# Patient Record
Sex: FEMALE | Race: WHITE | NOT HISPANIC OR LATINO | Employment: STUDENT | URBAN - METROPOLITAN AREA
[De-identification: names, ages, dates, MRNs, and addresses within clinical notes are randomized per-mention and may not be internally consistent; named-entity substitution may affect disease eponyms.]

---
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
## 2018-01-08 ENCOUNTER — PATIENT MESSAGE (OUTPATIENT)
Dept: OPHTHALMOLOGY | Facility: CLINIC | Age: 18
End: 2018-01-08

## 2018-01-10 DIAGNOSIS — G40.209 LOCALIZATION-RELATED FOCAL EPILEPSY WITH COMPLEX PARTIAL SEIZURES: ICD-10-CM

## 2018-01-10 RX ORDER — LEVETIRACETAM 750 MG/1
TABLET ORAL
Qty: 60 TABLET | Refills: 0 | Status: SHIPPED | OUTPATIENT
Start: 2018-01-10 | End: 2018-01-12 | Stop reason: SDUPTHER

## 2018-01-12 ENCOUNTER — OFFICE VISIT (OUTPATIENT)
Dept: PEDIATRIC NEUROLOGY | Facility: CLINIC | Age: 18
End: 2018-01-12
Payer: MEDICAID

## 2018-01-12 VITALS
WEIGHT: 131.38 LBS | DIASTOLIC BLOOD PRESSURE: 59 MMHG | HEART RATE: 105 BPM | HEIGHT: 57 IN | BODY MASS INDEX: 28.34 KG/M2 | SYSTOLIC BLOOD PRESSURE: 110 MMHG

## 2018-01-12 DIAGNOSIS — G40.209 LOCALIZATION-RELATED FOCAL EPILEPSY WITH COMPLEX PARTIAL SEIZURES: Primary | ICD-10-CM

## 2018-01-12 PROCEDURE — 99999 PR PBB SHADOW E&M-EST. PATIENT-LVL III: CPT | Mod: PBBFAC,,, | Performed by: PSYCHIATRY & NEUROLOGY

## 2018-01-12 PROCEDURE — 99213 OFFICE O/P EST LOW 20 MIN: CPT | Mod: S$PBB,,, | Performed by: PSYCHIATRY & NEUROLOGY

## 2018-01-12 PROCEDURE — 99213 OFFICE O/P EST LOW 20 MIN: CPT | Mod: PBBFAC | Performed by: PSYCHIATRY & NEUROLOGY

## 2018-01-12 RX ORDER — LEVETIRACETAM 750 MG/1
750 TABLET ORAL 2 TIMES DAILY
Qty: 60 TABLET | Refills: 6 | Status: SHIPPED | OUTPATIENT
Start: 2018-01-12 | End: 2018-08-16 | Stop reason: SDUPTHER

## 2018-01-12 NOTE — PROGRESS NOTES
Subjective:      Patient ID: Yudy Jauregui is a 17 y.o. female.    HPI   17 yo female with epilepsy and ID. I last saw her 6/29/17. Mom is present and provided much of the history.  Was seizure free for 2 years. Recently, ran out of medication and had breakthrough seizure.  She is on keppra 750 mg BID. No side effects.    Current Outpatient Prescriptions   Medication Sig    amitriptyline (ELAVIL) 75 MG tablet TK 1 T PO QHS    clonidine (CATAPRES) 0.1 MG tablet Take 0.1 mg by mouth 2 (two) times daily.    diazepam 5-7.5-10 mg (DIASTAT ACUDIAL) 5-7.5-10 mg Kit Place 1 each (10 mg total) rectally as needed (seizure > 5 mins).    levETIRAcetam (KEPPRA) 750 MG Tab TAKE 1 TABLET BY MOUTH TWICE DAILY    levonorgestrel-ethinyl estradiol (SEASONALE) 0.15 mg-30 mcg per tablet Take 1 tablet by mouth once daily.    VYVANSE 50 mg capsule Take 50 mg by mouth once daily.     No current facility-administered medications for this visit.          The following portions of the patient's history were reviewed and updated as appropriate: allergies, current medications, past family history, past medical history, past social history, past surgical history and problem list.    Review of Systems   Constitutional: Negative for activity change, appetite change, chills, fatigue and unexpected weight change.   HENT: Negative.    Eyes:        Sees dr Harding   Respiratory: Negative.    Cardiovascular: Negative.    Gastrointestinal: Negative.    Endocrine: Negative.    Allergic/Immunologic: Negative.    Neurological: Positive for seizures. Negative for facial asymmetry and headaches.       Objective:   Neurologic Exam     Cranial Nerves     CN III, IV, VI   Pupils are equal, round, and reactive to light.  Extraocular motions are normal.     Motor Exam     Strength   Strength 5/5 throughout.     Gait, Coordination, and Reflexes     Reflexes   Right biceps: 1+  Left biceps: 1+  Right patellar: 1+  Left patellar: 1+  Right achilles: 1+  Left  achilles: 1+      Physical Exam   Constitutional: She appears well-developed and well-nourished. No distress.   Eyes: EOM are normal. Pupils are equal, round, and reactive to light.   Cardiovascular: Normal rate.    Pulmonary/Chest: Effort normal.   Neurological: She is alert. She has normal strength. No cranial nerve deficit or sensory deficit. She exhibits abnormal muscle tone. She displays a negative Romberg sign. Coordination abnormal. Gait normal.   Reflex Scores:       Bicep reflexes are 1+ on the right side and 1+ on the left side.       Patellar reflexes are 1+ on the right side and 1+ on the left side.       Achilles reflexes are 1+ on the right side and 1+ on the left side.      Assessment:     15 yo female with epilepsy and ID    Plan:     Continue keppra. SEs reviewed.  Seizure precautions and seizure first aid were discussed with the patient and family.  Discussed plan for transition of care  Follow up in 6 months  Family was instructed to contact either the primary care physician office or our office by telephone if there is any deterioration in his neurologic status, change in presenting symptoms, lack of beneficial response to treatment plan, or signs of adverse effects of current therapies, all of which were reviewed.   Letter sent to PCP

## 2018-02-07 DIAGNOSIS — G40.209 LOCALIZATION-RELATED FOCAL EPILEPSY WITH COMPLEX PARTIAL SEIZURES: ICD-10-CM

## 2018-02-07 RX ORDER — LEVETIRACETAM 750 MG/1
TABLET ORAL
Qty: 60 TABLET | Refills: 0 | OUTPATIENT
Start: 2018-02-07

## 2018-02-08 ENCOUNTER — OFFICE VISIT (OUTPATIENT)
Dept: OPHTHALMOLOGY | Facility: CLINIC | Age: 18
End: 2018-02-08
Payer: MEDICAID

## 2018-02-08 DIAGNOSIS — H52.13 SEVERE MYOPIA OF BOTH EYES: ICD-10-CM

## 2018-02-08 DIAGNOSIS — H50.52 EXOPHORIA: Primary | ICD-10-CM

## 2018-02-08 PROCEDURE — 92012 INTRM OPH EXAM EST PATIENT: CPT | Mod: ,,, | Performed by: OPHTHALMOLOGY

## 2018-02-08 PROCEDURE — 92060 SENSORIMOTOR EXAMINATION: CPT | Mod: ,,, | Performed by: OPHTHALMOLOGY

## 2018-02-08 NOTE — PROGRESS NOTES
HPI     DLS 04/06/17   16 Y/O F here today with her Care-Giver (Dayana) for her   annual exotropia ck. Pt reports vision is good and has been doing well at   school. stj    POHx:   1. Exotropia     S/P Procedure: recession of RLR of 6mm Myectomy of IO of OU  (05/18/2005)    Last edited by Kimberly Ramirez MA on 2/8/2018  9:14 AM. (History)            Assessment /Plan     For exam results, see Encounter Report.    Exophoria    Severe myopia of both eyes      Update specs  RTC PRN, optom

## 2018-02-12 ENCOUNTER — PATIENT MESSAGE (OUTPATIENT)
Dept: OPHTHALMOLOGY | Facility: CLINIC | Age: 18
End: 2018-02-12

## 2018-04-05 ENCOUNTER — OFFICE VISIT (OUTPATIENT)
Dept: OBSTETRICS AND GYNECOLOGY | Facility: CLINIC | Age: 18
End: 2018-04-05
Payer: MEDICAID

## 2018-04-05 VITALS
DIASTOLIC BLOOD PRESSURE: 80 MMHG | WEIGHT: 132.19 LBS | HEIGHT: 57 IN | SYSTOLIC BLOOD PRESSURE: 110 MMHG | BODY MASS INDEX: 28.52 KG/M2

## 2018-04-05 DIAGNOSIS — Z30.8 ENCOUNTER FOR OTHER CONTRACEPTIVE MANAGEMENT: ICD-10-CM

## 2018-04-05 DIAGNOSIS — Z30.9 ENCOUNTER FOR CONTRACEPTIVE MANAGEMENT, UNSPECIFIED TYPE: ICD-10-CM

## 2018-04-05 DIAGNOSIS — Z01.419 ENCOUNTER FOR GYNECOLOGICAL EXAMINATION WITHOUT ABNORMAL FINDING: Primary | ICD-10-CM

## 2018-04-05 PROCEDURE — 99395 PREV VISIT EST AGE 18-39: CPT | Mod: S$PBB,,, | Performed by: OBSTETRICS & GYNECOLOGY

## 2018-04-05 PROCEDURE — 99213 OFFICE O/P EST LOW 20 MIN: CPT | Mod: PBBFAC,PN | Performed by: OBSTETRICS & GYNECOLOGY

## 2018-04-05 PROCEDURE — 99999 PR PBB SHADOW E&M-EST. PATIENT-LVL III: CPT | Mod: PBBFAC,,, | Performed by: OBSTETRICS & GYNECOLOGY

## 2018-04-05 RX ORDER — LEVONORGESTREL AND ETHINYL ESTRADIOL 0.15-0.03
1 KIT ORAL DAILY
Qty: 84 TABLET | Refills: 4 | Status: SHIPPED | OUTPATIENT
Start: 2018-04-05 | End: 2019-04-05

## 2018-05-10 NOTE — PROGRESS NOTES
"CC: Well woman exam    Yudy Jauregui is a 17 y.o. female  presents for [employer]. Patient is doing well on current OCPs    Past Medical History:   Diagnosis Date    Intellectual disability     Seizures     Strabismus        Past Surgical History:   Procedure Laterality Date    STRABISMUS SURGERY      BMR recession OU .5MM    STRABISMUS SURGERY  2001    BSO tenotomy/LLR recession 4mm    STRABISMUS SURGERY  2005    RLR recession 6mm/ IO myectomy OU        OB History    Para Term  AB Living   0 0 0 0 0 0   SAB TAB Ectopic Multiple Live Births   0 0 0 0               Family History   Problem Relation Age of Onset    Ovarian cancer Maternal Aunt         dx in age early 30's    Hypertension Father     Strabismus Father     Amblyopia Paternal Aunt     Strabismus Paternal Aunt     Cataracts Maternal Grandmother     Hypertension Maternal Grandmother     Cancer Maternal Grandfather     Hypertension Paternal Grandmother     Thyroid disease Paternal Grandfather     Blindness Neg Hx     Diabetes Neg Hx     Macular degeneration Neg Hx     Retinal detachment Neg Hx     Stroke Neg Hx     Obesity Neg Hx     Breast cancer Neg Hx     Colon cancer Neg Hx        Social History   Substance Use Topics    Smoking status: Never Smoker    Smokeless tobacco: Never Used    Alcohol use No       /80 (BP Location: Right arm, Patient Position: Sitting, BP Method: Medium (Manual))   Ht 4' 9" (1.448 m)   Wt 59.9 kg (132 lb 2.7 oz)   LMP 2018 (Approximate)   BMI 28.60 kg/m²     ROS:  GENERAL: Denies weight gain or weight loss. Feeling well overall.   SKIN: Denies rash or lesions.   HEAD: Denies head injury or headache.   NODES: Denies enlarged lymph nodes.   CHEST: Denies chest pain or shortness of breath.   CARDIOVASCULAR: Denies palpitations or left sided chest pain.   ABDOMEN: No abdominal pain, constipation, diarrhea, nausea, vomiting or rectal bleeding.   URINARY: No frequency, " dysuria, hematuria, or burning on urination.  REPRODUCTIVE: See HPI.   BREASTS: The patient performs breast self-examination and denies pain, lumps, or nipple discharge.   HEMATOLOGIC: No easy bruisability or excessive bleeding.  MUSCULOSKELETAL: Denies joint pain or swelling.   NEUROLOGIC: Denies syncope or weakness.   PSYCHIATRIC: Denies depression, anxiety or mood swings.    Physical Exam:    APPEARANCE: Well nourished, well developed, in no acute distress.  AFFECT: WNL, alert and oriented x 3  SKIN: No acne or hirsutism  NECK: Neck symmetric without masses or thyromegaly  NODES: No inguinal, cervical, axillary, or femoral lymph node enlargement  CHEST: Good respiratory effect  ABDOMEN: Soft.  No tenderness or masses.  No hepatosplenomegaly.  No hernias.  BREASTS: Symmetrical, no skin changes or visible lesions.  No palpable masses, nipple discharge bilaterally.  PELVIC: Normal external genitalia without lesions.  Normal hair distribution.  Adequate perineal body, normal urethral meatus.  Vagina moist and well rugated without lesions or discharge.  Cervix pink, without lesions, discharge or tenderness.  No significant cystocele or rectocele.  Bimanual exam shows uterus to be normal size, regular, mobile and nontender.  Adnexa without masses or tenderness.    EXTREMITIES: No edema.    ASSESSMENT AND PLAN  1. Encounter for gynecological examination without abnormal finding     2. Encounter for contraceptive management, unspecified type     3. Encounter for other contraceptive management  levonorgestrel-ethinyl estradiol (SEASONALE) 0.15 mg-30 mcg per tablet       Patient was counseled today on A.C.S. Pap guidelines and recommendations for yearly pelvic exams, mammograms and monthly self breast exams; to see her PCP for other health maintenance.     No Follow-up on file.

## 2018-06-04 DIAGNOSIS — Z30.8 ENCOUNTER FOR OTHER CONTRACEPTIVE MANAGEMENT: ICD-10-CM

## 2018-07-18 ENCOUNTER — PATIENT MESSAGE (OUTPATIENT)
Dept: PEDIATRIC NEUROLOGY | Facility: CLINIC | Age: 18
End: 2018-07-18

## 2018-08-16 ENCOUNTER — OFFICE VISIT (OUTPATIENT)
Dept: PEDIATRIC NEUROLOGY | Facility: CLINIC | Age: 18
End: 2018-08-16
Payer: MEDICAID

## 2018-08-16 VITALS
SYSTOLIC BLOOD PRESSURE: 118 MMHG | HEART RATE: 113 BPM | DIASTOLIC BLOOD PRESSURE: 72 MMHG | BODY MASS INDEX: 28.51 KG/M2 | WEIGHT: 131.75 LBS

## 2018-08-16 DIAGNOSIS — G40.209 LOCALIZATION-RELATED FOCAL EPILEPSY WITH COMPLEX PARTIAL SEIZURES: Primary | ICD-10-CM

## 2018-08-16 PROCEDURE — 99213 OFFICE O/P EST LOW 20 MIN: CPT | Mod: S$GLB,,, | Performed by: PSYCHIATRY & NEUROLOGY

## 2018-08-16 RX ORDER — LEVETIRACETAM 750 MG/1
750 TABLET ORAL 2 TIMES DAILY
Qty: 60 TABLET | Refills: 6 | Status: SHIPPED | OUTPATIENT
Start: 2018-08-16 | End: 2019-03-25 | Stop reason: SDUPTHER

## 2018-08-16 NOTE — PROGRESS NOTES
Subjective:      Patient ID: Yudy Jauregui is a 17 y.o. female.    HPI   18 yo female with epilepsy and ID. I last saw her 7/18/18. Mom is present and provided much of the history.  Was seizure free for 2 years. In early 2018, ran out of medication and had breakthrough seizure.  She is on keppra 750 mg BID. No side effects.    Current Outpatient Medications   Medication Sig    amitriptyline (ELAVIL) 75 MG tablet TK 1 T PO QHS    clonidine (CATAPRES) 0.1 MG tablet Take 0.1 mg by mouth 2 (two) times daily.    diazepam 5-7.5-10 mg (DIASTAT ACUDIAL) 5-7.5-10 mg Kit Place 1 each (10 mg total) rectally as needed (seizure > 5 mins).    levETIRAcetam (KEPPRA) 750 MG Tab Take 1 tablet (750 mg total) by mouth 2 (two) times daily.    levonorgestrel-ethinyl estradiol (SEASONALE) 0.15 mg-30 mcg per tablet Take 1 tablet by mouth once daily.    VYVANSE 50 mg capsule Take 50 mg by mouth once daily.     No current facility-administered medications for this visit.          The following portions of the patient's history were reviewed and updated as appropriate: allergies, current medications, past family history, past medical history, past social history, past surgical history and problem list.    Review of Systems   Constitutional: Negative for activity change, appetite change, chills, fatigue and unexpected weight change.   HENT: Negative.    Eyes:        Sees dr Harding   Respiratory: Negative.    Cardiovascular: Negative.    Gastrointestinal: Negative.    Endocrine: Negative.    Allergic/Immunologic: Negative.    Neurological: Positive for seizures. Negative for facial asymmetry and headaches.   All other systems reviewed and are negative.      Objective:   Neurologic Exam     Cranial Nerves     CN III, IV, VI   Pupils are equal, round, and reactive to light.  Extraocular motions are normal.     Motor Exam     Strength   Strength 5/5 throughout.     Gait, Coordination, and Reflexes     Reflexes   Right biceps: 1+  Left biceps:  1+  Right patellar: 1+  Left patellar: 1+  Right achilles: 1+  Left achilles: 1+      Physical Exam   Constitutional: She appears well-developed and well-nourished. No distress.   Eyes: EOM are normal. Pupils are equal, round, and reactive to light.   Cardiovascular: Normal rate.   Pulmonary/Chest: Effort normal.   Abdominal: Soft. Bowel sounds are normal.   Musculoskeletal: Normal range of motion.   Neurological: She is alert. She has normal strength. No cranial nerve deficit or sensory deficit. She exhibits abnormal muscle tone. She displays a negative Romberg sign. Coordination abnormal. Gait normal.   Reflex Scores:       Bicep reflexes are 1+ on the right side and 1+ on the left side.       Patellar reflexes are 1+ on the right side and 1+ on the left side.       Achilles reflexes are 1+ on the right side and 1+ on the left side.  Skin: Skin is warm. Capillary refill takes less than 2 seconds.       Assessment:     16 yo female with epilepsy and ID    Plan:     Continue keppra. SEs reviewed.  Seizure precautions and seizure first aid were discussed with the patient and family.  Discussed plan for transition of care  Follow up in 6 months  Family was instructed to contact either the primary care physician office or our office by telephone if there is any deterioration in his neurologic status, change in presenting symptoms, lack of beneficial response to treatment plan, or signs of adverse effects of current therapies, all of which were reviewed.   Letter sent to PCP

## 2018-08-16 NOTE — LETTER
August 16, 2018      [facility]  [address]  [place] [address]  Phone: [phone]  Fax: [phone]       Patient: Yudy Jauregui   YOB: 2000  Date of Visit: 08/16/2018    To Whom It May Concern:    Yuridia Jauregui  was at [facility] on 08/16/2018. She may return to work/school on 8/17/2018 with no restrictions. If you have any questions or concerns, or if I can be of further assistance, please do not hesitate to contact me.    Sincerely,      Lety Madrigal LPN

## 2018-08-24 ENCOUNTER — TELEPHONE (OUTPATIENT)
Dept: OPHTHALMOLOGY | Facility: CLINIC | Age: 18
End: 2018-08-24

## 2018-08-24 NOTE — TELEPHONE ENCOUNTER
----- Message from Alecia Flores sent at 8/24/2018  2:36 PM CDT -----  Contact: WESTON Jauregui   Pt mother Ms Jauregui would like to speak with  nurse please about the eye glass prescription ,she can be reached at [phone] please thank you.

## 2018-08-24 NOTE — TELEPHONE ENCOUNTER
----- Message from Serena Chatman sent at 8/24/2018  2:48 PM CDT -----  Contact: Vikki Jauregui [phone]  Patient Returning Call from [facility]    Who Left Message for Patient: Keira    Communication Preference: [phone]    Additional Information: pt mother returning your phone call.

## 2018-09-04 DIAGNOSIS — Z30.8 ENCOUNTER FOR OTHER CONTRACEPTIVE MANAGEMENT: ICD-10-CM

## 2018-09-17 DIAGNOSIS — G40.209 LOCALIZATION-RELATED FOCAL EPILEPSY WITH COMPLEX PARTIAL SEIZURES: ICD-10-CM

## 2018-09-17 RX ORDER — LEVETIRACETAM 750 MG/1
TABLET ORAL
Qty: 60 TABLET | Refills: 0 | OUTPATIENT
Start: 2018-09-17

## 2019-01-01 ENCOUNTER — OFFICE VISIT (OUTPATIENT)
Dept: PEDIATRIC NEUROLOGY | Facility: CLINIC | Age: 19
End: 2019-01-01
Payer: MEDICAID

## 2019-01-01 VITALS
BODY MASS INDEX: 28.1 KG/M2 | HEART RATE: 116 BPM | SYSTOLIC BLOOD PRESSURE: 119 MMHG | WEIGHT: 139.13 LBS | DIASTOLIC BLOOD PRESSURE: 69 MMHG

## 2019-01-01 DIAGNOSIS — G40.209 LOCALIZATION-RELATED FOCAL EPILEPSY WITH COMPLEX PARTIAL SEIZURES: Primary | ICD-10-CM

## 2019-01-01 DIAGNOSIS — Z30.8 ENCOUNTER FOR OTHER CONTRACEPTIVE MANAGEMENT: ICD-10-CM

## 2019-01-01 PROCEDURE — 99213 OFFICE O/P EST LOW 20 MIN: CPT | Mod: S$GLB,,, | Performed by: PSYCHIATRY & NEUROLOGY

## 2019-01-01 PROCEDURE — 99213 PR OFFICE/OUTPT VISIT, EST, LEVL III, 20-29 MIN: ICD-10-PCS | Mod: S$GLB,,, | Performed by: PSYCHIATRY & NEUROLOGY

## 2019-01-01 RX ORDER — LEVETIRACETAM 750 MG/1
750 TABLET ORAL 2 TIMES DAILY
Qty: 60 TABLET | Refills: 11 | Status: ON HOLD | OUTPATIENT
Start: 2019-01-01 | End: 2020-01-01 | Stop reason: HOSPADM

## 2019-03-23 ENCOUNTER — PATIENT MESSAGE (OUTPATIENT)
Dept: PEDIATRIC NEUROLOGY | Facility: CLINIC | Age: 19
End: 2019-03-23

## 2019-03-23 DIAGNOSIS — G40.209 LOCALIZATION-RELATED FOCAL EPILEPSY WITH COMPLEX PARTIAL SEIZURES: ICD-10-CM

## 2019-03-25 RX ORDER — DIAZEPAM 10 MG/2G
10 GEL RECTAL
Qty: 1 KIT | Refills: 2 | Status: ON HOLD | OUTPATIENT
Start: 2019-03-25 | End: 2020-01-01 | Stop reason: HOSPADM

## 2019-03-25 RX ORDER — LEVETIRACETAM 750 MG/1
750 TABLET ORAL 2 TIMES DAILY
Qty: 60 TABLET | Refills: 6 | Status: SHIPPED | OUTPATIENT
Start: 2019-03-25 | End: 2019-01-01 | Stop reason: SDUPTHER

## 2019-06-05 ENCOUNTER — PATIENT MESSAGE (OUTPATIENT)
Dept: PEDIATRIC NEUROLOGY | Facility: CLINIC | Age: 19
End: 2019-06-05

## 2019-06-25 ENCOUNTER — OFFICE VISIT (OUTPATIENT)
Dept: OBSTETRICS AND GYNECOLOGY | Facility: CLINIC | Age: 19
End: 2019-06-25
Payer: MEDICAID

## 2019-06-25 VITALS
WEIGHT: 138.25 LBS | BODY MASS INDEX: 27.87 KG/M2 | HEIGHT: 59 IN | SYSTOLIC BLOOD PRESSURE: 119 MMHG | DIASTOLIC BLOOD PRESSURE: 72 MMHG

## 2019-06-25 DIAGNOSIS — Z30.9 ENCOUNTER FOR CONTRACEPTIVE MANAGEMENT, UNSPECIFIED TYPE: ICD-10-CM

## 2019-06-25 DIAGNOSIS — Z01.419 ENCOUNTER FOR GYNECOLOGICAL EXAMINATION WITHOUT ABNORMAL FINDING: Primary | ICD-10-CM

## 2019-06-25 DIAGNOSIS — Z30.8 ENCOUNTER FOR OTHER CONTRACEPTIVE MANAGEMENT: ICD-10-CM

## 2019-06-25 PROCEDURE — 99999 PR PBB SHADOW E&M-EST. PATIENT-LVL III: ICD-10-PCS | Mod: PBBFAC,,, | Performed by: OBSTETRICS & GYNECOLOGY

## 2019-06-25 PROCEDURE — 99213 OFFICE O/P EST LOW 20 MIN: CPT | Mod: PBBFAC,PN | Performed by: OBSTETRICS & GYNECOLOGY

## 2019-06-25 PROCEDURE — 99999 PR PBB SHADOW E&M-EST. PATIENT-LVL III: CPT | Mod: PBBFAC,,, | Performed by: OBSTETRICS & GYNECOLOGY

## 2019-06-25 PROCEDURE — 99395 PR PREVENTIVE VISIT,EST,18-39: ICD-10-PCS | Mod: S$PBB,,, | Performed by: OBSTETRICS & GYNECOLOGY

## 2019-06-25 PROCEDURE — 99395 PREV VISIT EST AGE 18-39: CPT | Mod: S$PBB,,, | Performed by: OBSTETRICS & GYNECOLOGY

## 2019-06-25 RX ORDER — ACYCLOVIR 400 MG/1
TABLET ORAL
Refills: 0 | COMMUNITY
Start: 2019-06-21 | End: 2019-01-01 | Stop reason: ALTCHOICE

## 2019-06-25 RX ORDER — LEVONORGESTREL AND ETHINYL ESTRADIOL 0.15-0.03
1 KIT ORAL DAILY
Qty: 91 TABLET | Refills: 3 | Status: ON HOLD | OUTPATIENT
Start: 2019-06-25 | End: 2020-01-01 | Stop reason: HOSPADM

## 2019-06-25 NOTE — PROGRESS NOTES
"CC: Well woman exam    Yudy Jauregui is a 18 y.o. female  presents for a well woman exam.  She has no issues, problems, or complaints.  Doing well on OCPs with minimal cycle. Mom with patient and says the better is better on OCPs and would like refill.  Mom reports patient is not sexually active and with her most of the time    Past Medical History:   Diagnosis Date    Intellectual disability     Seizures     Strabismus        Past Surgical History:   Procedure Laterality Date    STRABISMUS SURGERY      BMR recession OU .5MM    STRABISMUS SURGERY  2001    BSO tenotomy/LLR recession 4mm    STRABISMUS SURGERY  2005    RLR recession 6mm/ IO myectomy OU        OB History    Para Term  AB Living   0 0 0 0 0 0   SAB TAB Ectopic Multiple Live Births   0 0 0 0         Family History   Problem Relation Age of Onset    Ovarian cancer Maternal Aunt         dx in age early 30's    Hypertension Father     Strabismus Father     Amblyopia Paternal Aunt     Strabismus Paternal Aunt     Cataracts Maternal Grandmother     Hypertension Maternal Grandmother     Cancer Maternal Grandfather     Hypertension Paternal Grandmother     Thyroid disease Paternal Grandfather     Blindness Neg Hx     Diabetes Neg Hx     Macular degeneration Neg Hx     Retinal detachment Neg Hx     Stroke Neg Hx     Obesity Neg Hx     Breast cancer Neg Hx     Colon cancer Neg Hx        Social History     Tobacco Use    Smoking status: Never Smoker    Smokeless tobacco: Never Used   Substance Use Topics    Alcohol use: No    Drug use: No       /72   Ht 4' 11" (1.499 m)   Wt 62.7 kg (138 lb 3.7 oz)   LMP 2019   BMI 27.92 kg/m²     ROS:  GENERAL: Denies weight gain or weight loss. Feeling well overall.   SKIN: Denies rash or lesions.   HEAD: Denies head injury or headache.   NODES: Denies enlarged lymph nodes.   CHEST: Denies chest pain or shortness of breath.   CARDIOVASCULAR: Denies palpitations " or left sided chest pain.   ABDOMEN: No abdominal pain, constipation, diarrhea, nausea, vomiting or rectal bleeding.   URINARY: No frequency, dysuria, hematuria, or burning on urination.  REPRODUCTIVE: See HPI.   BREASTS: The patient performs breast self-examination and denies pain, lumps, or nipple discharge.   HEMATOLOGIC: No easy bruisability or excessive bleeding.  MUSCULOSKELETAL: Denies joint pain or swelling.   NEUROLOGIC: Denies syncope or weakness.   PSYCHIATRIC: Denies depression, anxiety or mood swings.    Physical Exam:    APPEARANCE: Well nourished, well developed, in no acute distress.  AFFECT: WNL, alert and oriented x 3  SKIN: No acne or hirsutism  NECK: Neck symmetric without masses or thyromegaly  NODES: No inguinal, cervical, axillary, or femoral lymph node enlargement  CHEST: Good respiratory effect  ABDOMEN: Soft.  No tenderness or masses.  No hepatosplenomegaly.  No hernias.  BREASTS: Symmetrical, no skin changes or visible lesions.  No palpable masses, nipple discharge bilaterally.  PELVIC: Normal external genitalia without lesions.  Normal hair distribution.  Adequate perineal body, normal urethral meatus.  Vagina moist and well rugated without lesions or discharge.  Cervix pink, without lesions, discharge or tenderness.  No significant cystocele or rectocele.  Bimanual exam shows uterus to be normal size, regular, mobile and nontender.  Adnexa without masses or tenderness.    EXTREMITIES: No edema.    ASSESSMENT AND PLAN  1. Encounter for gynecological examination without abnormal finding     2. Encounter for contraceptive management, unspecified type     3. Encounter for other contraceptive management  levonorgestrel-ethinyl estradiol (INTROVALE) 0.15 mg-30 mcg (91) per tablet       Patient was counseled today on A.C.S. Pap guidelines and recommendations for yearly pelvic exams, mammograms and monthly self breast exams; to see her PCP for other health maintenance.     Follow up in about 1  year (around 6/25/2020).  F/U in one year for annual / pap

## 2019-08-15 NOTE — PROGRESS NOTES
Subjective:      Patient ID: Yudy Jauregui is a 18 y.o. female.    HPI   19 yo female with epilepsy and ID. I last saw her 8/16/18. Mom is present and provided much of the history.  Was seizure free for 2 years. In early 2018, ran out of medication and had breakthrough seizure.  She is on keppra 750 mg BID. No side effects.    Current Outpatient Medications   Medication Sig    amitriptyline (ELAVIL) 75 MG tablet TK 1 T PO QHS    clonidine (CATAPRES) 0.1 MG tablet Take 0.1 mg by mouth 2 (two) times daily.    diazePAM 5-7.5-10 mg (DIASTAT ACUDIAL) 5-7.5-10 mg Kit Place 1 each (10 mg total) rectally as needed (seizure > 5 mins).    levETIRAcetam (KEPPRA) 750 MG Tab Take 1 tablet (750 mg total) by mouth 2 (two) times daily.    levonorgestrel-ethinyl estradiol (INTROVALE) 0.15 mg-30 mcg (91) per tablet Take 1 tablet by mouth once daily.    VYVANSE 50 mg capsule Take 50 mg by mouth once daily.     No current facility-administered medications for this visit.          The following portions of the patient's history were reviewed and updated as appropriate: allergies, current medications, past family history, past medical history, past social history, past surgical history and problem list.    Review of Systems   Constitutional: Negative for activity change, appetite change, chills, fatigue and unexpected weight change.   HENT: Negative.    Eyes:        Sees dr Harding   Respiratory: Negative.    Cardiovascular: Negative.    Gastrointestinal: Negative.    Endocrine: Negative.    Allergic/Immunologic: Negative.    Neurological: Positive for seizures. Negative for facial asymmetry and headaches.   All other systems reviewed and are negative.      Objective:   Neurologic Exam     Cranial Nerves     CN III, IV, VI   Pupils are equal, round, and reactive to light.  Extraocular motions are normal.     Motor Exam     Strength   Strength 5/5 throughout.     Gait, Coordination, and Reflexes     Reflexes   Right biceps: 1+  Left  biceps: 1+  Right patellar: 1+  Left patellar: 1+  Right achilles: 1+  Left achilles: 1+      Physical Exam   Constitutional: She appears well-developed and well-nourished. No distress.   Eyes: Pupils are equal, round, and reactive to light. EOM are normal.   Cardiovascular: Normal rate.   Pulmonary/Chest: Effort normal.   Abdominal: Soft. Bowel sounds are normal.   Musculoskeletal: Normal range of motion.   Neurological: She is alert. She has normal strength. No cranial nerve deficit or sensory deficit. She exhibits abnormal muscle tone. She displays a negative Romberg sign. Coordination abnormal. Gait normal.   Reflex Scores:       Bicep reflexes are 1+ on the right side and 1+ on the left side.       Patellar reflexes are 1+ on the right side and 1+ on the left side.       Achilles reflexes are 1+ on the right side and 1+ on the left side.  Skin: Skin is warm. Capillary refill takes less than 2 seconds.       Assessment:     19 yo female with epilepsy and ID    Plan:     Continue keppra. SEs reviewed.  Seizure precautions and seizure first aid were discussed with the patient and family.  Discussed plan for transition of care. Will continue to follow with her for now  Family was instructed to contact either the primary care physician office or our office by telephone if there is any deterioration in his neurologic status, change in presenting symptoms, lack of beneficial response to treatment plan, or signs of adverse effects of current therapies, all of which were reviewed.   Letter sent to PCP

## 2019-08-15 NOTE — LETTER
August 15, 2019      [facility]  [address]  [place] [address]  Phone: [phone]  Fax: [phone]       Patient: Yudy Jauregui   YOB: 2000  Date of Visit: 08/15/2019    To Whom It May Concern:    Yuridia Jauregui  was at [facility] on 08/15/2019. She may return to work/school on 8/15/2019 with no restrictions. If you have any questions or concerns, or if I can be of further assistance, please do not hesitate to contact me.    Sincerely,      Lety Madrigal LPN

## 2020-01-01 ENCOUNTER — PATIENT MESSAGE (OUTPATIENT)
Dept: OPHTHALMOLOGY | Facility: CLINIC | Age: 20
End: 2020-01-01

## 2020-01-01 ENCOUNTER — ANESTHESIA (OUTPATIENT)
Dept: INTENSIVE CARE | Facility: HOSPITAL | Age: 20
End: 2020-01-01

## 2020-01-01 ENCOUNTER — TELEPHONE (OUTPATIENT)
Dept: PEDIATRIC NEUROLOGY | Facility: CLINIC | Age: 20
End: 2020-01-01

## 2020-01-01 ENCOUNTER — HOSPITAL ENCOUNTER (INPATIENT)
Facility: HOSPITAL | Age: 20
LOS: 4 days | DRG: 270 | End: 2020-06-27
Attending: INTERNAL MEDICINE | Admitting: INTERNAL MEDICINE
Payer: MEDICAID

## 2020-01-01 ENCOUNTER — TELEPHONE (OUTPATIENT)
Dept: TRANSPLANT | Facility: CLINIC | Age: 20
End: 2020-01-01

## 2020-01-01 ENCOUNTER — ANESTHESIA EVENT (OUTPATIENT)
Dept: INTENSIVE CARE | Facility: HOSPITAL | Age: 20
End: 2020-01-01

## 2020-01-01 ENCOUNTER — ANESTHESIA EVENT (OUTPATIENT)
Dept: INTENSIVE CARE | Facility: HOSPITAL | Age: 20
DRG: 270 | End: 2020-01-01
Payer: MEDICAID

## 2020-01-01 ENCOUNTER — PATIENT MESSAGE (OUTPATIENT)
Dept: PEDIATRIC NEUROLOGY | Facility: CLINIC | Age: 20
End: 2020-01-01

## 2020-01-01 ENCOUNTER — ANESTHESIA (OUTPATIENT)
Dept: INTENSIVE CARE | Facility: HOSPITAL | Age: 20
DRG: 270 | End: 2020-01-01
Payer: MEDICAID

## 2020-01-01 VITALS
DIASTOLIC BLOOD PRESSURE: 63 MMHG | SYSTOLIC BLOOD PRESSURE: 94 MMHG | RESPIRATION RATE: 16 BRPM | TEMPERATURE: 102 F | BODY MASS INDEX: 32 KG/M2 | OXYGEN SATURATION: 45 % | WEIGHT: 158.75 LBS | HEIGHT: 59 IN

## 2020-01-01 DIAGNOSIS — J96.00 ACUTE RESPIRATORY FAILURE: ICD-10-CM

## 2020-01-01 DIAGNOSIS — I50.9 DECOMPENSATED HEART FAILURE: ICD-10-CM

## 2020-01-01 DIAGNOSIS — F90.9 ATTENTION DEFICIT HYPERACTIVITY DISORDER (ADHD), UNSPECIFIED ADHD TYPE: ICD-10-CM

## 2020-01-01 DIAGNOSIS — G40.909 SEIZURE DISORDER: ICD-10-CM

## 2020-01-01 DIAGNOSIS — R57.0 CARDIOGENIC SHOCK: ICD-10-CM

## 2020-01-01 DIAGNOSIS — D68.9 COAGULATION DEFECT, UNSPECIFIED: ICD-10-CM

## 2020-01-01 DIAGNOSIS — R00.0 SINUS TACHYCARDIA: ICD-10-CM

## 2020-01-01 DIAGNOSIS — N17.9 AKI (ACUTE KIDNEY INJURY): ICD-10-CM

## 2020-01-01 DIAGNOSIS — J96.00 ACUTE RESPIRATORY FAILURE, UNSPECIFIED WHETHER WITH HYPOXIA OR HYPERCAPNIA: ICD-10-CM

## 2020-01-01 LAB
ABO + RH BLD: NORMAL
ADENOVIRUS: NOT DETECTED
ALBUMIN SERPL BCP-MCNC: 2.7 G/DL (ref 3.5–5.2)
ALBUMIN SERPL BCP-MCNC: 3 G/DL (ref 3.5–5.2)
ALBUMIN SERPL BCP-MCNC: 3.1 G/DL (ref 3.5–5.2)
ALBUMIN SERPL BCP-MCNC: 3.4 G/DL (ref 3.5–5.2)
ALBUMIN SERPL BCP-MCNC: 3.4 G/DL (ref 3.5–5.2)
ALBUMIN SERPL BCP-MCNC: 3.5 G/DL (ref 3.5–5.2)
ALBUMIN SERPL BCP-MCNC: 3.5 G/DL (ref 3.5–5.2)
ALBUMIN SERPL BCP-MCNC: 3.6 G/DL (ref 3.5–5.2)
ALBUMIN SERPL BCP-MCNC: 3.9 G/DL (ref 3.5–5.2)
ALBUMIN SERPL BCP-MCNC: 3.9 G/DL (ref 3.5–5.2)
ALBUMIN SERPL BCP-MCNC: NORMAL G/DL (ref 3.5–5.2)
ALLENS TEST: ABNORMAL
ALP SERPL-CCNC: 34 U/L (ref 55–135)
ALP SERPL-CCNC: 35 U/L (ref 55–135)
ALP SERPL-CCNC: 36 U/L (ref 55–135)
ALP SERPL-CCNC: 37 U/L (ref 55–135)
ALP SERPL-CCNC: 38 U/L (ref 55–135)
ALP SERPL-CCNC: 38 U/L (ref 55–135)
ALP SERPL-CCNC: 40 U/L (ref 55–135)
ALT SERPL W/O P-5'-P-CCNC: 108 U/L (ref 10–44)
ALT SERPL W/O P-5'-P-CCNC: 115 U/L (ref 10–44)
ALT SERPL W/O P-5'-P-CCNC: 120 U/L (ref 10–44)
ALT SERPL W/O P-5'-P-CCNC: 130 U/L (ref 10–44)
ALT SERPL W/O P-5'-P-CCNC: 139 U/L (ref 10–44)
ALT SERPL W/O P-5'-P-CCNC: 139 U/L (ref 10–44)
ALT SERPL W/O P-5'-P-CCNC: 96 U/L (ref 10–44)
AMMONIA PLAS-SCNC: 52 UMOL/L (ref 10–50)
AMMONIA PLAS-SCNC: 62 UMOL/L (ref 10–50)
AMORPH CRY UR QL COMP ASSIST: ABNORMAL
ANION GAP SERPL CALC-SCNC: 13 MMOL/L (ref 8–16)
ANION GAP SERPL CALC-SCNC: 13 MMOL/L (ref 8–16)
ANION GAP SERPL CALC-SCNC: 14 MMOL/L (ref 8–16)
ANION GAP SERPL CALC-SCNC: 15 MMOL/L (ref 8–16)
ANION GAP SERPL CALC-SCNC: 16 MMOL/L (ref 8–16)
ANION GAP SERPL CALC-SCNC: 17 MMOL/L (ref 8–16)
ANION GAP SERPL CALC-SCNC: 18 MMOL/L (ref 8–16)
ANION GAP SERPL CALC-SCNC: 20 MMOL/L (ref 8–16)
ANION GAP SERPL CALC-SCNC: 25 MMOL/L (ref 8–16)
ANION GAP SERPL CALC-SCNC: NORMAL MMOL/L (ref 8–16)
APTT BLDCRRT: 24 SEC (ref 21–32)
APTT BLDCRRT: 25.2 SEC (ref 21–32)
APTT BLDCRRT: 26 SEC (ref 21–32)
APTT BLDCRRT: 29.2 SEC (ref 21–32)
APTT BLDCRRT: 30 SEC (ref 21–32)
AST SERPL-CCNC: 56 U/L (ref 10–40)
AST SERPL-CCNC: 60 U/L (ref 10–40)
AST SERPL-CCNC: 64 U/L (ref 10–40)
AST SERPL-CCNC: 64 U/L (ref 10–40)
AST SERPL-CCNC: 67 U/L (ref 10–40)
AST SERPL-CCNC: 73 U/L (ref 10–40)
AST SERPL-CCNC: 84 U/L (ref 10–40)
AV INDEX (PROSTH): 0.61
AV MEAN GRADIENT: 2 MMHG
AV PEAK GRADIENT: 3 MMHG
AV VALVE AREA: 1.87 CM2
AV VELOCITY RATIO: 0.67
BACTERIA #/AREA URNS AUTO: ABNORMAL /HPF
BACTERIA #/AREA URNS AUTO: ABNORMAL /HPF
BASOPHILS # BLD AUTO: 0 K/UL (ref 0–0.2)
BASOPHILS # BLD AUTO: 0.01 K/UL (ref 0–0.2)
BASOPHILS # BLD AUTO: 0.02 K/UL (ref 0–0.2)
BASOPHILS NFR BLD: 0 % (ref 0–1.9)
BASOPHILS NFR BLD: 0.1 % (ref 0–1.9)
BILIRUB SERPL-MCNC: 2.5 MG/DL (ref 0.1–1)
BILIRUB SERPL-MCNC: 2.6 MG/DL (ref 0.1–1)
BILIRUB SERPL-MCNC: 3.1 MG/DL (ref 0.1–1)
BILIRUB SERPL-MCNC: 3.6 MG/DL (ref 0.1–1)
BILIRUB SERPL-MCNC: 3.8 MG/DL (ref 0.1–1)
BILIRUB SERPL-MCNC: 4.1 MG/DL (ref 0.1–1)
BILIRUB SERPL-MCNC: 4.1 MG/DL (ref 0.1–1)
BILIRUB UR QL STRIP: NEGATIVE
BILIRUB UR QL STRIP: NEGATIVE
BLD GP AB SCN CELLS X3 SERPL QL: NORMAL
BLD PROD TYP BPU: NORMAL
BLOOD UNIT EXPIRATION DATE: NORMAL
BLOOD UNIT TYPE CODE: 6200
BLOOD UNIT TYPE: NORMAL
BNP SERPL-MCNC: >4900 PG/ML (ref 0–99)
BORDETELLA PARAPERTUSSIS (IS1001): NOT DETECTED
BORDETELLA PERTUSSIS (PTXP): NOT DETECTED
BSA FOR ECHO PROCEDURE: 1.71 M2
BUN SERPL-MCNC: 104 MG/DL (ref 6–20)
BUN SERPL-MCNC: 105 MG/DL (ref 6–20)
BUN SERPL-MCNC: 105 MG/DL (ref 6–20)
BUN SERPL-MCNC: 107 MG/DL (ref 6–20)
BUN SERPL-MCNC: 109 MG/DL (ref 6–20)
BUN SERPL-MCNC: 110 MG/DL (ref 6–20)
BUN SERPL-MCNC: 112 MG/DL (ref 6–20)
BUN SERPL-MCNC: 114 MG/DL (ref 6–20)
BUN SERPL-MCNC: 114 MG/DL (ref 6–20)
BUN SERPL-MCNC: 116 MG/DL (ref 6–20)
BUN SERPL-MCNC: 119 MG/DL (ref 6–20)
BUN SERPL-MCNC: 119 MG/DL (ref 6–20)
BUN SERPL-MCNC: 56 MG/DL (ref 6–20)
BUN SERPL-MCNC: 56 MG/DL (ref 6–20)
BUN SERPL-MCNC: 64 MG/DL (ref 6–20)
BUN SERPL-MCNC: 64 MG/DL (ref 6–20)
BUN SERPL-MCNC: 72 MG/DL (ref 6–20)
BUN SERPL-MCNC: 72 MG/DL (ref 6–20)
BUN SERPL-MCNC: 86 MG/DL (ref 6–20)
BUN SERPL-MCNC: 92 MG/DL (ref 6–20)
BUN SERPL-MCNC: 93 MG/DL (ref 6–20)
BUN SERPL-MCNC: NORMAL MG/DL (ref 6–20)
CA-I BLDV-SCNC: 1.36 MMOL/L (ref 1.06–1.42)
CALCIUM SERPL-MCNC: 10.5 MG/DL (ref 8.7–10.5)
CALCIUM SERPL-MCNC: 10.5 MG/DL (ref 8.7–10.5)
CALCIUM SERPL-MCNC: 11.1 MG/DL (ref 8.7–10.5)
CALCIUM SERPL-MCNC: 11.1 MG/DL (ref 8.7–10.5)
CALCIUM SERPL-MCNC: 7.5 MG/DL (ref 8.7–10.5)
CALCIUM SERPL-MCNC: 7.8 MG/DL (ref 8.7–10.5)
CALCIUM SERPL-MCNC: 7.8 MG/DL (ref 8.7–10.5)
CALCIUM SERPL-MCNC: 7.9 MG/DL (ref 8.7–10.5)
CALCIUM SERPL-MCNC: 8 MG/DL (ref 8.7–10.5)
CALCIUM SERPL-MCNC: 8.1 MG/DL (ref 8.7–10.5)
CALCIUM SERPL-MCNC: 8.2 MG/DL (ref 8.7–10.5)
CALCIUM SERPL-MCNC: 8.2 MG/DL (ref 8.7–10.5)
CALCIUM SERPL-MCNC: 8.3 MG/DL (ref 8.7–10.5)
CALCIUM SERPL-MCNC: 8.6 MG/DL (ref 8.7–10.5)
CALCIUM SERPL-MCNC: 9.4 MG/DL (ref 8.7–10.5)
CALCIUM SERPL-MCNC: 9.5 MG/DL (ref 8.7–10.5)
CALCIUM SERPL-MCNC: 9.5 MG/DL (ref 8.7–10.5)
CALCIUM SERPL-MCNC: NORMAL MG/DL (ref 8.7–10.5)
CHLAMYDIA PNEUMONIAE: NOT DETECTED
CHLORIDE SERPL-SCNC: 100 MMOL/L (ref 95–110)
CHLORIDE SERPL-SCNC: 101 MMOL/L (ref 95–110)
CHLORIDE SERPL-SCNC: 102 MMOL/L (ref 95–110)
CHLORIDE SERPL-SCNC: 103 MMOL/L (ref 95–110)
CHLORIDE SERPL-SCNC: 104 MMOL/L (ref 95–110)
CHLORIDE SERPL-SCNC: 106 MMOL/L (ref 95–110)
CHLORIDE SERPL-SCNC: 98 MMOL/L (ref 95–110)
CHLORIDE SERPL-SCNC: NORMAL MMOL/L (ref 95–110)
CLARITY UR REFRACT.AUTO: ABNORMAL
CLARITY UR REFRACT.AUTO: ABNORMAL
CO2 SERPL-SCNC: 12 MMOL/L (ref 23–29)
CO2 SERPL-SCNC: 17 MMOL/L (ref 23–29)
CO2 SERPL-SCNC: 18 MMOL/L (ref 23–29)
CO2 SERPL-SCNC: 19 MMOL/L (ref 23–29)
CO2 SERPL-SCNC: 20 MMOL/L (ref 23–29)
CO2 SERPL-SCNC: 21 MMOL/L (ref 23–29)
CO2 SERPL-SCNC: 22 MMOL/L (ref 23–29)
CO2 SERPL-SCNC: 22 MMOL/L (ref 23–29)
CO2 SERPL-SCNC: 23 MMOL/L (ref 23–29)
CO2 SERPL-SCNC: 24 MMOL/L (ref 23–29)
CO2 SERPL-SCNC: 24 MMOL/L (ref 23–29)
CO2 SERPL-SCNC: NORMAL MMOL/L (ref 23–29)
CODING SYSTEM: NORMAL
COLOR UR AUTO: ABNORMAL
COLOR UR AUTO: YELLOW
CORONAVIRUS 229E, COMMON COLD VIRUS: NOT DETECTED
CORONAVIRUS HKU1, COMMON COLD VIRUS: NOT DETECTED
CORONAVIRUS NL63, COMMON COLD VIRUS: NOT DETECTED
CORONAVIRUS OC43, COMMON COLD VIRUS: NOT DETECTED
CREAT SERPL-MCNC: 1.8 MG/DL (ref 0.5–1.4)
CREAT SERPL-MCNC: 1.8 MG/DL (ref 0.5–1.4)
CREAT SERPL-MCNC: 2.1 MG/DL (ref 0.5–1.4)
CREAT SERPL-MCNC: 2.1 MG/DL (ref 0.5–1.4)
CREAT SERPL-MCNC: 2.2 MG/DL (ref 0.5–1.4)
CREAT SERPL-MCNC: 2.2 MG/DL (ref 0.5–1.4)
CREAT SERPL-MCNC: 2.5 MG/DL (ref 0.5–1.4)
CREAT SERPL-MCNC: 2.6 MG/DL (ref 0.5–1.4)
CREAT SERPL-MCNC: 2.7 MG/DL (ref 0.5–1.4)
CREAT SERPL-MCNC: 2.8 MG/DL (ref 0.5–1.4)
CREAT SERPL-MCNC: 3 MG/DL (ref 0.5–1.4)
CREAT SERPL-MCNC: 3.3 MG/DL (ref 0.5–1.4)
CREAT SERPL-MCNC: 3.4 MG/DL (ref 0.5–1.4)
CREAT SERPL-MCNC: 3.6 MG/DL (ref 0.5–1.4)
CREAT SERPL-MCNC: 3.6 MG/DL (ref 0.5–1.4)
CREAT SERPL-MCNC: 3.9 MG/DL (ref 0.5–1.4)
CREAT SERPL-MCNC: NORMAL MG/DL (ref 0.5–1.4)
CREAT UR-MCNC: 27 MG/DL (ref 15–325)
CV ECHO LV RWT: 0.21 CM
DELSYS: ABNORMAL
DIFFERENTIAL METHOD: ABNORMAL
DISPENSE STATUS: NORMAL
DOP CALC AO PEAK VEL: 0.81 M/S
DOP CALC AO VTI: 8.98 CM
DOP CALC LVOT AREA: 3.1 CM2
DOP CALC LVOT DIAMETER: 1.98 CM
DOP CALC LVOT PEAK VEL: 0.54 M/S
DOP CALC LVOT STROKE VOLUME: 16.8 CM3
DOP CALCLVOT PEAK VEL VTI: 5.46 CM
E/E' RATIO: 14.14 M/S
ECHO LV POSTERIOR WALL: 0.68 CM (ref 0.6–1.1)
EOSINOPHIL # BLD AUTO: 0 K/UL (ref 0–0.5)
EOSINOPHIL NFR BLD: 0 % (ref 0–8)
ERYTHROCYTE [DISTWIDTH] IN BLOOD BY AUTOMATED COUNT: 16 % (ref 11.5–14.5)
ERYTHROCYTE [DISTWIDTH] IN BLOOD BY AUTOMATED COUNT: 16.4 % (ref 11.5–14.5)
ERYTHROCYTE [DISTWIDTH] IN BLOOD BY AUTOMATED COUNT: 16.4 % (ref 11.5–14.5)
ERYTHROCYTE [DISTWIDTH] IN BLOOD BY AUTOMATED COUNT: 16.5 % (ref 11.5–14.5)
ERYTHROCYTE [DISTWIDTH] IN BLOOD BY AUTOMATED COUNT: 16.7 % (ref 11.5–14.5)
ERYTHROCYTE [DISTWIDTH] IN BLOOD BY AUTOMATED COUNT: 16.8 % (ref 11.5–14.5)
ERYTHROCYTE [DISTWIDTH] IN BLOOD BY AUTOMATED COUNT: 16.9 % (ref 11.5–14.5)
ERYTHROCYTE [DISTWIDTH] IN BLOOD BY AUTOMATED COUNT: 17 % (ref 11.5–14.5)
ERYTHROCYTE [DISTWIDTH] IN BLOOD BY AUTOMATED COUNT: 17.1 % (ref 11.5–14.5)
ERYTHROCYTE [SEDIMENTATION RATE] IN BLOOD BY WESTERGREN METHOD: 14 MM/H
EST. GFR  (AFRICAN AMERICAN): 18 ML/MIN/1.73 M^2
EST. GFR  (AFRICAN AMERICAN): 19.8 ML/MIN/1.73 M^2
EST. GFR  (AFRICAN AMERICAN): 19.8 ML/MIN/1.73 M^2
EST. GFR  (AFRICAN AMERICAN): 21.2 ML/MIN/1.73 M^2
EST. GFR  (AFRICAN AMERICAN): 22 ML/MIN/1.73 M^2
EST. GFR  (AFRICAN AMERICAN): 24.7 ML/MIN/1.73 M^2
EST. GFR  (AFRICAN AMERICAN): 26.8 ML/MIN/1.73 M^2
EST. GFR  (AFRICAN AMERICAN): 28 ML/MIN/1.73 M^2
EST. GFR  (AFRICAN AMERICAN): 29.3 ML/MIN/1.73 M^2
EST. GFR  (AFRICAN AMERICAN): 30.7 ML/MIN/1.73 M^2
EST. GFR  (AFRICAN AMERICAN): 35.9 ML/MIN/1.73 M^2
EST. GFR  (AFRICAN AMERICAN): 35.9 ML/MIN/1.73 M^2
EST. GFR  (AFRICAN AMERICAN): 37.9 ML/MIN/1.73 M^2
EST. GFR  (AFRICAN AMERICAN): 37.9 ML/MIN/1.73 M^2
EST. GFR  (AFRICAN AMERICAN): 45.7 ML/MIN/1.73 M^2
EST. GFR  (AFRICAN AMERICAN): 45.7 ML/MIN/1.73 M^2
EST. GFR  (AFRICAN AMERICAN): NORMAL ML/MIN/1.73 M^2
EST. GFR  (NON AFRICAN AMERICAN): 15.6 ML/MIN/1.73 M^2
EST. GFR  (NON AFRICAN AMERICAN): 17.2 ML/MIN/1.73 M^2
EST. GFR  (NON AFRICAN AMERICAN): 17.2 ML/MIN/1.73 M^2
EST. GFR  (NON AFRICAN AMERICAN): 18.4 ML/MIN/1.73 M^2
EST. GFR  (NON AFRICAN AMERICAN): 19.1 ML/MIN/1.73 M^2
EST. GFR  (NON AFRICAN AMERICAN): 21.4 ML/MIN/1.73 M^2
EST. GFR  (NON AFRICAN AMERICAN): 23.2 ML/MIN/1.73 M^2
EST. GFR  (NON AFRICAN AMERICAN): 24.3 ML/MIN/1.73 M^2
EST. GFR  (NON AFRICAN AMERICAN): 25.4 ML/MIN/1.73 M^2
EST. GFR  (NON AFRICAN AMERICAN): 26.7 ML/MIN/1.73 M^2
EST. GFR  (NON AFRICAN AMERICAN): 31.1 ML/MIN/1.73 M^2
EST. GFR  (NON AFRICAN AMERICAN): 31.1 ML/MIN/1.73 M^2
EST. GFR  (NON AFRICAN AMERICAN): 32.9 ML/MIN/1.73 M^2
EST. GFR  (NON AFRICAN AMERICAN): 32.9 ML/MIN/1.73 M^2
EST. GFR  (NON AFRICAN AMERICAN): 39.7 ML/MIN/1.73 M^2
EST. GFR  (NON AFRICAN AMERICAN): 39.7 ML/MIN/1.73 M^2
EST. GFR  (NON AFRICAN AMERICAN): NORMAL ML/MIN/1.73 M^2
FACT VIII ACT/NOR PPP: 226 % (ref 60–170)
FERRITIN SERPL-MCNC: 235 NG/ML (ref 20–300)
FIBRINOGEN PPP-MCNC: 255 MG/DL (ref 182–366)
FIO2: 40
FIO2: 50
FIO2: 50
FLUBV RNA NPH QL NAA+NON-PROBE: NOT DETECTED
FRACTIONAL SHORTENING: 2 % (ref 28–44)
GLUCOSE SERPL-MCNC: 126 MG/DL (ref 70–110)
GLUCOSE SERPL-MCNC: 139 MG/DL (ref 70–110)
GLUCOSE SERPL-MCNC: 144 MG/DL (ref 70–110)
GLUCOSE SERPL-MCNC: 146 MG/DL (ref 70–110)
GLUCOSE SERPL-MCNC: 147 MG/DL (ref 70–110)
GLUCOSE SERPL-MCNC: 149 MG/DL (ref 70–110)
GLUCOSE SERPL-MCNC: 149 MG/DL (ref 70–110)
GLUCOSE SERPL-MCNC: 150 MG/DL (ref 70–110)
GLUCOSE SERPL-MCNC: 155 MG/DL (ref 70–110)
GLUCOSE SERPL-MCNC: 155 MG/DL (ref 70–110)
GLUCOSE SERPL-MCNC: 173 MG/DL (ref 70–110)
GLUCOSE SERPL-MCNC: 173 MG/DL (ref 70–110)
GLUCOSE SERPL-MCNC: 199 MG/DL (ref 70–110)
GLUCOSE SERPL-MCNC: 199 MG/DL (ref 70–110)
GLUCOSE SERPL-MCNC: 209 MG/DL (ref 70–110)
GLUCOSE SERPL-MCNC: 216 MG/DL (ref 70–110)
GLUCOSE SERPL-MCNC: 233 MG/DL (ref 70–110)
GLUCOSE SERPL-MCNC: 240 MG/DL (ref 70–110)
GLUCOSE SERPL-MCNC: NORMAL MG/DL (ref 70–110)
GLUCOSE UR QL STRIP: NEGATIVE
GLUCOSE UR QL STRIP: NEGATIVE
HCO3 UR-SCNC: 11.8 MMOL/L (ref 24–28)
HCO3 UR-SCNC: 13 MMOL/L (ref 24–28)
HCO3 UR-SCNC: 19 MMOL/L (ref 24–28)
HCO3 UR-SCNC: 21.9 MMOL/L (ref 24–28)
HCO3 UR-SCNC: 22.3 MMOL/L (ref 24–28)
HCO3 UR-SCNC: 22.7 MMOL/L (ref 24–28)
HCO3 UR-SCNC: 23.4 MMOL/L (ref 24–28)
HCO3 UR-SCNC: 23.9 MMOL/L (ref 24–28)
HCO3 UR-SCNC: 24.2 MMOL/L (ref 24–28)
HCO3 UR-SCNC: 25.3 MMOL/L (ref 24–28)
HCO3 UR-SCNC: 25.7 MMOL/L (ref 24–28)
HCT VFR BLD AUTO: 31.1 % (ref 37–48.5)
HCT VFR BLD AUTO: 32.5 % (ref 37–48.5)
HCT VFR BLD AUTO: 34.4 % (ref 37–48.5)
HCT VFR BLD AUTO: 34.6 % (ref 37–48.5)
HCT VFR BLD AUTO: 34.8 % (ref 37–48.5)
HCT VFR BLD AUTO: 35.1 % (ref 37–48.5)
HCT VFR BLD AUTO: 35.5 % (ref 37–48.5)
HCT VFR BLD AUTO: 36.1 % (ref 37–48.5)
HCT VFR BLD AUTO: 36.4 % (ref 37–48.5)
HCT VFR BLD AUTO: 36.7 % (ref 37–48.5)
HCT VFR BLD AUTO: 37.5 % (ref 37–48.5)
HGB BLD-MCNC: 10 G/DL (ref 12–16)
HGB BLD-MCNC: 10.6 G/DL (ref 12–16)
HGB BLD-MCNC: 10.7 G/DL (ref 12–16)
HGB BLD-MCNC: 10.8 G/DL (ref 12–16)
HGB BLD-MCNC: 10.9 G/DL (ref 12–16)
HGB BLD-MCNC: 11.2 G/DL (ref 12–16)
HGB BLD-MCNC: 11.3 G/DL (ref 12–16)
HGB BLD-MCNC: 11.3 G/DL (ref 12–16)
HGB BLD-MCNC: 9.9 G/DL (ref 12–16)
HGB UR QL STRIP: ABNORMAL
HGB UR QL STRIP: ABNORMAL
HPIV1 RNA NPH QL NAA+NON-PROBE: NOT DETECTED
HPIV2 RNA NPH QL NAA+NON-PROBE: NOT DETECTED
HPIV3 RNA NPH QL NAA+NON-PROBE: NOT DETECTED
HPIV4 RNA NPH QL NAA+NON-PROBE: NOT DETECTED
HUMAN METAPNEUMOVIRUS: NOT DETECTED
HYALINE CASTS UR QL AUTO: 2 /LPF
HYALINE CASTS UR QL AUTO: 24 /LPF
IMM GRANULOCYTES # BLD AUTO: 0.04 K/UL (ref 0–0.04)
IMM GRANULOCYTES # BLD AUTO: 0.05 K/UL (ref 0–0.04)
IMM GRANULOCYTES # BLD AUTO: 0.06 K/UL (ref 0–0.04)
IMM GRANULOCYTES # BLD AUTO: 0.06 K/UL (ref 0–0.04)
IMM GRANULOCYTES # BLD AUTO: 0.09 K/UL (ref 0–0.04)
IMM GRANULOCYTES # BLD AUTO: 0.09 K/UL (ref 0–0.04)
IMM GRANULOCYTES # BLD AUTO: 0.1 K/UL (ref 0–0.04)
IMM GRANULOCYTES # BLD AUTO: 0.13 K/UL (ref 0–0.04)
IMM GRANULOCYTES # BLD AUTO: 0.17 K/UL (ref 0–0.04)
IMM GRANULOCYTES NFR BLD AUTO: 0.4 % (ref 0–0.5)
IMM GRANULOCYTES NFR BLD AUTO: 0.5 % (ref 0–0.5)
IMM GRANULOCYTES NFR BLD AUTO: 0.5 % (ref 0–0.5)
IMM GRANULOCYTES NFR BLD AUTO: 0.6 % (ref 0–0.5)
IMM GRANULOCYTES NFR BLD AUTO: 0.9 % (ref 0–0.5)
IMM GRANULOCYTES NFR BLD AUTO: 1 % (ref 0–0.5)
IMM GRANULOCYTES NFR BLD AUTO: 1.1 % (ref 0–0.5)
INFLUENZA A (SUBTYPES H1,H1-2009,H3): NOT DETECTED
INR PPP: 1.5 (ref 0.8–1.2)
INR PPP: 1.5 (ref 0.8–1.2)
INR PPP: 1.7 (ref 0.8–1.2)
INR PPP: 2 (ref 0.8–1.2)
INR PPP: 2.1 (ref 0.8–1.2)
INTERVENTRICULAR SEPTUM: 0.61 CM (ref 0.6–1.1)
IRON SERPL-MCNC: 22 UG/DL (ref 30–160)
KETONES UR QL STRIP: NEGATIVE
KETONES UR QL STRIP: NEGATIVE
LA MAJOR: 5.77 CM
LA MINOR: 5.54 CM
LA WIDTH: 3.9 CM
LACTATE SERPL-SCNC: 2 MMOL/L (ref 0.5–2.2)
LACTATE SERPL-SCNC: 2.2 MMOL/L (ref 0.5–2.2)
LACTATE SERPL-SCNC: 3.1 MMOL/L (ref 0.5–2.2)
LACTATE SERPL-SCNC: 7.3 MMOL/L (ref 0.5–2.2)
LEFT ATRIUM SIZE: 3.95 CM
LEFT ATRIUM VOLUME INDEX: 44.7 ML/M2
LEFT ATRIUM VOLUME: 74.02 CM3
LEFT INTERNAL DIMENSION IN SYSTOLE: 6.4 CM (ref 2.1–4)
LEFT VENTRICLE DIASTOLIC VOLUME INDEX: 117.68 ML/M2
LEFT VENTRICLE DIASTOLIC VOLUME: 194.73 ML
LEFT VENTRICLE MASS INDEX: 100 G/M2
LEFT VENTRICLE SYSTOLIC VOLUME INDEX: 105.9 ML/M2
LEFT VENTRICLE SYSTOLIC VOLUME: 175.18 ML
LEFT VENTRICULAR INTERNAL DIMENSION IN DIASTOLE: 6.5 CM (ref 3.5–6)
LEFT VENTRICULAR MASS: 165.42 G
LEUKOCYTE ESTERASE UR QL STRIP: NEGATIVE
LEUKOCYTE ESTERASE UR QL STRIP: NEGATIVE
LIPASE SERPL-CCNC: 74 U/L (ref 4–60)
LV LATERAL E/E' RATIO: 11 M/S
LV SEPTAL E/E' RATIO: 19.8 M/S
LYMPHOCYTES # BLD AUTO: 0.4 K/UL (ref 1–4.8)
LYMPHOCYTES # BLD AUTO: 0.5 K/UL (ref 1–4.8)
LYMPHOCYTES # BLD AUTO: 0.6 K/UL (ref 1–4.8)
LYMPHOCYTES # BLD AUTO: 0.7 K/UL (ref 1–4.8)
LYMPHOCYTES # BLD AUTO: 0.8 K/UL (ref 1–4.8)
LYMPHOCYTES # BLD AUTO: 0.8 K/UL (ref 1–4.8)
LYMPHOCYTES NFR BLD: 3.8 % (ref 18–48)
LYMPHOCYTES NFR BLD: 4 % (ref 18–48)
LYMPHOCYTES NFR BLD: 4 % (ref 18–48)
LYMPHOCYTES NFR BLD: 4.1 % (ref 18–48)
LYMPHOCYTES NFR BLD: 4.3 % (ref 18–48)
LYMPHOCYTES NFR BLD: 4.5 % (ref 18–48)
LYMPHOCYTES NFR BLD: 5 % (ref 18–48)
LYMPHOCYTES NFR BLD: 5 % (ref 18–48)
LYMPHOCYTES NFR BLD: 5.3 % (ref 18–48)
LYMPHOCYTES NFR BLD: 5.4 % (ref 18–48)
LYMPHOCYTES NFR BLD: 5.6 % (ref 18–48)
MAGNESIUM SERPL-MCNC: 2.1 MG/DL (ref 1.6–2.6)
MAGNESIUM SERPL-MCNC: 2.1 MG/DL (ref 1.6–2.6)
MAGNESIUM SERPL-MCNC: 2.2 MG/DL (ref 1.6–2.6)
MCH RBC QN AUTO: 26.5 PG (ref 27–31)
MCH RBC QN AUTO: 26.7 PG (ref 27–31)
MCH RBC QN AUTO: 26.7 PG (ref 27–31)
MCH RBC QN AUTO: 26.8 PG (ref 27–31)
MCH RBC QN AUTO: 26.8 PG (ref 27–31)
MCH RBC QN AUTO: 26.9 PG (ref 27–31)
MCH RBC QN AUTO: 27 PG (ref 27–31)
MCH RBC QN AUTO: 27 PG (ref 27–31)
MCH RBC QN AUTO: 27.3 PG (ref 27–31)
MCH RBC QN AUTO: 27.3 PG (ref 27–31)
MCH RBC QN AUTO: 27.4 PG (ref 27–31)
MCHC RBC AUTO-ENTMCNC: 29.4 G/DL (ref 32–36)
MCHC RBC AUTO-ENTMCNC: 29.9 G/DL (ref 32–36)
MCHC RBC AUTO-ENTMCNC: 30.4 G/DL (ref 32–36)
MCHC RBC AUTO-ENTMCNC: 30.5 G/DL (ref 32–36)
MCHC RBC AUTO-ENTMCNC: 30.5 G/DL (ref 32–36)
MCHC RBC AUTO-ENTMCNC: 31 G/DL (ref 32–36)
MCHC RBC AUTO-ENTMCNC: 31.1 G/DL (ref 32–36)
MCHC RBC AUTO-ENTMCNC: 31.1 G/DL (ref 32–36)
MCHC RBC AUTO-ENTMCNC: 31.2 G/DL (ref 32–36)
MCHC RBC AUTO-ENTMCNC: 31.3 G/DL (ref 32–36)
MCHC RBC AUTO-ENTMCNC: 32.2 G/DL (ref 32–36)
MCV RBC AUTO: 85 FL (ref 82–98)
MCV RBC AUTO: 86 FL (ref 82–98)
MCV RBC AUTO: 87 FL (ref 82–98)
MCV RBC AUTO: 87 FL (ref 82–98)
MCV RBC AUTO: 88 FL (ref 82–98)
MCV RBC AUTO: 89 FL (ref 82–98)
MCV RBC AUTO: 89 FL (ref 82–98)
MCV RBC AUTO: 90 FL (ref 82–98)
MCV RBC AUTO: 90 FL (ref 82–98)
METHEMOGLOBIN: 0.6 % (ref 0–3)
METHEMOGLOBIN: 0.8 % (ref 0–3)
MICROSCOPIC COMMENT: ABNORMAL
MICROSCOPIC COMMENT: ABNORMAL
MIN VOL: 5.31
MIN VOL: 5.98
MIN VOL: 8.43
MODE: ABNORMAL
MONOCYTES # BLD AUTO: 0.6 K/UL (ref 0.3–1)
MONOCYTES # BLD AUTO: 0.7 K/UL (ref 0.3–1)
MONOCYTES # BLD AUTO: 0.8 K/UL (ref 0.3–1)
MONOCYTES # BLD AUTO: 0.8 K/UL (ref 0.3–1)
MONOCYTES # BLD AUTO: 0.9 K/UL (ref 0.3–1)
MONOCYTES # BLD AUTO: 0.9 K/UL (ref 0.3–1)
MONOCYTES # BLD AUTO: 1.1 K/UL (ref 0.3–1)
MONOCYTES # BLD AUTO: 1.1 K/UL (ref 0.3–1)
MONOCYTES # BLD AUTO: 1.6 K/UL (ref 0.3–1)
MONOCYTES NFR BLD: 5.9 % (ref 4–15)
MONOCYTES NFR BLD: 6.2 % (ref 4–15)
MONOCYTES NFR BLD: 7.4 % (ref 4–15)
MONOCYTES NFR BLD: 7.4 % (ref 4–15)
MONOCYTES NFR BLD: 7.6 % (ref 4–15)
MONOCYTES NFR BLD: 7.7 % (ref 4–15)
MONOCYTES NFR BLD: 7.7 % (ref 4–15)
MONOCYTES NFR BLD: 7.9 % (ref 4–15)
MONOCYTES NFR BLD: 8.4 % (ref 4–15)
MONOCYTES NFR BLD: 8.7 % (ref 4–15)
MONOCYTES NFR BLD: 9.1 % (ref 4–15)
MV PEAK E VEL: 0.99 M/S
MYCOPLASMA PNEUMONIAE: NOT DETECTED
NEUTROPHILS # BLD AUTO: 12 K/UL (ref 1.8–7.7)
NEUTROPHILS # BLD AUTO: 12.3 K/UL (ref 1.8–7.7)
NEUTROPHILS # BLD AUTO: 13.2 K/UL (ref 1.8–7.7)
NEUTROPHILS # BLD AUTO: 15.4 K/UL (ref 1.8–7.7)
NEUTROPHILS # BLD AUTO: 8 K/UL (ref 1.8–7.7)
NEUTROPHILS # BLD AUTO: 8.2 K/UL (ref 1.8–7.7)
NEUTROPHILS # BLD AUTO: 8.3 K/UL (ref 1.8–7.7)
NEUTROPHILS # BLD AUTO: 8.4 K/UL (ref 1.8–7.7)
NEUTROPHILS # BLD AUTO: 8.4 K/UL (ref 1.8–7.7)
NEUTROPHILS # BLD AUTO: 8.8 K/UL (ref 1.8–7.7)
NEUTROPHILS # BLD AUTO: 9.9 K/UL (ref 1.8–7.7)
NEUTROPHILS NFR BLD: 85.3 % (ref 38–73)
NEUTROPHILS NFR BLD: 86.2 % (ref 38–73)
NEUTROPHILS NFR BLD: 86.2 % (ref 38–73)
NEUTROPHILS NFR BLD: 86.3 % (ref 38–73)
NEUTROPHILS NFR BLD: 86.4 % (ref 38–73)
NEUTROPHILS NFR BLD: 86.5 % (ref 38–73)
NEUTROPHILS NFR BLD: 86.7 % (ref 38–73)
NEUTROPHILS NFR BLD: 87.7 % (ref 38–73)
NEUTROPHILS NFR BLD: 87.8 % (ref 38–73)
NEUTROPHILS NFR BLD: 88.9 % (ref 38–73)
NEUTROPHILS NFR BLD: 89.4 % (ref 38–73)
NITRITE UR QL STRIP: NEGATIVE
NITRITE UR QL STRIP: NEGATIVE
NRBC BLD-RTO: 0 /100 WBC
NRBC BLD-RTO: 1 /100 WBC
NRBC BLD-RTO: 2 /100 WBC
NRBC BLD-RTO: 2 /100 WBC
NRBC BLD-RTO: 3 /100 WBC
NUM UNITS TRANS FFP: NORMAL
PCO2 BLDA: 25.2 MMHG (ref 35–45)
PCO2 BLDA: 28 MMHG (ref 35–45)
PCO2 BLDA: 37.3 MMHG (ref 35–45)
PCO2 BLDA: 38.8 MMHG (ref 35–45)
PCO2 BLDA: 38.8 MMHG (ref 35–45)
PCO2 BLDA: 40 MMHG (ref 35–45)
PCO2 BLDA: 43.4 MMHG (ref 35–45)
PCO2 BLDA: 43.6 MMHG (ref 35–45)
PCO2 BLDA: 43.9 MMHG (ref 35–45)
PCO2 BLDA: 47.5 MMHG (ref 35–45)
PCO2 BLDA: 48.9 MMHG (ref 35–45)
PEEP: 8
PH SMN: 7.12 [PH] (ref 7.35–7.45)
PH SMN: 7.28 [PH] (ref 7.35–7.45)
PH SMN: 7.32 [PH] (ref 7.35–7.45)
PH SMN: 7.33 [PH] (ref 7.35–7.45)
PH SMN: 7.35 [PH] (ref 7.35–7.45)
PH SMN: 7.36 [PH] (ref 7.35–7.45)
PH SMN: 7.39 [PH] (ref 7.35–7.45)
PH SMN: 7.43 [PH] (ref 7.35–7.45)
PH SMN: 7.44 [PH] (ref 7.35–7.45)
PH UR STRIP: 5 [PH] (ref 5–8)
PH UR STRIP: 5 [PH] (ref 5–8)
PHOSPHATE SERPL-MCNC: 4.5 MG/DL (ref 2.7–4.5)
PHOSPHATE SERPL-MCNC: 4.6 MG/DL (ref 2.7–4.5)
PHOSPHATE SERPL-MCNC: 4.7 MG/DL (ref 2.7–4.5)
PHOSPHATE SERPL-MCNC: 5 MG/DL (ref 2.7–4.5)
PHOSPHATE SERPL-MCNC: 5 MG/DL (ref 2.7–4.5)
PHOSPHATE SERPL-MCNC: 5.1 MG/DL (ref 2.7–4.5)
PHOSPHATE SERPL-MCNC: 5.1 MG/DL (ref 2.7–4.5)
PHOSPHATE SERPL-MCNC: 5.2 MG/DL (ref 2.7–4.5)
PHOSPHATE SERPL-MCNC: 5.2 MG/DL (ref 2.7–4.5)
PHOSPHATE SERPL-MCNC: 5.4 MG/DL (ref 2.7–4.5)
PHOSPHATE SERPL-MCNC: 6.6 MG/DL (ref 2.7–4.5)
PHOSPHATE SERPL-MCNC: NORMAL MG/DL (ref 2.7–4.5)
PIP: 11
PIP: 12
PIP: 23
PISA TR MAX VEL: 2.04 M/S
PLATELET # BLD AUTO: 51 K/UL (ref 150–350)
PLATELET # BLD AUTO: 56 K/UL (ref 150–350)
PLATELET # BLD AUTO: 75 K/UL (ref 150–350)
PLATELET # BLD AUTO: 80 K/UL (ref 150–350)
PLATELET # BLD AUTO: 81 K/UL (ref 150–350)
PLATELET # BLD AUTO: 84 K/UL (ref 150–350)
PLATELET # BLD AUTO: 84 K/UL (ref 150–350)
PLATELET # BLD AUTO: 91 K/UL (ref 150–350)
PLATELET # BLD AUTO: 94 K/UL (ref 150–350)
PLATELET # BLD AUTO: 97 K/UL (ref 150–350)
PLATELET # BLD AUTO: 98 K/UL (ref 150–350)
PMV BLD AUTO: 11.1 FL (ref 9.2–12.9)
PMV BLD AUTO: 11.7 FL (ref 9.2–12.9)
PMV BLD AUTO: 11.7 FL (ref 9.2–12.9)
PMV BLD AUTO: 12.1 FL (ref 9.2–12.9)
PMV BLD AUTO: 12.5 FL (ref 9.2–12.9)
PMV BLD AUTO: 12.6 FL (ref 9.2–12.9)
PMV BLD AUTO: 12.6 FL (ref 9.2–12.9)
PMV BLD AUTO: 12.7 FL (ref 9.2–12.9)
PMV BLD AUTO: 12.8 FL (ref 9.2–12.9)
PMV BLD AUTO: 13.1 FL (ref 9.2–12.9)
PMV BLD AUTO: ABNORMAL FL (ref 9.2–12.9)
PO2 BLDA: 145 MMHG (ref 80–100)
PO2 BLDA: 21 MMHG (ref 40–60)
PO2 BLDA: 24 MMHG (ref 40–60)
PO2 BLDA: 26 MMHG (ref 40–60)
PO2 BLDA: 26 MMHG (ref 40–60)
PO2 BLDA: 32 MMHG (ref 40–60)
PO2 BLDA: 33 MMHG (ref 80–100)
PO2 BLDA: 37 MMHG (ref 40–60)
PO2 BLDA: 37 MMHG (ref 40–60)
PO2 BLDA: 68 MMHG (ref 80–100)
PO2 BLDA: 92 MMHG (ref 80–100)
POC BE: -1 MMOL/L
POC BE: -15 MMOL/L
POC BE: -16 MMOL/L
POC BE: -2 MMOL/L
POC BE: -4 MMOL/L
POC BE: -4 MMOL/L
POC BE: -5 MMOL/L
POC BE: 1 MMOL/L
POC PCO2 TEMP: 46.5 MMHG
POC PH TEMP: 7.32
POC PO2 TEMP: 28 MMHG
POC SATURATED O2: 36 % (ref 95–100)
POC SATURATED O2: 39 % (ref 95–100)
POC SATURATED O2: 43 % (ref 95–100)
POC SATURATED O2: 46 % (ref 95–100)
POC SATURATED O2: 48 % (ref 95–100)
POC SATURATED O2: 56 % (ref 95–100)
POC SATURATED O2: 64 % (ref 95–100)
POC SATURATED O2: 65 % (ref 95–100)
POC SATURATED O2: 91 % (ref 95–100)
POC SATURATED O2: 97 % (ref 95–100)
POC SATURATED O2: 99 % (ref 95–100)
POC TCO2: 13 MMOL/L (ref 23–27)
POC TCO2: 14 MMOL/L (ref 23–27)
POC TCO2: 20 MMOL/L (ref 23–27)
POC TCO2: 23 MMOL/L (ref 23–27)
POC TCO2: 24 MMOL/L (ref 24–29)
POC TCO2: 24 MMOL/L (ref 24–29)
POC TCO2: 25 MMOL/L (ref 24–29)
POC TCO2: 25 MMOL/L (ref 24–29)
POC TCO2: 26 MMOL/L (ref 24–29)
POC TCO2: 27 MMOL/L (ref 24–29)
POC TCO2: 27 MMOL/L (ref 24–29)
POC TEMPERATURE: ABNORMAL
POCT GLUCOSE: 121 MG/DL (ref 70–110)
POCT GLUCOSE: 137 MG/DL (ref 70–110)
POCT GLUCOSE: 140 MG/DL (ref 70–110)
POCT GLUCOSE: 146 MG/DL (ref 70–110)
POCT GLUCOSE: 146 MG/DL (ref 70–110)
POCT GLUCOSE: 164 MG/DL (ref 70–110)
POCT GLUCOSE: 167 MG/DL (ref 70–110)
POCT GLUCOSE: 169 MG/DL (ref 70–110)
POCT GLUCOSE: 187 MG/DL (ref 70–110)
POCT GLUCOSE: 188 MG/DL (ref 70–110)
POCT GLUCOSE: 222 MG/DL (ref 70–110)
POCT GLUCOSE: 244 MG/DL (ref 70–110)
POCT GLUCOSE: 249 MG/DL (ref 70–110)
POCT GLUCOSE: 276 MG/DL (ref 70–110)
POCT GLUCOSE: 420 MG/DL (ref 70–110)
POTASSIUM SERPL-SCNC: 2.6 MMOL/L (ref 3.5–5.1)
POTASSIUM SERPL-SCNC: 2.7 MMOL/L (ref 3.5–5.1)
POTASSIUM SERPL-SCNC: 3.5 MMOL/L (ref 3.5–5.1)
POTASSIUM SERPL-SCNC: 3.8 MMOL/L (ref 3.5–5.1)
POTASSIUM SERPL-SCNC: 4.1 MMOL/L (ref 3.5–5.1)
POTASSIUM SERPL-SCNC: 4.1 MMOL/L (ref 3.5–5.1)
POTASSIUM SERPL-SCNC: 4.2 MMOL/L (ref 3.5–5.1)
POTASSIUM SERPL-SCNC: 4.3 MMOL/L (ref 3.5–5.1)
POTASSIUM SERPL-SCNC: 5.2 MMOL/L (ref 3.5–5.1)
POTASSIUM SERPL-SCNC: 5.2 MMOL/L (ref 3.5–5.1)
POTASSIUM SERPL-SCNC: 5.3 MMOL/L (ref 3.5–5.1)
POTASSIUM SERPL-SCNC: NORMAL MMOL/L (ref 3.5–5.1)
PROT SERPL-MCNC: 6.5 G/DL (ref 6–8.4)
PROT SERPL-MCNC: 6.6 G/DL (ref 6–8.4)
PROT SERPL-MCNC: 6.9 G/DL (ref 6–8.4)
PROT SERPL-MCNC: 7.3 G/DL (ref 6–8.4)
PROT SERPL-MCNC: 7.5 G/DL (ref 6–8.4)
PROT SERPL-MCNC: 7.8 G/DL (ref 6–8.4)
PROT SERPL-MCNC: 7.8 G/DL (ref 6–8.4)
PROT UR QL STRIP: ABNORMAL
PROT UR QL STRIP: NEGATIVE
PROT UR-MCNC: 16 MG/DL (ref 0–15)
PROT/CREAT UR: 0.59 MG/G{CREAT} (ref 0–0.2)
PROTHROMBIN TIME: 14.7 SEC (ref 9–12.5)
PROTHROMBIN TIME: 14.8 SEC (ref 9–12.5)
PROTHROMBIN TIME: 16.8 SEC (ref 9–12.5)
PROTHROMBIN TIME: 18.8 SEC (ref 9–12.5)
PROTHROMBIN TIME: 20.1 SEC (ref 9–12.5)
RA MAJOR: 5.01 CM
RA WIDTH: 3.3 CM
RBC # BLD AUTO: 3.66 M/UL (ref 4–5.4)
RBC # BLD AUTO: 3.67 M/UL (ref 4–5.4)
RBC # BLD AUTO: 3.95 M/UL (ref 4–5.4)
RBC # BLD AUTO: 3.97 M/UL (ref 4–5.4)
RBC # BLD AUTO: 3.97 M/UL (ref 4–5.4)
RBC # BLD AUTO: 3.98 M/UL (ref 4–5.4)
RBC # BLD AUTO: 4.01 M/UL (ref 4–5.4)
RBC # BLD AUTO: 4.08 M/UL (ref 4–5.4)
RBC # BLD AUTO: 4.19 M/UL (ref 4–5.4)
RBC # BLD AUTO: 4.21 M/UL (ref 4–5.4)
RBC # BLD AUTO: 4.21 M/UL (ref 4–5.4)
RBC #/AREA URNS AUTO: 6 /HPF (ref 0–4)
RBC #/AREA URNS AUTO: 6 /HPF (ref 0–4)
RESPIRATORY INFECTION PANEL SOURCE: NORMAL
RIGHT VENTRICULAR END-DIASTOLIC DIMENSION: 4.1 CM
RSV RNA NPH QL NAA+NON-PROBE: NOT DETECTED
RV TISSUE DOPPLER FREE WALL SYSTOLIC VELOCITY 1 (APICAL 4 CHAMBER VIEW): 7.87 CM/S
RV+EV RNA NPH QL NAA+NON-PROBE: NOT DETECTED
SAMPLE: ABNORMAL
SARS-COV-2 IGG SERPLBLD QL IA.RAPID: NEGATIVE
SARS-COV-2 RDRP RESP QL NAA+PROBE: NEGATIVE
SATURATED IRON: 7 % (ref 20–50)
SINUS: 3.18 CM
SITE: ABNORMAL
SODIUM SERPL-SCNC: 136 MMOL/L (ref 136–145)
SODIUM SERPL-SCNC: 136 MMOL/L (ref 136–145)
SODIUM SERPL-SCNC: 138 MMOL/L (ref 136–145)
SODIUM SERPL-SCNC: 139 MMOL/L (ref 136–145)
SODIUM SERPL-SCNC: 140 MMOL/L (ref 136–145)
SODIUM SERPL-SCNC: 141 MMOL/L (ref 136–145)
SODIUM SERPL-SCNC: 142 MMOL/L (ref 136–145)
SODIUM SERPL-SCNC: NORMAL MMOL/L (ref 136–145)
SODIUM UR-SCNC: 53 MMOL/L (ref 20–250)
SP GR UR STRIP: 1.01 (ref 1–1.03)
SP GR UR STRIP: 1.01 (ref 1–1.03)
SP02: 100
SP02: 97
SP02: 98
SQUAMOUS #/AREA URNS AUTO: 0 /HPF
SQUAMOUS #/AREA URNS AUTO: 1 /HPF
T4 FREE SERPL-MCNC: 0.7 NG/DL (ref 0.71–1.51)
TDI LATERAL: 0.09 M/S
TDI SEPTAL: 0.05 M/S
TDI: 0.07 M/S
TOTAL IRON BINDING CAPACITY: 326 UG/DL (ref 250–450)
TR MAX PG: 17 MMHG
TRANSFERRIN SERPL-MCNC: 220 MG/DL (ref 200–375)
TRICUSPID ANNULAR PLANE SYSTOLIC EXCURSION: 1.03 CM
TSH SERPL DL<=0.005 MIU/L-ACNC: 1.72 UIU/ML (ref 0.4–4)
URN SPEC COLLECT METH UR: ABNORMAL
URN SPEC COLLECT METH UR: ABNORMAL
VANCOMYCIN SERPL-MCNC: 20.4 UG/ML
VANCOMYCIN SERPL-MCNC: 21.9 UG/ML
VANCOMYCIN SERPL-MCNC: 27.1 UG/ML
VANCOMYCIN SERPL-MCNC: 35.4 UG/ML
VT: 300
VT: 307
WBC # BLD AUTO: 10.26 K/UL (ref 3.9–12.7)
WBC # BLD AUTO: 11.5 K/UL (ref 3.9–12.7)
WBC # BLD AUTO: 13.49 K/UL (ref 3.9–12.7)
WBC # BLD AUTO: 14.17 K/UL (ref 3.9–12.7)
WBC # BLD AUTO: 15.32 K/UL (ref 3.9–12.7)
WBC # BLD AUTO: 17.86 K/UL (ref 3.9–12.7)
WBC # BLD AUTO: 9.11 K/UL (ref 3.9–12.7)
WBC # BLD AUTO: 9.39 K/UL (ref 3.9–12.7)
WBC # BLD AUTO: 9.46 K/UL (ref 3.9–12.7)
WBC # BLD AUTO: 9.59 K/UL (ref 3.9–12.7)
WBC # BLD AUTO: 9.65 K/UL (ref 3.9–12.7)
WBC #/AREA URNS AUTO: 3 /HPF (ref 0–5)
WBC #/AREA URNS AUTO: 7 /HPF (ref 0–5)
YEAST UR QL AUTO: ABNORMAL

## 2020-01-01 PROCEDURE — 25000003 PHARM REV CODE 250: Mod: NTX | Performed by: HOSPITALIST

## 2020-01-01 PROCEDURE — 83540 ASSAY OF IRON: CPT

## 2020-01-01 PROCEDURE — 63600175 PHARM REV CODE 636 W HCPCS: Mod: NTX | Performed by: INTERNAL MEDICINE

## 2020-01-01 PROCEDURE — 25000003 PHARM REV CODE 250: Mod: NTX | Performed by: STUDENT IN AN ORGANIZED HEALTH CARE EDUCATION/TRAINING PROGRAM

## 2020-01-01 PROCEDURE — 99291 CRITICAL CARE FIRST HOUR: CPT | Mod: NTX,,, | Performed by: INTERNAL MEDICINE

## 2020-01-01 PROCEDURE — 82803 BLOOD GASES ANY COMBINATION: CPT

## 2020-01-01 PROCEDURE — 63600367 HC NITRIC OXIDE PER HOUR: Mod: NTX

## 2020-01-01 PROCEDURE — 99233 SBSQ HOSP IP/OBS HIGH 50: CPT | Mod: NTX,,, | Performed by: PSYCHIATRY & NEUROLOGY

## 2020-01-01 PROCEDURE — 99291 PR CRITICAL CARE, E/M 30-74 MINUTES: ICD-10-PCS | Mod: NTX,,, | Performed by: INTERNAL MEDICINE

## 2020-01-01 PROCEDURE — 99233 SBSQ HOSP IP/OBS HIGH 50: CPT | Mod: 25,NTX,, | Performed by: PSYCHIATRY & NEUROLOGY

## 2020-01-01 PROCEDURE — 82140 ASSAY OF AMMONIA: CPT | Mod: NTX

## 2020-01-01 PROCEDURE — 27200966 HC CLOSED SUCTION SYSTEM: Mod: NTX

## 2020-01-01 PROCEDURE — 83605 ASSAY OF LACTIC ACID: CPT | Mod: NTX

## 2020-01-01 PROCEDURE — S0028 INJECTION, FAMOTIDINE, 20 MG: HCPCS | Performed by: STUDENT IN AN ORGANIZED HEALTH CARE EDUCATION/TRAINING PROGRAM

## 2020-01-01 PROCEDURE — 63600175 PHARM REV CODE 636 W HCPCS: Performed by: INTERNAL MEDICINE

## 2020-01-01 PROCEDURE — 27000221 HC OXYGEN, UP TO 24 HOURS: Mod: NTX

## 2020-01-01 PROCEDURE — 99233 SBSQ HOSP IP/OBS HIGH 50: CPT | Mod: NTX,,, | Performed by: NURSE PRACTITIONER

## 2020-01-01 PROCEDURE — 99233 PR SUBSEQUENT HOSPITAL CARE,LEVL III: ICD-10-PCS | Mod: NTX,,, | Performed by: NURSE PRACTITIONER

## 2020-01-01 PROCEDURE — 36430 TRANSFUSION BLD/BLD COMPNT: CPT

## 2020-01-01 PROCEDURE — 85025 COMPLETE CBC W/AUTO DIFF WBC: CPT | Mod: 91,NTX

## 2020-01-01 PROCEDURE — 83735 ASSAY OF MAGNESIUM: CPT | Mod: NTX

## 2020-01-01 PROCEDURE — 85025 COMPLETE CBC W/AUTO DIFF WBC: CPT

## 2020-01-01 PROCEDURE — 80053 COMPREHEN METABOLIC PANEL: CPT | Mod: NTX

## 2020-01-01 PROCEDURE — 95718 EEG PHYS/QHP 2-12 HR W/VEEG: CPT | Mod: NTX,,, | Performed by: PSYCHIATRY & NEUROLOGY

## 2020-01-01 PROCEDURE — 83880 ASSAY OF NATRIURETIC PEPTIDE: CPT

## 2020-01-01 PROCEDURE — S0028 INJECTION, FAMOTIDINE, 20 MG: HCPCS | Mod: NTX | Performed by: INTERNAL MEDICINE

## 2020-01-01 PROCEDURE — 82803 BLOOD GASES ANY COMBINATION: CPT | Mod: NTX

## 2020-01-01 PROCEDURE — 82330 ASSAY OF CALCIUM: CPT

## 2020-01-01 PROCEDURE — 86769 SARS-COV-2 COVID-19 ANTIBODY: CPT | Mod: NTX

## 2020-01-01 PROCEDURE — 84443 ASSAY THYROID STIM HORMONE: CPT | Mod: NTX

## 2020-01-01 PROCEDURE — 94003 VENT MGMT INPAT SUBQ DAY: CPT | Mod: NTX

## 2020-01-01 PROCEDURE — 80053 COMPREHEN METABOLIC PANEL: CPT

## 2020-01-01 PROCEDURE — 99233 PR SUBSEQUENT HOSPITAL CARE,LEVL III: ICD-10-PCS | Mod: ,,, | Performed by: NURSE PRACTITIONER

## 2020-01-01 PROCEDURE — 95714 VEEG EA 12-26 HR UNMNTR: CPT | Mod: NTX

## 2020-01-01 PROCEDURE — 99233 PR SUBSEQUENT HOSPITAL CARE,LEVL III: ICD-10-PCS | Mod: 25,NTX,, | Performed by: PSYCHIATRY & NEUROLOGY

## 2020-01-01 PROCEDURE — 25000003 PHARM REV CODE 250: Mod: NTX | Performed by: INTERNAL MEDICINE

## 2020-01-01 PROCEDURE — 84100 ASSAY OF PHOSPHORUS: CPT | Mod: NTX

## 2020-01-01 PROCEDURE — 85730 THROMBOPLASTIN TIME PARTIAL: CPT | Mod: NTX

## 2020-01-01 PROCEDURE — P9017 PLASMA 1 DONOR FRZ W/IN 8 HR: HCPCS | Mod: NTX

## 2020-01-01 PROCEDURE — 63600175 PHARM REV CODE 636 W HCPCS: Performed by: NURSE PRACTITIONER

## 2020-01-01 PROCEDURE — 99233 PR SUBSEQUENT HOSPITAL CARE,LEVL III: ICD-10-PCS | Mod: NTX,,, | Performed by: PSYCHIATRY & NEUROLOGY

## 2020-01-01 PROCEDURE — 99900035 HC TECH TIME PER 15 MIN (STAT): Mod: NTX

## 2020-01-01 PROCEDURE — 63600175 PHARM REV CODE 636 W HCPCS: Performed by: STUDENT IN AN ORGANIZED HEALTH CARE EDUCATION/TRAINING PROGRAM

## 2020-01-01 PROCEDURE — 99233 PR SUBSEQUENT HOSPITAL CARE,LEVL III: ICD-10-PCS | Mod: NTX,,, | Performed by: INTERNAL MEDICINE

## 2020-01-01 PROCEDURE — 95720 PR EEG, W/VIDEO, CONT RECORD, I&R, >12<26 HRS: ICD-10-PCS | Mod: NTX,,, | Performed by: PSYCHIATRY & NEUROLOGY

## 2020-01-01 PROCEDURE — 99233 SBSQ HOSP IP/OBS HIGH 50: CPT | Mod: ,,, | Performed by: NURSE PRACTITIONER

## 2020-01-01 PROCEDURE — 94761 N-INVAS EAR/PLS OXIMETRY MLT: CPT

## 2020-01-01 PROCEDURE — 99900035 HC TECH TIME PER 15 MIN (STAT)

## 2020-01-01 PROCEDURE — 85610 PROTHROMBIN TIME: CPT | Mod: NTX

## 2020-01-01 PROCEDURE — 83050 HGB METHEMOGLOBIN QUAN: CPT | Mod: NTX

## 2020-01-01 PROCEDURE — 80202 ASSAY OF VANCOMYCIN: CPT | Mod: NTX

## 2020-01-01 PROCEDURE — 99900026 HC AIRWAY MAINTENANCE (STAT): Mod: NTX

## 2020-01-01 PROCEDURE — 25000003 PHARM REV CODE 250: Mod: NTX

## 2020-01-01 PROCEDURE — 90945 DIALYSIS ONE EVALUATION: CPT | Mod: NTX

## 2020-01-01 PROCEDURE — 80069 RENAL FUNCTION PANEL: CPT | Mod: NTX

## 2020-01-01 PROCEDURE — 93308 TTE F-UP OR LMTD: CPT | Mod: NTX

## 2020-01-01 PROCEDURE — 63600175 PHARM REV CODE 636 W HCPCS: Mod: NTX | Performed by: NURSE PRACTITIONER

## 2020-01-01 PROCEDURE — 87633 RESP VIRUS 12-25 TARGETS: CPT | Mod: NTX

## 2020-01-01 PROCEDURE — 87106 FUNGI IDENTIFICATION YEAST: CPT | Mod: NTX

## 2020-01-01 PROCEDURE — 36415 COLL VENOUS BLD VENIPUNCTURE: CPT | Mod: NTX

## 2020-01-01 PROCEDURE — 85730 THROMBOPLASTIN TIME PARTIAL: CPT

## 2020-01-01 PROCEDURE — 82728 ASSAY OF FERRITIN: CPT

## 2020-01-01 PROCEDURE — 99233 SBSQ HOSP IP/OBS HIGH 50: CPT | Mod: NTX,,, | Performed by: INTERNAL MEDICINE

## 2020-01-01 PROCEDURE — 97802 MEDICAL NUTRITION INDIV IN: CPT

## 2020-01-01 PROCEDURE — 27000221 HC OXYGEN, UP TO 24 HOURS

## 2020-01-01 PROCEDURE — 85240 CLOT FACTOR VIII AHG 1 STAGE: CPT

## 2020-01-01 PROCEDURE — 94002 VENT MGMT INPAT INIT DAY: CPT

## 2020-01-01 PROCEDURE — 27200188 HC TRANSDUCER, ART ADULT/PEDS: Mod: NTX

## 2020-01-01 PROCEDURE — 94761 N-INVAS EAR/PLS OXIMETRY MLT: CPT | Mod: NTX

## 2020-01-01 PROCEDURE — 99900026 HC AIRWAY MAINTENANCE (STAT)

## 2020-01-01 PROCEDURE — 82570 ASSAY OF URINE CREATININE: CPT | Mod: NTX

## 2020-01-01 PROCEDURE — 80069 RENAL FUNCTION PANEL: CPT | Mod: 91,NTX

## 2020-01-01 PROCEDURE — 85384 FIBRINOGEN ACTIVITY: CPT

## 2020-01-01 PROCEDURE — 87070 CULTURE OTHR SPECIMN AEROBIC: CPT

## 2020-01-01 PROCEDURE — 99292 CRITICAL CARE ADDL 30 MIN: CPT | Mod: NTX,,, | Performed by: INTERNAL MEDICINE

## 2020-01-01 PROCEDURE — 63600175 PHARM REV CODE 636 W HCPCS: Mod: NTX | Performed by: STUDENT IN AN ORGANIZED HEALTH CARE EDUCATION/TRAINING PROGRAM

## 2020-01-01 PROCEDURE — 20000000 HC ICU ROOM: Mod: NTX

## 2020-01-01 PROCEDURE — 62270 DX LMBR SPI PNXR: CPT | Mod: NTX,,, | Performed by: PSYCHIATRY & NEUROLOGY

## 2020-01-01 PROCEDURE — 87040 BLOOD CULTURE FOR BACTERIA: CPT | Mod: NTX

## 2020-01-01 PROCEDURE — 84300 ASSAY OF URINE SODIUM: CPT | Mod: NTX

## 2020-01-01 PROCEDURE — 36415 COLL VENOUS BLD VENIPUNCTURE: CPT

## 2020-01-01 PROCEDURE — 25000003 PHARM REV CODE 250: Performed by: NURSE PRACTITIONER

## 2020-01-01 PROCEDURE — 95718 PR EEG, W/VIDEO, CONT RECORD, I&R, 2-12 HRS: ICD-10-PCS | Mod: NTX,,, | Performed by: PSYCHIATRY & NEUROLOGY

## 2020-01-01 PROCEDURE — 84100 ASSAY OF PHOSPHORUS: CPT

## 2020-01-01 PROCEDURE — 36556 INSERT NON-TUNNEL CV CATH: CPT | Mod: NTX

## 2020-01-01 PROCEDURE — 86769 SARS-COV-2 COVID-19 ANTIBODY: CPT

## 2020-01-01 PROCEDURE — 95700 EEG CONT REC W/VID EEG TECH: CPT | Mod: NTX

## 2020-01-01 PROCEDURE — 99291 CRITICAL CARE FIRST HOUR: CPT | Mod: ,,, | Performed by: NURSE PRACTITIONER

## 2020-01-01 PROCEDURE — 25000003 PHARM REV CODE 250: Mod: NTX | Performed by: NURSE PRACTITIONER

## 2020-01-01 PROCEDURE — 87205 SMEAR GRAM STAIN: CPT | Mod: NTX

## 2020-01-01 PROCEDURE — 83690 ASSAY OF LIPASE: CPT

## 2020-01-01 PROCEDURE — U0002 COVID-19 LAB TEST NON-CDC: HCPCS | Mod: NTX

## 2020-01-01 PROCEDURE — 63700000 PHARM REV CODE 250 ALT 637 W/O HCPCS: Mod: NTX | Performed by: INTERNAL MEDICINE

## 2020-01-01 PROCEDURE — 37799 UNLISTED PX VASCULAR SURGERY: CPT | Mod: NTX

## 2020-01-01 PROCEDURE — 85025 COMPLETE CBC W/AUTO DIFF WBC: CPT | Mod: NTX

## 2020-01-01 PROCEDURE — 63600175 PHARM REV CODE 636 W HCPCS: Mod: NTX

## 2020-01-01 PROCEDURE — 83735 ASSAY OF MAGNESIUM: CPT

## 2020-01-01 PROCEDURE — 93010 EKG 12-LEAD: ICD-10-PCS | Mod: ,,, | Performed by: INTERNAL MEDICINE

## 2020-01-01 PROCEDURE — C1751 CATH, INF, PER/CENT/MIDLINE: HCPCS | Mod: NTX

## 2020-01-01 PROCEDURE — 43752 NASAL/OROGASTRIC W/TUBE PLMT: CPT | Mod: NTX

## 2020-01-01 PROCEDURE — 93010 ELECTROCARDIOGRAM REPORT: CPT | Mod: ,,, | Performed by: INTERNAL MEDICINE

## 2020-01-01 PROCEDURE — 95720 EEG PHY/QHP EA INCR W/VEEG: CPT | Mod: NTX,,, | Performed by: PSYCHIATRY & NEUROLOGY

## 2020-01-01 PROCEDURE — 99292 PR CRITICAL CARE, ADDL 30 MIN: ICD-10-PCS | Mod: NTX,,, | Performed by: INTERNAL MEDICINE

## 2020-01-01 PROCEDURE — 36620 ARTERIAL: ICD-10-PCS | Mod: NTX,,, | Performed by: ANESTHESIOLOGY

## 2020-01-01 PROCEDURE — 83605 ASSAY OF LACTIC ACID: CPT | Mod: 91,NTX

## 2020-01-01 PROCEDURE — 25000003 PHARM REV CODE 250: Performed by: STUDENT IN AN ORGANIZED HEALTH CARE EDUCATION/TRAINING PROGRAM

## 2020-01-01 PROCEDURE — 81001 URINALYSIS AUTO W/SCOPE: CPT | Mod: NTX

## 2020-01-01 PROCEDURE — 84439 ASSAY OF FREE THYROXINE: CPT | Mod: NTX

## 2020-01-01 PROCEDURE — 83605 ASSAY OF LACTIC ACID: CPT

## 2020-01-01 PROCEDURE — 86850 RBC ANTIBODY SCREEN: CPT

## 2020-01-01 PROCEDURE — 85610 PROTHROMBIN TIME: CPT

## 2020-01-01 PROCEDURE — 80048 BASIC METABOLIC PNL TOTAL CA: CPT | Mod: NTX

## 2020-01-01 PROCEDURE — 36620 INSERTION CATHETER ARTERY: CPT | Mod: NTX

## 2020-01-01 PROCEDURE — 99291 PR CRITICAL CARE, E/M 30-74 MINUTES: ICD-10-PCS | Mod: ,,, | Performed by: NURSE PRACTITIONER

## 2020-01-01 PROCEDURE — 37799 UNLISTED PX VASCULAR SURGERY: CPT

## 2020-01-01 PROCEDURE — 36620 INSERTION CATHETER ARTERY: CPT | Mod: NTX | Performed by: ANESTHESIOLOGY

## 2020-01-01 PROCEDURE — 62270 LUMBAR PUNCTURE: ICD-10-PCS | Mod: NTX,,, | Performed by: PSYCHIATRY & NEUROLOGY

## 2020-01-01 PROCEDURE — 20000000 HC ICU ROOM

## 2020-01-01 PROCEDURE — 93005 ELECTROCARDIOGRAM TRACING: CPT

## 2020-01-01 RX ORDER — FAMOTIDINE 10 MG/ML
20 INJECTION INTRAVENOUS 2 TIMES DAILY
Status: DISCONTINUED | OUTPATIENT
Start: 2020-01-01 | End: 2020-01-01

## 2020-01-01 RX ORDER — MAGNESIUM SULFATE HEPTAHYDRATE 40 MG/ML
2 INJECTION, SOLUTION INTRAVENOUS
Status: DISCONTINUED | OUTPATIENT
Start: 2020-01-01 | End: 2020-01-01

## 2020-01-01 RX ORDER — LACTULOSE 10 G/15ML
20 SOLUTION ORAL 3 TIMES DAILY
Status: DISCONTINUED | OUTPATIENT
Start: 2020-01-01 | End: 2020-01-01

## 2020-01-01 RX ORDER — MAGNESIUM SULFATE HEPTAHYDRATE 40 MG/ML
2 INJECTION, SOLUTION INTRAVENOUS
Status: DISCONTINUED | OUTPATIENT
Start: 2020-01-01 | End: 2020-01-01 | Stop reason: HOSPADM

## 2020-01-01 RX ORDER — NOREPINEPHRINE BITARTRATE 1 MG/ML
INJECTION, SOLUTION INTRAVENOUS
Status: DISCONTINUED
Start: 2020-01-01 | End: 2020-01-01 | Stop reason: HOSPADM

## 2020-01-01 RX ORDER — ISOSORBIDE DINITRATE 10 MG/1
10 TABLET ORAL 3 TIMES DAILY
Status: DISCONTINUED | OUTPATIENT
Start: 2020-01-01 | End: 2020-01-01

## 2020-01-01 RX ORDER — FUROSEMIDE 10 MG/ML
80 INJECTION INTRAMUSCULAR; INTRAVENOUS ONCE
Status: COMPLETED | OUTPATIENT
Start: 2020-01-01 | End: 2020-01-01

## 2020-01-01 RX ORDER — DOBUTAMINE HYDROCHLORIDE 400 MG/100ML
5 INJECTION, SOLUTION INTRAVENOUS CONTINUOUS
Status: DISCONTINUED | OUTPATIENT
Start: 2020-01-01 | End: 2020-01-01 | Stop reason: HOSPADM

## 2020-01-01 RX ORDER — ONDANSETRON 2 MG/ML
INJECTION INTRAMUSCULAR; INTRAVENOUS
Status: COMPLETED
Start: 2020-01-01 | End: 2020-01-01

## 2020-01-01 RX ORDER — PHENYLEPHRINE HCL IN 0.9% NACL 1 MG/10 ML
SYRINGE (ML) INTRAVENOUS
Status: COMPLETED
Start: 2020-01-01 | End: 2020-01-01

## 2020-01-01 RX ORDER — HEPARIN SODIUM 5000 [USP'U]/ML
5000 INJECTION, SOLUTION INTRAVENOUS; SUBCUTANEOUS EVERY 8 HOURS
Status: DISCONTINUED | OUTPATIENT
Start: 2020-01-01 | End: 2020-01-01 | Stop reason: HOSPADM

## 2020-01-01 RX ORDER — LIDOCAINE HYDROCHLORIDE 10 MG/ML
INJECTION INFILTRATION; PERINEURAL
Status: COMPLETED
Start: 2020-01-01 | End: 2020-01-01

## 2020-01-01 RX ORDER — ISOSORBIDE DINITRATE 10 MG/1
10 TABLET ORAL 3 TIMES DAILY PRN
Status: DISCONTINUED | OUTPATIENT
Start: 2020-01-01 | End: 2020-01-01

## 2020-01-01 RX ORDER — POTASSIUM CHLORIDE 29.8 MG/ML
40 INJECTION INTRAVENOUS ONCE
Status: COMPLETED | OUTPATIENT
Start: 2020-01-01 | End: 2020-01-01

## 2020-01-01 RX ORDER — HYDROCODONE BITARTRATE AND ACETAMINOPHEN 500; 5 MG/1; MG/1
TABLET ORAL
Status: DISCONTINUED | OUTPATIENT
Start: 2020-01-01 | End: 2020-01-01 | Stop reason: HOSPADM

## 2020-01-01 RX ORDER — POTASSIUM CHLORIDE 14.9 MG/ML
20 INJECTION INTRAVENOUS ONCE
Status: COMPLETED | OUTPATIENT
Start: 2020-01-01 | End: 2020-01-01

## 2020-01-01 RX ORDER — POLYETHYLENE GLYCOL 3350 17 G/17G
17 POWDER, FOR SOLUTION ORAL 2 TIMES DAILY
Status: DISCONTINUED | OUTPATIENT
Start: 2020-01-01 | End: 2020-01-01 | Stop reason: HOSPADM

## 2020-01-01 RX ORDER — FAMOTIDINE 10 MG/ML
20 INJECTION INTRAVENOUS NIGHTLY
Status: DISCONTINUED | OUTPATIENT
Start: 2020-01-01 | End: 2020-01-01 | Stop reason: HOSPADM

## 2020-01-01 RX ORDER — HYDRALAZINE HYDROCHLORIDE 10 MG/1
10 TABLET, FILM COATED ORAL EVERY 8 HOURS
Status: DISCONTINUED | OUTPATIENT
Start: 2020-01-01 | End: 2020-01-01

## 2020-01-01 RX ORDER — HYDROCODONE BITARTRATE AND ACETAMINOPHEN 500; 5 MG/1; MG/1
TABLET ORAL CONTINUOUS
Status: DISCONTINUED | OUTPATIENT
Start: 2020-01-01 | End: 2020-01-01

## 2020-01-01 RX ORDER — GLUCAGON 1 MG
1 KIT INJECTION
Status: DISCONTINUED | OUTPATIENT
Start: 2020-01-01 | End: 2020-01-01 | Stop reason: HOSPADM

## 2020-01-01 RX ORDER — LIDOCAINE HYDROCHLORIDE 10 MG/ML
1 INJECTION INFILTRATION; PERINEURAL ONCE
Status: COMPLETED | OUTPATIENT
Start: 2020-01-01 | End: 2020-01-01

## 2020-01-01 RX ORDER — LIDOCAINE HYDROCHLORIDE 10 MG/ML
INJECTION INFILTRATION; PERINEURAL
Status: DISPENSED
Start: 2020-01-01 | End: 2020-01-01

## 2020-01-01 RX ORDER — DEXMEDETOMIDINE HYDROCHLORIDE 4 UG/ML
0.2 INJECTION, SOLUTION INTRAVENOUS CONTINUOUS
Status: DISCONTINUED | OUTPATIENT
Start: 2020-01-01 | End: 2020-01-01 | Stop reason: HOSPADM

## 2020-01-01 RX ORDER — LACTULOSE 10 G/15ML
20 SOLUTION ORAL 3 TIMES DAILY
Status: DISCONTINUED | OUTPATIENT
Start: 2020-01-01 | End: 2020-01-01 | Stop reason: HOSPADM

## 2020-01-01 RX ORDER — BISACODYL 10 MG
10 SUPPOSITORY, RECTAL RECTAL DAILY PRN
Status: DISCONTINUED | OUTPATIENT
Start: 2020-01-01 | End: 2020-01-01 | Stop reason: HOSPADM

## 2020-01-01 RX ORDER — ONDANSETRON 2 MG/ML
8 INJECTION INTRAMUSCULAR; INTRAVENOUS ONCE AS NEEDED
Status: COMPLETED | OUTPATIENT
Start: 2020-01-01 | End: 2020-01-01

## 2020-01-01 RX ORDER — AMOXICILLIN 250 MG
2 CAPSULE ORAL 2 TIMES DAILY
Status: DISCONTINUED | OUTPATIENT
Start: 2020-01-01 | End: 2020-01-01 | Stop reason: HOSPADM

## 2020-01-01 RX ORDER — FENTANYL CITRATE-0.9 % NACL/PF 10 MCG/ML
12.5 PLASTIC BAG, INJECTION (ML) INTRAVENOUS CONTINUOUS
Status: DISCONTINUED | OUTPATIENT
Start: 2020-01-01 | End: 2020-01-01

## 2020-01-01 RX ORDER — INSULIN ASPART 100 [IU]/ML
0-5 INJECTION, SOLUTION INTRAVENOUS; SUBCUTANEOUS EVERY 6 HOURS PRN
Status: DISCONTINUED | OUTPATIENT
Start: 2020-01-01 | End: 2020-01-01 | Stop reason: HOSPADM

## 2020-01-01 RX ORDER — NOREPINEPHRINE BITARTRATE/D5W 4MG/250ML
0.2 PLASTIC BAG, INJECTION (ML) INTRAVENOUS CONTINUOUS
Status: DISCONTINUED | OUTPATIENT
Start: 2020-01-01 | End: 2020-01-01

## 2020-01-01 RX ORDER — MILRINONE LACTATE 0.2 MG/ML
INJECTION, SOLUTION INTRAVENOUS
Status: DISPENSED
Start: 2020-01-01 | End: 2020-01-01

## 2020-01-01 RX ORDER — MILRINONE LACTATE 0.2 MG/ML
0.38 INJECTION, SOLUTION INTRAVENOUS CONTINUOUS
Status: DISCONTINUED | OUTPATIENT
Start: 2020-01-01 | End: 2020-01-01

## 2020-01-01 RX ORDER — ACETAMINOPHEN 650 MG/20.3ML
500 LIQUID ORAL EVERY 6 HOURS PRN
Status: DISCONTINUED | OUTPATIENT
Start: 2020-01-01 | End: 2020-01-01 | Stop reason: HOSPADM

## 2020-01-01 RX ORDER — CEFEPIME HYDROCHLORIDE 2 G/1
2 INJECTION, POWDER, FOR SOLUTION INTRAVENOUS
Status: DISCONTINUED | OUTPATIENT
Start: 2020-01-01 | End: 2020-01-01

## 2020-01-01 RX ORDER — EPINEPHRINE 0.1 MG/ML
INJECTION INTRAVENOUS
Status: DISPENSED
Start: 2020-01-01 | End: 2020-01-01

## 2020-01-01 RX ORDER — CEFEPIME HYDROCHLORIDE 1 G/1
1 INJECTION, POWDER, FOR SOLUTION INTRAMUSCULAR; INTRAVENOUS
Status: DISCONTINUED | OUTPATIENT
Start: 2020-01-01 | End: 2020-01-01

## 2020-01-01 RX ORDER — DEXTROSE 50 % IN WATER (D50W) INTRAVENOUS SYRINGE
12.5
Status: DISCONTINUED | OUTPATIENT
Start: 2020-01-01 | End: 2020-01-01 | Stop reason: HOSPADM

## 2020-01-01 RX ORDER — VANCOMYCIN HCL IN 5 % DEXTROSE 1G/250ML
1000 PLASTIC BAG, INJECTION (ML) INTRAVENOUS
Status: DISCONTINUED | OUTPATIENT
Start: 2020-01-01 | End: 2020-01-01

## 2020-01-01 RX ORDER — SODIUM CHLORIDE 0.9 % (FLUSH) 0.9 %
10 SYRINGE (ML) INJECTION
Status: DISCONTINUED | OUTPATIENT
Start: 2020-01-01 | End: 2020-01-01 | Stop reason: HOSPADM

## 2020-01-01 RX ORDER — POTASSIUM CHLORIDE 20 MEQ/15ML
40 SOLUTION ORAL ONCE
Status: COMPLETED | OUTPATIENT
Start: 2020-01-01 | End: 2020-01-01

## 2020-01-01 RX ORDER — CHLORHEXIDINE GLUCONATE ORAL RINSE 1.2 MG/ML
15 SOLUTION DENTAL 2 TIMES DAILY
Status: DISCONTINUED | OUTPATIENT
Start: 2020-01-01 | End: 2020-01-01 | Stop reason: HOSPADM

## 2020-01-01 RX ORDER — DIPHENHYDRAMINE HCL 50 MG
50 CAPSULE ORAL ONCE
Status: CANCELLED | OUTPATIENT
Start: 2020-01-01 | End: 2020-01-01

## 2020-01-01 RX ORDER — DEXMEDETOMIDINE HYDROCHLORIDE 4 UG/ML
INJECTION, SOLUTION INTRAVENOUS
Status: DISPENSED
Start: 2020-01-01 | End: 2020-01-01

## 2020-01-01 RX ORDER — LACTULOSE 10 G/15ML
20 SOLUTION ORAL 3 TIMES DAILY PRN
Status: DISCONTINUED | OUTPATIENT
Start: 2020-01-01 | End: 2020-01-01

## 2020-01-01 RX ORDER — VASOPRESSIN 20 [USP'U]/ML
INJECTION, SOLUTION INTRAMUSCULAR; SUBCUTANEOUS
Status: DISCONTINUED
Start: 2020-01-01 | End: 2020-01-01 | Stop reason: HOSPADM

## 2020-01-01 RX ADMIN — ACETAMINOPHEN 499.51 MG: 160 SOLUTION ORAL at 04:06

## 2020-01-01 RX ADMIN — POLYETHYLENE GLYCOL 3350 17 G: 17 POWDER, FOR SOLUTION ORAL at 09:06

## 2020-01-01 RX ADMIN — LEVETIRACETAM 750 MG: 100 INJECTION, SOLUTION, CONCENTRATE INTRAVENOUS at 08:06

## 2020-01-01 RX ADMIN — FAMOTIDINE 20 MG: 10 INJECTION INTRAVENOUS at 10:06

## 2020-01-01 RX ADMIN — SENNOSIDES AND DOCUSATE SODIUM 2 TABLET: 8.6; 5 TABLET ORAL at 11:06

## 2020-01-01 RX ADMIN — CEFEPIME 1 G: 1 INJECTION, POWDER, FOR SOLUTION INTRAMUSCULAR; INTRAVENOUS at 06:06

## 2020-01-01 RX ADMIN — HYDRALAZINE HYDROCHLORIDE 10 MG: 10 TABLET, FILM COATED ORAL at 03:06

## 2020-01-01 RX ADMIN — ONDANSETRON 8 MG: 2 INJECTION INTRAMUSCULAR; INTRAVENOUS at 08:06

## 2020-01-01 RX ADMIN — FUROSEMIDE 40 MG/HR: 10 INJECTION, SOLUTION INTRAMUSCULAR; INTRAVENOUS at 03:06

## 2020-01-01 RX ADMIN — VASOPRESSIN 0.04 UNITS/MIN: 20 INJECTION INTRAVENOUS at 06:06

## 2020-01-01 RX ADMIN — ISOSORBIDE DINITRATE 10 MG: 10 TABLET ORAL at 08:06

## 2020-01-01 RX ADMIN — ISOSORBIDE DINITRATE 10 MG: 10 TABLET ORAL at 03:06

## 2020-01-01 RX ADMIN — BISACODYL 10 MG: 10 SUPPOSITORY RECTAL at 04:06

## 2020-01-01 RX ADMIN — ISOSORBIDE DINITRATE 10 MG: 10 TABLET ORAL at 09:06

## 2020-01-01 RX ADMIN — SODIUM NITROPRUSSIDE 0.3 MCG/KG/MIN: 25 INJECTION, SOLUTION, CONCENTRATE INTRAVENOUS at 10:06

## 2020-01-01 RX ADMIN — INSULIN ASPART 2 UNITS: 100 INJECTION, SOLUTION INTRAVENOUS; SUBCUTANEOUS at 09:06

## 2020-01-01 RX ADMIN — SENNOSIDES AND DOCUSATE SODIUM 2 TABLET: 8.6; 5 TABLET ORAL at 08:06

## 2020-01-01 RX ADMIN — CHLOROTHIAZIDE SODIUM 500 MG: 500 INJECTION, POWDER, LYOPHILIZED, FOR SOLUTION INTRAVENOUS at 08:06

## 2020-01-01 RX ADMIN — CEFTRIAXONE 2 G: 2 INJECTION, SOLUTION INTRAVENOUS at 09:06

## 2020-01-01 RX ADMIN — SENNOSIDES AND DOCUSATE SODIUM 2 TABLET: 8.6; 5 TABLET ORAL at 10:06

## 2020-01-01 RX ADMIN — LACTULOSE 20 G: 20 SOLUTION ORAL at 11:06

## 2020-01-01 RX ADMIN — LEVETIRACETAM 750 MG: 100 INJECTION, SOLUTION, CONCENTRATE INTRAVENOUS at 11:06

## 2020-01-01 RX ADMIN — POTASSIUM CHLORIDE 40 MEQ: 400 INJECTION, SOLUTION INTRAVENOUS at 12:06

## 2020-01-01 RX ADMIN — HYDROCORTISONE SODIUM SUCCINATE 100 MG: 100 INJECTION, POWDER, FOR SOLUTION INTRAMUSCULAR; INTRAVENOUS at 10:06

## 2020-01-01 RX ADMIN — LEVETIRACETAM 750 MG: 100 INJECTION, SOLUTION, CONCENTRATE INTRAVENOUS at 10:06

## 2020-01-01 RX ADMIN — CHLORHEXIDINE GLUCONATE 0.12% ORAL RINSE 15 ML: 1.2 LIQUID ORAL at 08:06

## 2020-01-01 RX ADMIN — CEFTRIAXONE 2 G: 2 INJECTION, SOLUTION INTRAVENOUS at 11:06

## 2020-01-01 RX ADMIN — CHLOROTHIAZIDE SODIUM 250 MG: 500 INJECTION, POWDER, LYOPHILIZED, FOR SOLUTION INTRAVENOUS at 10:06

## 2020-01-01 RX ADMIN — LIDOCAINE HYDROCHLORIDE 1 ML: 10 INJECTION, SOLUTION INFILTRATION; PERINEURAL at 11:06

## 2020-01-01 RX ADMIN — FUROSEMIDE 40 MG/HR: 10 INJECTION, SOLUTION INTRAMUSCULAR; INTRAVENOUS at 01:06

## 2020-01-01 RX ADMIN — LIDOCAINE HYDROCHLORIDE 10 MG: 10 INJECTION, SOLUTION INFILTRATION; PERINEURAL at 10:06

## 2020-01-01 RX ADMIN — FUROSEMIDE 40 MG/HR: 10 INJECTION, SOLUTION INTRAMUSCULAR; INTRAVENOUS at 06:06

## 2020-01-01 RX ADMIN — CHLOROTHIAZIDE SODIUM 500 MG: 500 INJECTION, POWDER, LYOPHILIZED, FOR SOLUTION INTRAVENOUS at 01:06

## 2020-01-01 RX ADMIN — DOBUTAMINE HYDROCHLORIDE 5 MCG/KG/MIN: 200 INJECTION INTRAVENOUS at 02:06

## 2020-01-01 RX ADMIN — HEPARIN SODIUM 5000 UNITS: 5000 INJECTION INTRAVENOUS; SUBCUTANEOUS at 06:06

## 2020-01-01 RX ADMIN — EPINEPHRINE 0.02 MCG/KG/MIN: 1 INJECTION INTRAMUSCULAR; INTRAVENOUS; SUBCUTANEOUS at 10:06

## 2020-01-01 RX ADMIN — HEPARIN SODIUM 5000 UNITS: 5000 INJECTION INTRAVENOUS; SUBCUTANEOUS at 12:06

## 2020-01-01 RX ADMIN — POTASSIUM CHLORIDE 20 MEQ: 14.9 INJECTION, SOLUTION INTRAVENOUS at 01:06

## 2020-01-01 RX ADMIN — Medication 1.5 G: at 06:06

## 2020-01-01 RX ADMIN — LACTULOSE 20 G: 20 SOLUTION ORAL at 03:06

## 2020-01-01 RX ADMIN — FUROSEMIDE 30 MG/HR: 10 INJECTION, SOLUTION INTRAMUSCULAR; INTRAVENOUS at 11:06

## 2020-01-01 RX ADMIN — CHLOROTHIAZIDE SODIUM 500 MG: 500 INJECTION, POWDER, LYOPHILIZED, FOR SOLUTION INTRAVENOUS at 03:06

## 2020-01-01 RX ADMIN — DOBUTAMINE HYDROCHLORIDE 2.5 MCG/KG/MIN: 200 INJECTION INTRAVENOUS at 06:06

## 2020-01-01 RX ADMIN — Medication 75 MCG/HR: at 09:06

## 2020-01-01 RX ADMIN — DEXMEDETOMIDINE HYDROCHLORIDE 0.6 MCG/KG/HR: 4 INJECTION, SOLUTION INTRAVENOUS at 03:06

## 2020-01-01 RX ADMIN — FUROSEMIDE 40 MG/HR: 10 INJECTION, SOLUTION INTRAMUSCULAR; INTRAVENOUS at 02:06

## 2020-01-01 RX ADMIN — CHLOROTHIAZIDE SODIUM 500 MG: 500 INJECTION, POWDER, LYOPHILIZED, FOR SOLUTION INTRAVENOUS at 12:06

## 2020-01-01 RX ADMIN — ACETAMINOPHEN 499.51 MG: 160 SOLUTION ORAL at 08:06

## 2020-01-01 RX ADMIN — CEFTRIAXONE 2 G: 2 INJECTION, SOLUTION INTRAVENOUS at 07:06

## 2020-01-01 RX ADMIN — FUROSEMIDE 80 MG: 10 INJECTION, SOLUTION INTRAMUSCULAR; INTRAVENOUS at 06:06

## 2020-01-01 RX ADMIN — ACETAMINOPHEN 499.51 MG: 160 SOLUTION ORAL at 11:06

## 2020-01-01 RX ADMIN — HUMAN ALBUMIN MICROSPHERES AND PERFLUTREN 0.66 MG: 10; .22 INJECTION, SOLUTION INTRAVENOUS at 10:06

## 2020-01-01 RX ADMIN — NOREPINEPHRINE BITARTRATE 0.02 MCG/KG/MIN: 1 INJECTION, SOLUTION, CONCENTRATE INTRAVENOUS at 07:06

## 2020-01-01 RX ADMIN — ISOSORBIDE DINITRATE 10 MG: 10 TABLET ORAL at 10:06

## 2020-01-01 RX ADMIN — CEFTRIAXONE 2 G: 2 INJECTION, SOLUTION INTRAVENOUS at 08:06

## 2020-01-01 RX ADMIN — LEVETIRACETAM 750 MG: 100 INJECTION, SOLUTION, CONCENTRATE INTRAVENOUS at 09:06

## 2020-01-01 RX ADMIN — FUROSEMIDE 20 MG/HR: 10 INJECTION, SOLUTION INTRAMUSCULAR; INTRAVENOUS at 12:06

## 2020-01-01 RX ADMIN — CHLORHEXIDINE GLUCONATE 0.12% ORAL RINSE 15 ML: 1.2 LIQUID ORAL at 10:06

## 2020-01-01 RX ADMIN — DEXMEDETOMIDINE HYDROCHLORIDE 0.6 MCG/KG/HR: 4 INJECTION, SOLUTION INTRAVENOUS at 09:06

## 2020-01-01 RX ADMIN — HYDRALAZINE HYDROCHLORIDE 10 MG: 10 TABLET, FILM COATED ORAL at 10:06

## 2020-01-01 RX ADMIN — MINERAL OIL AND WHITE PETROLATUM: 150; 830 OINTMENT OPHTHALMIC at 10:06

## 2020-01-01 RX ADMIN — MINERAL OIL AND WHITE PETROLATUM: 150; 830 OINTMENT OPHTHALMIC at 11:06

## 2020-01-01 RX ADMIN — FUROSEMIDE 80 MG: 10 INJECTION, SOLUTION INTRAMUSCULAR; INTRAVENOUS at 03:06

## 2020-01-01 RX ADMIN — MEROPENEM 2 G: 1 INJECTION, POWDER, FOR SOLUTION INTRAVENOUS at 06:06

## 2020-01-01 RX ADMIN — INSULIN ASPART 2 UNITS: 100 INJECTION, SOLUTION INTRAVENOUS; SUBCUTANEOUS at 06:06

## 2020-01-01 RX ADMIN — FAMOTIDINE 20 MG: 10 INJECTION INTRAVENOUS at 11:06

## 2020-01-01 RX ADMIN — HYDRALAZINE HYDROCHLORIDE 10 MG: 10 TABLET, FILM COATED ORAL at 05:06

## 2020-01-01 RX ADMIN — SODIUM CHLORIDE: 0.9 INJECTION, SOLUTION INTRAVENOUS at 11:06

## 2020-01-01 RX ADMIN — DEXMEDETOMIDINE HYDROCHLORIDE 0.2 MCG/KG/HR: 4 INJECTION, SOLUTION INTRAVENOUS at 09:06

## 2020-01-01 RX ADMIN — POTASSIUM CHLORIDE 40 MEQ: 20 SOLUTION ORAL at 01:06

## 2020-01-01 RX ADMIN — DOBUTAMINE HYDROCHLORIDE 5 MCG/KG/MIN: 200 INJECTION INTRAVENOUS at 04:06

## 2020-01-01 RX ADMIN — POLYETHYLENE GLYCOL 3350 17 G: 17 POWDER, FOR SOLUTION ORAL at 11:06

## 2020-01-01 RX ADMIN — MILRINONE LACTATE 0.5 MCG/KG/MIN: 200 INJECTION, SOLUTION INTRAVENOUS at 10:06

## 2020-01-01 RX ADMIN — FUROSEMIDE 80 MG: 10 INJECTION, SOLUTION INTRAMUSCULAR; INTRAVENOUS at 12:06

## 2020-01-01 RX ADMIN — INSULIN ASPART 1 UNITS: 100 INJECTION, SOLUTION INTRAVENOUS; SUBCUTANEOUS at 11:06

## 2020-01-01 RX ADMIN — HYDRALAZINE HYDROCHLORIDE 10 MG: 10 TABLET, FILM COATED ORAL at 09:06

## 2020-01-01 RX ADMIN — DOBUTAMINE HYDROCHLORIDE 5 MCG/KG/MIN: 200 INJECTION INTRAVENOUS at 08:06

## 2020-01-01 RX ADMIN — EPINEPHRINE 0.04 MCG/KG/MIN: 1 INJECTION INTRAMUSCULAR; INTRAVENOUS; SUBCUTANEOUS at 08:06

## 2020-01-01 RX ADMIN — ANGIOTENSIN II 10 NG/KG/MIN: 2.5 INJECTION INTRAVENOUS at 11:06

## 2020-01-01 RX ADMIN — FUROSEMIDE 40 MG/HR: 10 INJECTION, SOLUTION INTRAMUSCULAR; INTRAVENOUS at 08:06

## 2020-01-01 RX ADMIN — EPINEPHRINE 0.04 MCG/KG/MIN: 1 INJECTION INTRAMUSCULAR; INTRAVENOUS; SUBCUTANEOUS at 09:06

## 2020-01-01 RX ADMIN — PHYTONADIONE 2.5 MG: 10 INJECTION, EMULSION INTRAMUSCULAR; INTRAVENOUS; SUBCUTANEOUS at 04:06

## 2020-01-01 RX ADMIN — CHLOROTHIAZIDE SODIUM 500 MG: 500 INJECTION, POWDER, LYOPHILIZED, FOR SOLUTION INTRAVENOUS at 02:06

## 2020-04-28 NOTE — TELEPHONE ENCOUNTER
Pharmacy cannot get 750mg tablets of Keppra. They can get 250mg tablets. Asked them to change the prescription from (1) 750 mg tab BID to (3) 250mg tabs BID. I phoned Mom to make here aware of the change. Left a voicemail. I will also send a message vis [facility].    ----- Message from Magaly Allan sent at 4/28/2020 12:21 PM CDT -----  Contact: Francheska raya [facility] [phone]  She is needing to get another medication called in for the pt's Keppra because it is on backorder. Please call her back to discuss options.

## 2020-06-23 PROBLEM — R57.0 CARDIOGENIC SHOCK: Status: ACTIVE | Noted: 2020-01-01

## 2020-06-23 PROBLEM — I50.9 DECOMPENSATED HEART FAILURE: Status: ACTIVE | Noted: 2020-01-01

## 2020-06-23 PROBLEM — J96.00 ACUTE RESPIRATORY FAILURE: Status: ACTIVE | Noted: 2020-01-01

## 2020-06-23 PROBLEM — G40.909 SEIZURE DISORDER: Status: ACTIVE | Noted: 2020-01-01

## 2020-06-23 PROBLEM — F90.9 ADHD: Status: ACTIVE | Noted: 2020-01-01

## 2020-06-23 PROBLEM — D68.9 COAGULATION DEFECT, UNSPECIFIED: Status: ACTIVE | Noted: 2020-01-01

## 2020-06-23 PROBLEM — I31.39 PERICARDIAL EFFUSION: Status: ACTIVE | Noted: 2020-01-01

## 2020-06-23 PROBLEM — D72.829 LEUKOCYTOSIS: Status: ACTIVE | Noted: 2020-01-01

## 2020-06-24 PROBLEM — D72.829 LEUKOCYTOSIS: Status: RESOLVED | Noted: 2020-01-01 | Resolved: 2020-01-01

## 2020-06-24 PROBLEM — R50.9 FEVER: Status: ACTIVE | Noted: 2020-01-01

## 2020-06-24 PROBLEM — I31.39 PERICARDIAL EFFUSION: Status: RESOLVED | Noted: 2020-01-01 | Resolved: 2020-01-01

## 2020-06-24 PROBLEM — N17.9 AKI (ACUTE KIDNEY INJURY): Status: ACTIVE | Noted: 2020-01-01

## 2020-06-24 NOTE — ASSESSMENT & PLAN NOTE
19 year old female transferred to [facility] for advanced [place] evaluation. New onset decreased EF to 10%, global hypokinesis. Reported persistent fever of 101-102 at OSH on 6/21. Per history no obvious infectious symptoms prior to hospitalization, particularly no URI symptoms. Patient with leukocytosis on admit to [facility], however had been receiving steroids at OSH. Neurology consulted due to AMS and concerning neurologic findings. CT head was negative.    Called [facility] and [facility] for culture results. Blood cultures on admit at OSH are NGTD for 4 days and U/A was negative at OSH. Repeat cultures drawn after patient became febrile at [facility] have also been no growth and U/A was negative (culture was still sent and NGTD). Patient has been on vancomycin and cefepime. No clear source of infection, perhaps some concern of viral myocarditis given acute drop in EF in a young female with no prior cardiac history.    Plan:  - Agree with LP per neurology, will follow up results once obtained  - Would empirically cover for meningitis given concerning neurologic findings. Recommend:   - continuing Vancomycin   - change cefepime to ceftriaxone 2g q12h for CNS penetration  - Would check Respiratory Infection Panel  - Follow up EEG  - Low suspicion for resistant organisms / bacteremia given negative culture results so far and patient has only been admitted for 4 days at this point

## 2020-06-24 NOTE — ASSESSMENT & PLAN NOTE
- Currently sedated on Precedex will try to wean off Fentanyl   - Continue her Keppra at her home dose 750 mg BID

## 2020-06-24 NOTE — EICU
Intervention Initiated From:  Bedside   11:10 Called into room for Time Out prior to lumbar puncture. Dr Buckley at bedside, competencies verified in [employer]. Time Out as charted. /87,  , SP02 96%  11:36 Unsuccessful attempts for LP-MD's will refer to anesthesia.

## 2020-06-24 NOTE — SUBJECTIVE & OBJECTIVE
Past Medical History:   Diagnosis Date    Intellectual disability     Seizures     Strabismus        Past Surgical History:   Procedure Laterality Date    STRABISMUS SURGERY  9/99    BMR recession OU .5MM    STRABISMUS SURGERY  7/2001    BSO tenotomy/LLR recession 4mm    STRABISMUS SURGERY  5/2005    RLR recession 6mm/ IO myectomy OU        Review of patient's allergies indicates:  No Known Allergies    Medications:  Medications Prior to Admission   Medication Sig    amitriptyline (ELAVIL) 75 MG tablet TK 1 T PO QHS    clonidine (CATAPRES) 0.1 MG tablet Take 0.1 mg by mouth 2 (two) times daily.    diazePAM 5-7.5-10 mg (DIASTAT ACUDIAL) 5-7.5-10 mg Kit Place 1 each (10 mg total) rectally as needed (seizure > 5 mins).    INTROVALE 0.15 mg-30 mcg (91) per tablet TAKE 1 TABLET BY MOUTH EVERY DAY    levETIRAcetam (KEPPRA) 750 MG Tab Take 1 tablet (750 mg total) by mouth 2 (two) times daily.    levonorgestrel-ethinyl estradiol (INTROVALE) 0.15 mg-30 mcg (91) per tablet Take 1 tablet by mouth once daily.    VYVANSE 50 mg capsule Take 50 mg by mouth once daily.     Antibiotics (From admission, onward)    None        Antifungals (From admission, onward)    None        Antivirals (From admission, onward)    None             There is no immunization history on file for this patient.    Family History     Problem Relation (Age of Onset)    Amblyopia Paternal Aunt    Cancer Maternal Grandfather    Cataracts Maternal Grandmother    Hypertension Father, Maternal Grandmother, Paternal Grandmother    Ovarian cancer Maternal Aunt    Strabismus Father, Paternal Aunt    Thyroid disease Paternal Grandfather        Social History     Socioeconomic History    Marital status: Single     Spouse name: Not on file    Number of children: Not on file    Years of education: Not on file    Highest education level: Not on file   Occupational History    Not on file   Social Needs    Financial resource strain: Not on file    Food  insecurity     Worry: Not on file     Inability: Not on file    Transportation needs     Medical: Not on file     Non-medical: Not on file   Tobacco Use    Smoking status: Never Smoker    Smokeless tobacco: Never Used   Substance and Sexual Activity    Alcohol use: No    Drug use: No    Sexual activity: Never     Birth control/protection: OCP     Comment: Seasonale   Lifestyle    Physical activity     Days per week: Not on file     Minutes per session: Not on file    Stress: Not on file   Relationships    Social connections     Talks on phone: Not on file     Gets together: Not on file     Attends Catholic service: Not on file     Active member of club or organization: Not on file     Attends meetings of clubs or organizations: Not on file     Relationship status: Not on file   Other Topics Concern    Not on file   Social History Narrative    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    Estimated Creatinine Clearance: 47 mL/min (A) (based on SCr of 2.1 mg/dL (H)).    Physical Exam  Vitals signs and nursing note reviewed.   Constitutional:       Appearance: She is well-developed.      Comments: Intubated and sedated   HENT:      Head: Normocephalic and atraumatic.      Nose: No congestion or rhinorrhea.      Mouth/Throat:      Comments: OG tube, no drainage in suction cannister  Eyes:      General: Scleral icterus present.      Pupils: Pupils are equal, round, and reactive  to light.   Neck:      Musculoskeletal: Normal range of motion and neck supple.      Comments: LIJ central line in place  Cardiovascular:      Rate and Rhythm: Normal rate and regular rhythm.      Heart sounds: Normal heart sounds. No murmur. No friction rub.   Pulmonary:      Effort: Pulmonary effort is normal. No respiratory distress.      Breath sounds: No wheezing or rales.      Comments: ET tube in place, mechanical breath sounds  Abdominal:      General: Bowel sounds are normal. There is no distension.      Palpations: Abdomen is soft.      Tenderness: There is no abdominal tenderness. There is no guarding or rebound.   Musculoskeletal: Normal range of motion.   Lymphadenopathy:      Cervical: No cervical adenopathy.   Skin:     General: Skin is warm and dry.      Coloration: Skin is not pale.      Findings: No erythema.   Neurological:      Mental Status: She is oriented to person, place, and time.      Cranial Nerves: No cranial nerve deficit.         Significant Labs:   CBC:   Recent Labs   Lab 06/23/20 2059 06/24/20  0340   WBC 9.39 9.11   HGB 10.9* 10.8*   HCT 35.1* 36.7*   PLT 98* 94*     CMP:   Recent Labs   Lab 06/23/20  2100 06/24/20  0340     138 138  138   K 3.5  3.5 5.2*  5.2*     103 106  106   CO2 21*  21* 19*  19*   *  173* 155*  155*   BUN 56*  56* 64*  64*   CREATININE 1.8*  1.8* 2.1*  2.1*   CALCIUM 11.1*  11.1* 10.5  10.5   PROT 7.8  7.8 7.5   ALBUMIN 3.9  3.9 3.6   BILITOT 4.1*  4.1* 3.8*   ALKPHOS 38*  38* 35*   AST 64*  64* 60*   *  139* 130*   ANIONGAP 14  14 13  13   EGFRNONAA 39.7*  39.7* 32.9*  32.9*     Microbiology Results (last 7 days)     Procedure Component Value Units Date/Time    CSF culture [[id number]]     Order Status: No result Specimen: CSF (Spinal Fluid) from CSF Tap, Tube 3     Gram stain [[id number]]     Order Status: No result Specimen: CSF (Spinal Fluid) from CSF Tap, Tube 3         All pertinent labs within the past  24 hours have been reviewed.    Significant Imaging: I have reviewed all pertinent imaging results/findings within the past 24 hours.

## 2020-06-24 NOTE — CONSULTS
"  [facility]  Adult Nutrition  Consult Note    SUMMARY     Recommendations    1. If/when medically feasible, initiate enteral nutrition. Rec'd Isosource 1.5 @ 40 mL/hr to provide 1440 kcals, 65 g of protein, 733 mL fluid.  2. If able to extubate & advance diet, recommend Cardiac diet (texture per SLP).   3. RD to monitor & follow-up.    Goals: Meet % EEN, EPN by RD f/u date  Nutrition Goal Status: new  Communication of RD Recs: reviewed with RN    Reason for Assessment    Reason For Assessment: consult  Diagnosis: other (see comments)(Cardiogenic shock)  Relevant Medical History: Intellectual disability  Interdisciplinary Rounds: did not attend    General Information Comments: Pt intubated w/ no family at bedside. Unsure of PO intake PTA, chart review reveals pt's UBW ~ 140#. NFPE not complete 2/2 pt receiving US at bedside. RD unable to assess for malnutrition, will monitor.  Nutrition Discharge Planning: Unable to determine    Nutrition/Diet History    Patient Reported Diet/Restrictions/Preferences: other (see comments)(MEENA)  Factors Affecting Nutritional Intake: NPO, on mechanical ventilation    Anthropometrics    Temp: 98.9 °F (37.2 °C)  Height: 4' 11" (149.9 cm)  Height (inches): 59 in  Weight: 70.7 kg (155 lb 13.8 oz)  Weight (lb): 155.87 lb  Ideal Body Weight (IBW), Female: 95 lb  % Ideal Body Weight, Female (lb): 164.07 %  BMI (Calculated): 31.5  BMI Grade: 30 - 34.9- obesity - grade I  Usual Body Weight (UBW), k.6 kg(Per chart review.)  % Usual Body Weight: 111.4  % Weight Change From Usual Weight: 11.16 %    Lab/Procedures/Meds    Pertinent Labs Reviewed: reviewed  Pertinent Labs Comments: K 5.2, BUN 64, Creat 2.1, GFR 32.9  Pertinent Medications Reviewed: reviewed  Pertinent Medications Comments: Dobutamine, Epi, Lasix    Estimated/Assessed Needs    Weight Used For Calorie Calculations: 70.7 kg (155 lb 13.8 oz)     Energy Calorie Requirements (kcal): 1490 kcal/d  Energy Need " Method: [facility]     Protein Requirements: 65-87 g/d (1.5-2 g/kg IBW)  Weight Used For Protein Calculations: 43.2 kg (95 lb 3.8 oz)     Estimated Fluid Requirement Method: other (see comments)(Per MD or 1 mL/kcal)    Nutrition Prescription Ordered    Current Diet Order: NPO    Evaluation of Received Nutrient/Fluid Intake    Comments: LBM: PTA    Nutrition Risk    Level of Risk/Frequency of Follow-up: (2x/week)     Assessment and Plan    Nutrition Problem  Inadequate energy intake    Related to (etiology):   Inability to consume sufficient energy    Signs and Symptoms (as evidenced by):   NPO    Interventions(treatment strategy):  Collaboration of nutrition care w/ other providers     Nutrition Diagnosis Status:   New     Monitor and Evaluation    Food and Nutrient Intake: energy intake, food and beverage intake, enteral nutrition intake  Food and Nutrient Adminstration: diet order, enteral and parenteral nutrition administration  Physical Activity and Function: nutrition-related ADLs and IADLs  Anthropometric Measurements: weight, weight change  Biochemical Data, Medical Tests and Procedures: inflammatory profile, lipid profile, glucose/endocrine profile, gastrointestinal profile, electrolyte and renal panel  Nutrition-Focused Physical Findings: overall appearance     Nutrition Follow-Up    RD Follow-up?: Yes

## 2020-06-24 NOTE — PROGRESS NOTES
Pharmacokinetic Initial Assessment: IV Vancomycin    Assessment/Plan:    - Initiate intravenous vancomycin with  1500 mg x1   - Patient CHRISTIAN  1.8 >2.1  - Dose vancomycin by level  - Random level due 6/25 with morning labs    Pharmacy will continue to follow and monitor vancomycin.      Please contact pharmacy at extension [phone] with any questions regarding this assessment.     Thank you for the consult,   Jaycee Villatoro       Patient brief summary:  Yudy Jauregui is a 19 y.o. female initiated on antimicrobial therapy with IV Vancomycin for treatment of suspected Fever    Drug Allergies:   Review of patient's allergies indicates:  No Known Allergies    Actual Body Weight:   70.7 kg    Renal Function:   Estimated Creatinine Clearance: 47.3 mL/min (A) (based on SCr of 2.1 mg/dL (H)).,     Dialysis Method (if applicable):  N/A    CBC (last 72 hours):  Recent Labs   Lab Result Units 06/23/20  0420 06/23/20 2059 06/24/20  0340   WBC K/uL  --  9.39 9.11   Hemoglobin g/dL  --  10.9* 10.8*   Hemoglobin A1C % 6.30  --   --    Hematocrit %  --  35.1* 36.7*   Platelets K/uL  --  98* 94*   Gran% %  --  89.4* 87.7*   Lymph% %  --  4.3* 4.0*   Mono% %  --  5.9 7.9   Eosinophil% %  --  0.0 0.0   Basophil% %  --  0.0 0.0   Differential Method   --  Automated Automated       Metabolic Panel (last 72 hours):  Recent Labs   Lab Result Units 06/23/20  2100 06/24/20  0340   Sodium mmol/L 138  138 138  138   Potassium mmol/L 3.5  3.5 5.2*  5.2*   Chloride mmol/L 103  103 106  106   CO2 mmol/L 21*  21* 19*  19*   Glucose mg/dL 173*  173* 155*  155*   BUN, Bld mg/dL 56*  56* 64*  64*   Creatinine mg/dL 1.8*  1.8* 2.1*  2.1*   Albumin g/dL 3.9  3.9 3.6   Total Bilirubin mg/dL 4.1*  4.1* 3.8*   Alkaline Phosphatase U/L 38*  38* 35*   AST U/L 64*  64* 60*   ALT U/L 139*  139* 130*   Magnesium mg/dL 2.1  2.1 2.2   Phosphorus mg/dL 4.5  4.5 5.2*       Drug levels (last 3 results):  No results for input(s): VANCOMYCINRA,  VANCOMYCINPE, VANCOMYCINTR in the last 72 hours.    Microbiologic Results:  Microbiology Results (last 7 days)     ** No results found for the last 168 hours. **

## 2020-06-24 NOTE — PROGRESS NOTES
[facility]  Heart Transplant  Progress Note    Patient Name: Yudy Jauregui  MRN: [id number]  Admission Date: 6/23/2020  Hospital Length of Stay: 1 days  Attending Physician: Kaitlin Gamboa MD  Primary Care Provider: Niurka Sinclair MD  Principal Problem:Cardiogenic shock    Subjective:     **Interval History: Has been off all sedation since ~ 3 am. Opens eyes briefly and gags/coughs with suctioning, otherwise unresponsive. CTH -ve for acute processes. Neuro consulted, and planning to get EEG. Was anuric overnight, but UOP now  cc/hr. CVP 30. Nephrology has seen patient and rec IV Diuril. sVO2 on  2.5 mcg and Epi 0.02 mcg earlier this morning 64 with calculated CO 4.3 and  -  subsequently increased to 5 mcg. T max 100.9. S/P 2 -ve blood cultures from OSH's - given renal dysfunction, D/C'd Vanc and Cefepime and consulting ID. Will start tube feeds. TTE with contrast today shows BiV failure with LVEDD 6.5 and possible LV thrombus - Dr. Gamboa to review    Continuous Infusions:   dexmedetomidine (PRECEDEX) infusion Stopped (06/24/20 0333)    DOBUTamine 5 mcg/kg/min (06/24/20 1000)    epinephrine 0.02 mcg/kg/min (06/24/20 1000)    furosemide (LASIX) 10 mg/mL infusion (non-titrating) 30 mg/hr (06/24/20 1000)     Scheduled Meds:   chlorhexidine  15 mL Mouth/Throat BID    [START ON 6/25/2020] famotidine (PF)  20 mg Intravenous QHS    levetiracetam IVPB  750 mg Intravenous Q12H    lidocaine HCL 10 mg/ml (1%)  1 mL Other Once     PRN Meds:Dextrose 10% Bolus, glucagon (human recombinant), sodium chloride 0.9%, sodium chloride 0.9%    Review of patient's allergies indicates:  No Known Allergies  Objective:     Vital Signs (Most Recent):  Temp: 98.9 °F (37.2 °C) (06/24/20 0701)  Pulse: (!) 130 (06/24/20 1100)  Resp: 14 (06/24/20 1100)  BP: 113/64 (06/24/20 1100)  SpO2: 98 % (06/24/20 1100) Vital Signs (24h Range):  Temp:  [98.1 °F (36.7 °C)-100.9 °F (38.3 °C)] 98.9 °F (37.2 °C)  Pulse:   [121-133] 130  Resp:  [12-18] 14  SpO2:  [96 %-100 %] 98 %  BP: ()/(49-69) 113/64  Arterial Line BP: ()/(59-82) 119/71     Patient Vitals for the past 72 hrs (Last 3 readings):   Weight   06/24/20 1000 70.3 kg (155 lb)   06/24/20 0900 70.7 kg (155 lb 13.8 oz)   06/23/20 2100 70.7 kg (155 lb 13.8 oz)     Body mass index is 31.31 kg/m².      Intake/Output Summary (Last 24 hours) at 6/24/2020 1122  Last data filed at 6/24/2020 1000  Gross per 24 hour   Intake 594.08 ml   Output 310 ml   Net 284.08 ml       Hemodynamic Parameters:       Telemetry: ST    Physical Exam  Constitutional:       Comments: Short neck, short fingers   HENT:      Head: Normocephalic and atraumatic.   Eyes:      Conjunctiva/sclera: Conjunctivae normal.      Comments: Fixed gaze, pupils 2-3 mm and sluggishly reactive   Neck:      Musculoskeletal: Neck supple.      Comments: JVP at temple, LIJ TLC  Cardiovascular:      Rate and Rhythm: Regular rhythm. Tachycardia present.      Comments: + S3  Pulmonary:      Effort: Pulmonary effort is normal.      Breath sounds: Normal breath sounds.      Comments: Intubated on vent  Abdominal:      Palpations: Abdomen is soft.      Comments: Hypoactive bowel sounds   Musculoskeletal:         General: No swelling.      Comments: No pitting edema   Skin:     General: Skin is warm and dry.      Capillary Refill: Capillary refill takes 2 to 3 seconds.   Neurological:      Comments: Briefly opens eyes and coughs/gags with suctioning, otherwise unresponsive         Significant Labs:  CBC:  Recent Labs   Lab 06/23/20 2059 06/24/20  0340   WBC 9.39 9.11   RBC 3.98* 4.08   HGB 10.9* 10.8*   HCT 35.1* 36.7*   PLT 98* 94*   MCV 88 90   MCH 27.4 26.5*   MCHC 31.1* 29.4*     BNP:  Recent Labs   Lab 06/23/20 2059   BNP >4,900*     CMP:  Recent Labs   Lab 06/23/20 2100 06/24/20  0340   *  173* 155*  155*   CALCIUM 11.1*  11.1* 10.5  10.5   ALBUMIN 3.9  3.9 3.6   PROT 7.8  7.8 7.5     138 138   138   K 3.5  3.5 5.2*  5.2*   CO2 21*  21* 19*  19*     103 106  106   BUN 56*  56* 64*  64*   CREATININE 1.8*  1.8* 2.1*  2.1*   ALKPHOS 38*  38* 35*   *  139* 130*   AST 64*  64* 60*   BILITOT 4.1*  4.1* 3.8*      Coagulation:   Recent Labs   Lab 06/23/20 2059 06/24/20  0340   INR 1.5* 1.5*   APTT 24.0 25.2     LDH:  No results for input(s): LDH in the last 72 hours.  Microbiology:  Microbiology Results (last 7 days)     ** No results found for the last 168 hours. **          I have reviewed all pertinent labs within the past 24 hours.    Estimated Creatinine Clearance: 47 mL/min (A) (based on SCr of 2.1 mg/dL (H)).    Diagnostic Results:  I have reviewed all pertinent imaging results/findings within the past 24 hours.    Assessment and Plan:     Ms. Yudy Jauregui is a 19 y.o.female with prior history of focal epilepsy with complex partial seizures, developmental delay and ADHD. She presented to [facility] on 6/20/2020 with symptoms of shortness of breath, fatigue, and feeling unwell. Further work up showed that she was in cardiogenic shock. Further work up showed transaminitis, acute kidney injury and acute hypoxic respiratory failure requiring mechanical ventilation. During her stay at [facility], she spiked fever 100.8 and continued on inotrope support (see below). She was on Lasix and Albumin and Diuril which was held secondary to elevated urine output for the last lasts. He ventilation setting improved and her FiO2 decrease to medium setting oxygenations. She was on solumedrol and received IVIG 100g as well. Echo noted to have mod mitral valve insufficiency, mild tricuspid valve insufficiency, moderate LV dilation, decreased RV and LV systolic function, small pericardial effusion and bilateral pleural effusion; EF ~20%. Repeat echo with similar results. On arrival she was on multiple infusions including Milrinone Infusion  0.5 mcg/kg/min, Heparin 1 unit/mL, Epi 0.03 mcg/kg/min, and sedated with Fentanyl 100 mcg/hr and dexmedetomidine 0.5 mcg/kg/hr.     Ped History:  Born full term and born with    She developed RSV as two weeks old  Developmental delay and interllectula diability and ADHD and she was on different medications     Family History:  No first degree relative with heart disease   She has a a sibling sister who passed at age of 12 months with developmental dealy related to the brain (not to heart).     * Cardiogenic shock  - Echo prior arrival showed Mod MR, Mild TR, Moderate LV dilation, decreased RV and LV systolic function, small pericardial effusion and bilateral pleural effusion; EF ~20%. TTE with contrast done this morning here: LVEDD 6.5, LVEF 8%, diastolic dysfunction, RV mildly dilated and mod depressed, mod MR, trace TR and possible LV thrombus. - hyralazine  The etiology of her cardiogenic shock could be viral myocarditis associated with multi-organ dysfunction, her degree of cardiomegaly points to longer standing heart failure and not acute  - CVP 30. Was anuric overnight, but UOP no  cc/hr. Continue Lasix 30 mg/hr and give 250 mg IV Diuril X 1  - sVO2 on  2.5 mcg and Epi 0.02 mcg 64 with calculated CO 4.3 and .  increased to 5 mcg  - Add low dose Hydralazine/Isordil     CHRISTIAN (acute kidney injury)  - BUN/creatinine on admit 64/2.1  - Was anuric overnight, but UOP is now  cc/hr  - Renal US  - Nephrology consulted    Coagulation defect, unspecified  - During her stay in [place],Hematology/oncology consulted to aid in coagulopathy.   - Liver US with doppler significant for hepatomegaly with fatty liver.   - s/p vitamin K x2.  - Their impression is more concerning about acute liver dysfunction more than DIC.   - Send for Factor VIII    - Plasma concentration of factor VIII is not decreased with liver disease  - Daily PT/INR, aPTT, fibrinogen, CBC + diff   - Goal platelet >10-20. Consider  transfusing pre-procedure     Seizure disorder  - Has been off sedation since ~ 3 am, and remains essentially unresponsive. CTH -ve. Will get 24 hour EEG   - Continue her Keppra at her home dose 750 mg BID   - Neurology consulted    ADHD  - Hold Lisdexamfetamine to avoid counteraction with sedative medications     Acute respiratory failure  - Current on A/C ventilation with RR 14, Vt 300, PEEP 8, FiO2 40%  - Weaning for goal of Vt 270 (6 ml/kg ideal body weight)  Vent Mode: A/C  Oxygen Concentration (%):  [40-97] 40  Resp Rate Total:  [14 br/min-20 br/min] 20 br/min  Vt Set:  [300 mL] 300 mL  PEEP/CPAP:  [8 cmH20] 8 cmH20  Mean Airway Pressure:  [9 [id number] cmH20] 9.2 cmH20   - CXR on admit c/w CHF      Uninterrupted Critical Care/Counseling Time (not including procedures): 45 [place] [id number]  Heart Transplant  [facility]
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
## 2020-06-24 NOTE — ASSESSMENT & PLAN NOTE
Patient currently on ventilator and intubated. Most recent BG with improving acidemia and hypercarbia.     Continued management per primary team

## 2020-06-24 NOTE — PLAN OF CARE
CMICU DAILY GOALS       A: Awake    RASS: Goal -    Actual - RASS (Spencer Agitation-Sedation Scale): -2-->light sedation   Restraint necessity: not needed  B: Breath   SBT: Not attempted   C: Coordinate A & B, analgesics/sedatives   Pain: managed    SAT: Not attempted  D: Delirium   CAM-ICU: Overall CAM-ICU: Positive  E: Early Mobility   MOVE Screen: Fail   Activity: Activity Management: bedrest maintained per order  FAS: Feeding/Nutrition   Diet order: Diet/Nutrition Received: tube feeding,   Fluid restriction:    T: Thrombus   DVT prophylaxis: VTE Required Core Measure: (SCDs) Sequential compression device initiated/maintained  H: HOB Elevation   Head of Bed (HOB): HOB at 30-45 degrees  U: Ulcer Prophylaxis   GI: yes  G: Glucose control   managed    S: Skin   Bundle compliance: yes   Bathing/Skin Care: bath, chlorhexidine, dressed/undressed, incontinence care, linen changed, bath, complete Date: 6/23/2020 PM shift  B: Bowel Function   constipation   I: Indwelling Catheters   Aguirre necessity:      Urethral Catheter 06/23/20 2040 Non-latex-Reason for Continuing Urinary Catheterization: Critically ill in ICU requiring intensive monitoring   CVC necessity: Yes   IPAD offered: Not appropriate  D: De-escalation Antibx   Yes  Plan for the day   Consulted neuro, attempted LP unsuccessful, EEG monitoring, starting tube feeds, plan tomorrow is to assess readiness for extubation  Family/Goals of care/Code Status   Code Status: Full Code     No acute events throughout day, VS and assessment per flow sheet, patient progressing towards goals as tolerated, plan of care reviewed with Yudy Jauregui and family, all concerns addressed, will continue to monitor.
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
## 2020-06-24 NOTE — SUBJECTIVE & OBJECTIVE
**Interval History: Has been off all sedation since ~ 3 am. Opens eyes briefly and gags/coughs with suctioning, otherwise unresponsive. CTH -ve for acute processes. Neuro consulted, and planning to get EEG. Was anuric overnight, but UOP now  cc/hr. CVP 30. Nephrology has seen patient and rec IV Diuril. sVO2 on  2.5 mcg and Epi 0.02 mcg earlier this morning 64 with calculated CO 4.3 and  -  subsequently increased to 5 mcg. T max 100.9. S/P 2 -ve blood cultures from OSH's - given renal dysfunction, D/C'd Vanc and Cefepime and consulting ID. Will start tube feeds. TTE with contrast today shows BiV failure with LVEDD 6.5 and possible LV thrombus - Dr. Gamboa to review    Continuous Infusions:   dexmedetomidine (PRECEDEX) infusion Stopped (06/24/20 0333)    DOBUTamine 5 mcg/kg/min (06/24/20 1000)    epinephrine 0.02 mcg/kg/min (06/24/20 1000)    furosemide (LASIX) 10 mg/mL infusion (non-titrating) 30 mg/hr (06/24/20 1000)     Scheduled Meds:   chlorhexidine  15 mL Mouth/Throat BID    [START ON 6/25/2020] famotidine (PF)  20 mg Intravenous QHS    levetiracetam IVPB  750 mg Intravenous Q12H    lidocaine HCL 10 mg/ml (1%)  1 mL Other Once     PRN Meds:Dextrose 10% Bolus, glucagon (human recombinant), sodium chloride 0.9%, sodium chloride 0.9%    Review of patient's allergies indicates:  No Known Allergies  Objective:     Vital Signs (Most Recent):  Temp: 98.9 °F (37.2 °C) (06/24/20 0701)  Pulse: (!) 130 (06/24/20 1100)  Resp: 14 (06/24/20 1100)  BP: 113/64 (06/24/20 1100)  SpO2: 98 % (06/24/20 1100) Vital Signs (24h Range):  Temp:  [98.1 °F (36.7 °C)-100.9 °F (38.3 °C)] 98.9 °F (37.2 °C)  Pulse:  [121-133] 130  Resp:  [12-18] 14  SpO2:  [96 %-100 %] 98 %  BP: ()/(49-69) 113/64  Arterial Line BP: ()/(59-82) 119/71     Patient Vitals for the past 72 hrs (Last 3 readings):   Weight   06/24/20 1000 70.3 kg (155 lb)   06/24/20 0900 70.7 kg (155 lb 13.8 oz)   06/23/20 2100 70.7 kg (155 lb  13.8 oz)     Body mass index is 31.31 kg/m².      Intake/Output Summary (Last 24 hours) at 6/24/2020 1122  Last data filed at 6/24/2020 1000  Gross per 24 hour   Intake 594.08 ml   Output 310 ml   Net 284.08 ml       Hemodynamic Parameters:       Telemetry: ST    Physical Exam  Constitutional:       Comments: Short neck, short fingers   HENT:      Head: Normocephalic and atraumatic.   Eyes:      Conjunctiva/sclera: Conjunctivae normal.      Comments: Fixed gaze, pupils 2-3 mm and sluggishly reactive   Neck:      Musculoskeletal: Neck supple.      [facility]  Cardiovascular:      Rate and Rhythm: Regular rhythm. Tachycardia present.      Comments: + S3  Pulmonary:      Effort: Pulmonary effort is normal.      Breath sounds: Normal breath sounds.      Comments: Intubated on vent  Abdominal:      Palpations: Abdomen is soft.      Comments: Hypoactive bowel sounds   Musculoskeletal:         General: No swelling.      Comments: No pitting edema   Skin:     General: Skin is warm and dry.      Capillary Refill: Capillary refill takes 2 to 3 seconds.   Neurological:      Comments: Briefly opens eyes and coughs/gags with suctioning, otherwise unresponsive         Significant Labs:  CBC:  Recent Labs   Lab 06/23/20 2059 06/24/20  0340   WBC 9.39 9.11   RBC 3.98* 4.08   HGB 10.9* 10.8*   HCT 35.1* 36.7*   PLT 98* 94*   MCV 88 90   MCH 27.4 26.5*   MCHC 31.1* 29.4*     BNP:  Recent Labs   Lab 06/23/20 2059   BNP >4,900*     CMP:  Recent Labs   Lab 06/23/20 2100 06/24/20  0340   *  173* 155*  155*   CALCIUM 11.1*  11.1* 10.5  10.5   ALBUMIN 3.9  3.9 3.6   PROT 7.8  7.8 7.5     138 138  138   K 3.5  3.5 5.2*  5.2*   CO2 21*  21* 19*  19*     103 106  106   BUN 56*  56* 64*  64*   CREATININE 1.8*  1.8* 2.1*  2.1*   ALKPHOS 38*  38* 35*   *  139* 130*   AST 64*  64* 60*   BILITOT 4.1*  4.1* 3.8*      Coagulation:   Recent Labs   Lab 06/23/20  0576  06/24/20  0340   INR 1.5* 1.5*   APTT 24.0 25.2     LDH:  No results for input(s): LDH in the last 72 hours.  Microbiology:  Microbiology Results (last 7 days)     ** No results found for the last 168 hours. **          I have reviewed all pertinent labs within the past 24 hours.    Estimated Creatinine Clearance: 47 mL/min (A) (based on SCr of 2.1 mg/dL (H)).    Diagnostic Results:  I have reviewed all pertinent imaging results/findings within the past 24 hours.

## 2020-06-24 NOTE — CONSULTS
[facility]  Neurology  Consult Note    Patient Name: Yudy Jauregui  MRN: [id number]  Admission Date: 6/23/2020  Hospital Length of Stay: 1 days  Code Status: Full Code   Attending Provider: Kaitlin Gamboa MD   Consulting Provider: Suleman Chaudhry MD  Primary Care Physician: Niurka Sinclair MD  Principal Problem:Cardiogenic shock    Inpatient consult to Neurology  Consult performed by: Suleman Chaudhry MD  Consult ordered by: Lyndsey Solano NP         Subjective:     Chief Complaint:  AMS     HPI:   Ms. Yudy Jauregui is a 19 yoF with previous complex partial seizures (controlled over last 2 years on keppra), developmental delay, scoliosis, and ADHD. She presented to [facility] on 6/20/2020 with SOB and fatigue, after which she was found to be in cardiogenic shock. Further work up revealed an ongoing transaminitis, CHRISTIAN and acute hypoxic respiratory failure requiring mechanical ventilation. Her hospital course was complicated by a fever (100.8) and was later started on Lasix, Albumin and Diuril which was later held secondary to elevated urine output. She additionally was started on solumedrol and received IVIG 100g as well. Echo at OSH revealed moderate mitral valve insufficiency with moderate LV dilation and bilateral pleural effusion (EF ~20%).     On arrival to [facility] ED, she remained on a milrinone infusion of 0.5 mcg/kg/min, heparin  (at 1 unit/mL), Epi (at 0.03 mcg/kg/min), fentanyl (at 100 mcg/hr) and dexmedetomidine (at 0.5 mcg/kg/hr). Neurology consulted for AMS on 6/24. Patient was off of all sedation and precedex at the time of examination but remained on dobutamine and pressor support.      Past Medical History:   Diagnosis Date    Intellectual disability     Seizures     Strabismus        Past Surgical History:   Procedure Laterality Date    STRABISMUS SURGERY  9/99    BMR recession OU .5MM    STRABISMUS SURGERY  7/2001    BSO tenotomy/LLR recession 4mm     STRABISMUS SURGERY  5/2005    RLR recession 6mm/ IO myectomy OU        Review of patient's allergies indicates:  No Known Allergies      No current facility-administered medications on file prior to encounter.      Current Outpatient Medications on File Prior to Encounter   Medication Sig    amitriptyline (ELAVIL) 75 MG tablet TK 1 T PO QHS    clonidine (CATAPRES) 0.1 MG tablet Take 0.1 mg by mouth 2 (two) times daily.    diazePAM 5-7.5-10 mg (DIASTAT ACUDIAL) 5-7.5-10 mg Kit Place 1 each (10 mg total) rectally as needed (seizure > 5 mins).    INTROVALE 0.15 mg-30 mcg (91) per tablet TAKE 1 TABLET BY MOUTH EVERY DAY    levETIRAcetam (KEPPRA) 750 MG Tab Take 1 tablet (750 mg total) by mouth 2 (two) times daily.    levonorgestrel-ethinyl estradiol (INTROVALE) 0.15 mg-30 mcg (91) per tablet Take 1 tablet by mouth once daily.    VYVANSE 50 mg capsule Take 50 mg by mouth once daily.     Family History     Problem Relation (Age of Onset)    Amblyopia Paternal Aunt    Cancer Maternal Grandfather    Cataracts Maternal Grandmother    Hypertension Father, Maternal Grandmother, Paternal Grandmother    Ovarian cancer Maternal Aunt    Strabismus Father, Paternal Aunt    Thyroid disease Paternal Grandfather        Tobacco Use    Smoking status: Never Smoker    Smokeless tobacco: Never Used   Substance and Sexual Activity    Alcohol use: No    Drug use: No    Sexual activity: Never     Birth control/protection: OCP     Comment: Seasonale     Review of Systems   Unable to perform ROS: Mental status change   Constitutional: Positive for fatigue. Negative for chills, diaphoresis and fever.   HENT: Negative for rhinorrhea and sneezing.    Eyes: Negative for discharge and redness.   Respiratory: Positive for shortness of breath (verntilator). Negative for cough and wheezing.    Cardiovascular: Negative for leg swelling.   Gastrointestinal: Negative for diarrhea and vomiting.   Skin: Negative for pallor and rash.    Neurological: Positive for speech difficulty and weakness. Negative for tremors and facial asymmetry.   Psychiatric/Behavioral: Positive for confusion and decreased concentration.     Objective:     Vital Signs (Most Recent):  Temp: 97.5 °F (36.4 °C) (06/24/20 1101)  Pulse: (!) 133 (06/24/20 1300)  Resp: 17 (06/24/20 1300)  BP: 108/62 (06/24/20 1300)  SpO2: 96 % (06/24/20 1300) Vital Signs (24h Range):  Temp:  [97.5 °F (36.4 °C)-100.9 °F (38.3 °C)] 97.5 °F (36.4 °C)  Pulse:  [121-134] 133  Resp:  [12-20] 17  SpO2:  [95 %-100 %] 96 %  BP: ()/(49-69) 108/62  Arterial Line BP: ()/(59-82) 123/73     Weight: 70.3 kg (155 lb)  Body mass index is 31.31 kg/m².    Physical Exam  Constitutional:       Comments: Opens eyes to command   HENT:      Head:      Comments: intubated     Nose: Nose normal.   Eyes:      General: No scleral icterus.     Conjunctiva/sclera: Conjunctivae normal.      Comments: Constricted pupils. Sluggish reactivity to light without consensual response on other eye. No EOM when prompted   Cardiovascular:      Pulses: Normal pulses.      Heart sounds: No murmur.      Comments: Intermittent tachycardia  Pulmonary:      Effort: Respiratory distress present.      Breath sounds: No wheezing or rales.      Comments: Intubated on ventilator   Abdominal:      General: Abdomen is flat. There is distension.      Tenderness: There is no abdominal tenderness.   Musculoskeletal:         General: No swelling.      Comments: Decreased ROM 4/4   Skin:     General: Skin is warm.      Coloration: Skin is not jaundiced.      Findings: No bruising.   Neurological:      Cranial Nerves: Cranial nerve deficit present.      Sensory: Sensory deficit present.      Motor: Weakness present.      Deep Tendon Reflexes: Reflexes abnormal (upgoing plantar reflex).      Reflex Scores:       Bicep reflexes are 2+ on the right side and 2+ on the left side.       Brachioradialis reflexes are 2+ on the right side and 2+ on the  left side.       Patellar reflexes are 2+ on the right side and 2+ on the left side.     Comments: Not withdrawing from wain  No facial grimace         NEUROLOGICAL EXAMINATION:     MENTAL STATUS   Oriented to person.   Oriented to place.   Oriented to time.   Speech: (Intubated)  Level of consciousness: opens eyes to command. does not follow other commands.    CRANIAL NERVES     CN III, IV, VI   Pupils: pinpoint  Right pupil: Size: 2 mm. Shape: regular. Reactivity: sluggish. Accommodation: absent.   Left pupil: Size: 2 mm. Shape: regular. Reactivity: sluggish. Accommodation: absent.   CN III: bilateral CN III palsy  CN VI: bilateral CN VI palsy    CN V   Right facial sensation deficit: complete  Left facial sensation deficit: complete  Right corneal reflex: abnormal  Left corneal reflex: abnormal    CN IX, X   Right gag reflex: normal  Left gag reflex: normal    MOTOR EXAM   Muscle bulk: decreased  Overall muscle tone: decreased    REFLEXES     Reflexes   Right brachioradialis: 2+  Left brachioradialis: 2+  Right biceps: 2+  Left biceps: 2+  Right patellar: 2+  Left patellar: 2+  Right plantar: upgoing  Left plantar: upgoing  Right ankle clonus: absent  Left ankle clonus: absent    SENSORY EXAM        No weithdrawal from pain. Patient currently obtunded.       Significant Labs:   Recent Lab Results       06/24/20  1313   06/24/20  1036   06/24/20  1013   06/24/20  0756   06/24/20  0752        Albumin               Alkaline Phosphatase               Allens Test       N/A N/A     ALT               Amorphous, UA               Anion Gap               Ao peak hieu     0.81         Ao VTI     8.98         Appearance, UA               aPTT               AST               AV valve area     1.87         AV mean gradient     2         AV index (prosthetic)     0.61         AV peak gradient     3         AV Velocity Ratio     0.67         Bacteria, UA               Baso #               Basophil%               Bilirubin (UA)                BILIRUBIN TOTAL               BNP               Site       Constance/UAC Other     BSA     1.71         BUN, Bld               Calcium               Calcium, Ion               Chloride               CO2               Color, UA               Creatinine               Creatinine, Random Ur   27.0  Comment:  The random urine reference ranges provided were established   for 24 hour urine collections.  No reference ranges exist for  random urine specimens.  Correlate clinically.             Left Ventricle Relative Wall Thickness     0.21         DelSys       Adult Vent Adult Vent     Differential Method               E/E' ratio     14.14         eGFR if                eGFR if non                Eos #               Eosinophil%               Factor VIII Activity               Ferritin               Fibrinogen               FiO2       50 50     FS     2         Glucose               Glucose, UA               Gran # (ANC)               Gran%               Group & Rh               Hematocrit               Hemoglobin               Hyaline Casts, UA               Immature Grans (Abs)               Immature Granulocytes               INDIRECT ROSE               INR               IVS     0.61         Ketones, UA               LA WIDTH     3.90         Lactate, Ryan               Left Atrium Major Axis     5.77         Left Atrium Minor Axis     5.54         LA size     3.95         LA volume     74.02         LA Volume Index     44.7         LVOT area     3.1         Leukocytes, UA               Lipase               LV LATERAL E/E' RATIO     11.00         LV SEPTAL E/E' RATIO     19.80         LV Diastolic Volume     194.73         LV Diastolic Volume Index     117.68         LVIDD     6.50         LVIDS     6.40         LV mass     165.42         LV Mass Index     100         LVOT diameter     1.98         LVOT peak hieu     0.54         LVOT stroke volume     16.80         LVOT peak VTI      5.46         LV Systolic Volume     175.18         LV Systolic Volume Index     105.9         Lymph #               Lymph%               Magnesium               MCH               MCHC               MCV               Mean e'     0.07         Microscopic Comment               Min Vol       5.31 5.98     Miscellaneous Test Name Lab Vivi [id number]             Mode       AC/PRVC AC/PRVC     Mono #               Mono%               MPV               MV Peak E Oz     0.99         NITRITE UA               nRBC               Occult Blood UA               PEEP       8 8     pH, UA               Phosphorus               PiP       11 12     Platelets               POC BE       -4 -2     POC HCO3       21.9 24.2     POC PCO2       38.8 47.5     POC PH       7.359 7.316     POC PO2       92 37     POC SATURATED O2       97 64     POC TCO2       23 26     POCT Glucose               Potassium               Prot/Creat Ratio, Ur   0.59           PROTEIN TOTAL               Protein, UA               Protein, Urine Random   16  Comment:  The random urine reference ranges provided were established   for 24 hour urine collections.  No reference ranges exist for  random urine specimens.  Correlate clinically.             Protime               PW     0.68         RA Major Axis     5.01         Rate       14 14     RA Width     3.30         RBC               RBC, UA               RDW               RV S'     7.87         RVDD     4.10         Sample       ARTERIAL VENOUS     Sinus     3.18         Sodium               Sodium Urine Random   53  Comment:  The random urine reference ranges provided were established   for 24 hour urine collections.  No reference ranges exist for  random urine specimens.  Correlate clinically.             Sp02       98 97     Specific Thompsons, UA               Specimen UA               Squam Epithel, UA               TAPSE     1.03         TDI SEPTAL     0.05         TDI LATERAL     0.09         Triscuspid Valve  Regurgitation Peak Gradient     17         TR Max Oz     2.04         Vancomycin, Random               Vt       300 300     WBC, UA               WBC                                06/24/20  0752   06/24/20  0726   06/24/20  0340   06/23/20  2157   06/23/20  2125        Albumin     3.6         Alkaline Phosphatase     35         Allens Test               ALT     130         Amorphous, UA   Occasional           Anion Gap     13              13         Ao peak oz               Ao VTI               Appearance, UA   Cloudy           aPTT     25.2  Comment:  aPTT therapeutic range = 39-69 seconds         AST     60         AV valve area               AV mean gradient               AV index (prosthetic)               AV peak gradient               AV Velocity Ratio               Bacteria, UA   Few           Baso #     0.00         Basophil%     0.0         Bilirubin (UA)   Negative           BILIRUBIN TOTAL     3.8  Comment:  For infants and newborns, interpretation of results should be based  on gestational age, weight and in agreement with clinical  observations.  Premature Infant recommended reference ranges:  Up to 24 hours.............<8.0 mg/dL  Up to 48 hours............<12.0 mg/dL  3-5 days..................<15.0 mg/dL  6-29 days.................<15.0 mg/dL           BNP               Site               BSA               BUN, Bld     64              64         Calcium     10.5              10.5         Calcium, Ion               Chloride     106              106         CO2     19              19         Color, UA   Gifty           Creatinine     2.1              2.1         Creatinine, Random Ur               Left Ventricle Relative Wall Thickness               DelSys               Differential Method     Automated         E/E' ratio               eGFR if      37.9              37.9         eGFR if non      32.9  Comment:  Calculation used to obtain the estimated glomerular  filtration  rate (eGFR) is the CKD-EPI equation.                 32.9  Comment:  Calculation used to obtain the estimated glomerular filtration  rate (eGFR) is the CKD-EPI equation.            Eos #     0.0         Eosinophil%     0.0         Factor VIII Activity               Ferritin               Fibrinogen               FiO2               FS               Glucose     155              155         Glucose, UA   Negative           Gran # (ANC)     8.0         Gran%     87.7         Group & Rh         A POS     Hematocrit     36.7         Hemoglobin     10.8         Hyaline Casts, UA   24           Immature Grans (Abs)     0.04  Comment:  Mild elevation in immature granulocytes is non specific and   can be seen in a variety of conditions including stress response,   acute inflammation, trauma and pregnancy. Correlation with other   laboratory and clinical findings is essential.           Immature Granulocytes     0.4         INDIRECT ROSE         NEG     INR     1.5  Comment:  Coumadin Therapy:  2.0 - 3.0 for INR for all indicators except mechanical heart valves  and antiphospholipid syndromes which should use 2.5 - 3.5.           IVS               Ketones, UA   Negative           LA WIDTH               Lactate, Ryan               Left Atrium Major Axis               Left Atrium Minor Axis               LA size               LA volume               LA Volume Index               LVOT area               Leukocytes, UA   Negative           Lipase               LV LATERAL E/E' RATIO               LV SEPTAL E/E' RATIO               LV Diastolic Volume               LV Diastolic Volume Index               LVIDD               LVIDS               LV mass               LV Mass Index               LVOT diameter               LVOT peak hieu               LVOT stroke volume               LVOT peak VTI               LV Systolic Volume               LV Systolic Volume Index               Lymph #     0.4         Lymph%     4.0          Magnesium     2.2         MCH     26.5         MCHC     29.4         MCV     90         Mean e'               Microscopic Comment   SEE COMMENT  Comment:  Other formed elements not mentioned in the report are not   present in the microscopic examination.              Min Vol               Miscellaneous Test Name               Mode               Mono #     0.7         Mono%     7.9         MPV     11.7         MV Peak E Oz               NITRITE UA   Negative           nRBC     1         Occult Blood UA   2+           PEEP               pH, UA   5.0           Phosphorus     5.2         PiP               Platelets     94         POC BE               POC HCO3               POC PCO2               POC PH               POC PO2               POC SATURATED O2               POC TCO2               POCT Glucose 169     167       Potassium     5.2              5.2         Prot/Creat Ratio, Ur               PROTEIN TOTAL     7.5         Protein, UA   1+  Comment:  Recommend a 24 hour urine protein or a urine   protein/creatinine ratio if globulin induced proteinuria is  clinically suspected.             Protein, Urine Random               Protime     14.8         PW               RA Major Axis               Rate               RA Width               RBC     4.08         RBC, UA   6           RDW     16.4         RV S'               RVDD               Sample               Sinus               Sodium     138              138         Sodium Urine Random               Sp02               Specific Page, UA   1.010           Specimen UA   Urine, Catheterized           Squam Epithel, UA   0           TAPSE               TDI SEPTAL               TDI LATERAL               Triscuspid Valve Regurgitation Peak Gradient               TR Max Oz               Vancomycin, Random     20.4         Vt               WBC, UA   3           WBC     9.11                          06/23/20  2114   06/23/20  2100   06/23/20 2059        Albumin   3.9           3.9       Alkaline Phosphatase   38          38       Allens Test N/A         ALT   139          139       Amorphous, UA           Anion Gap   14          14       Ao peak hieu           Ao VTI           Appearance, UA           aPTT     24.0  Comment:  aPTT therapeutic range = 39-69 seconds     AST   64          64       AV valve area           AV mean gradient           AV index (prosthetic)           AV peak gradient           AV Velocity Ratio           Bacteria, UA           Baso #     0.00     Basophil%     0.0     Bilirubin (UA)           BILIRUBIN TOTAL   4.1  Comment:  For infants and newborns, interpretation of results should be based  on gestational age, weight and in agreement with clinical  observations.  Premature Infant recommended reference ranges:  Up to 24 hours.............<8.0 mg/dL  Up to 48 hours............<12.0 mg/dL  3-5 days..................<15.0 mg/dL  6-29 days.................<15.0 mg/dL            4.1  Comment:  For infants and newborns, interpretation of results should be based  on gestational age, weight and in agreement with clinical  observations.  Premature Infant recommended reference ranges:  Up to 24 hours.............<8.0 mg/dL  Up to 48 hours............<12.0 mg/dL  3-5 days..................<15.0 mg/dL  6-29 days.................<15.0 mg/dL         BNP     >4,900  Comment:  Values of less than 100 pg/ml are consistent with non-CHF populations.     Site Other         BSA           BUN, Bld   56          56       Calcium   11.1          11.1       Calcium, Ion     1.36     Chloride   103          103       CO2   21          21       Color, UA           Creatinine   1.8          1.8       Creatinine, Random Ur           Left Ventricle Relative Wall Thickness           Dels Adult Vent         Differential Method     Automated     E/E' ratio           eGFR if    45.7          45.7       eGFR if non    39.7  Comment:  Calculation used to  obtain the estimated glomerular filtration  rate (eGFR) is the CKD-EPI equation.             39.7  Comment:  Calculation used to obtain the estimated glomerular filtration  rate (eGFR) is the CKD-EPI equation.          Eos #     0.0     Eosinophil%     0.0     Factor VIII Activity     226     Ferritin   235       Fibrinogen     255     FiO2           FS           Glucose   173          173       Glucose, UA           Gran # (ANC)     8.4     Gran%     89.4     Group & Rh           Hematocrit     35.1     Hemoglobin     10.9     Hyaline Casts, UA           Immature Grans (Abs)     0.04  Comment:  Mild elevation in immature granulocytes is non specific and   can be seen in a variety of conditions including stress response,   acute inflammation, trauma and pregnancy. Correlation with other   laboratory and clinical findings is essential.       Immature Granulocytes     0.4     INDIRECT ROSE           INR     1.5  Comment:  Coumadin Therapy:  2.0 - 3.0 for INR for all indicators except mechanical heart valves  and antiphospholipid syndromes which should use 2.5 - 3.5.       IVS           Ketones, UA           LA WIDTH           Lactate, Ryna     2.0  Comment:  Falsely low lactic acid results can be found in samples   containing >=13.0 mg/dL total bilirubin and/or >=3.5 mg/dL   direct bilirubin.       Left Atrium Major Axis           Left Atrium Minor Axis           LA size           LA volume           LA Volume Index           LVOT area           Leukocytes, UA           Lipase   74       LV LATERAL E/E' RATIO           LV SEPTAL E/E' RATIO           LV Diastolic Volume           LV Diastolic Volume Index           LVIDD           LVIDS           LV mass           LV Mass Index           LVOT diameter           LVOT peak hieu           LVOT stroke volume           LVOT peak VTI           LV Systolic Volume           LV Systolic Volume Index           Lymph #     0.4     Lymph%     4.3     Magnesium   2.1          2.1        MCH     27.4     MCHC     31.1     MCV     88     Mean e'           Microscopic Comment           Min Vol           Miscellaneous Test Name           Mode AC/PRVC         Mono #     0.6     Mono%     5.9     MPV     11.7     MV Peak E Oz           NITRITE UA           nRBC     0     Occult Blood UA           PEEP           pH, UA           Phosphorus   4.5          4.5       PiP           Platelets     98     POC BE -1         POC HCO3 25.3         POC PCO2 48.9         POC PH 7.322         POC PO2 37         POC SATURATED O2 65         POC TCO2 27         POCT Glucose           Potassium   3.5          3.5       Prot/Creat Ratio, Ur           PROTEIN TOTAL   7.8          7.8       Protein, UA           Protein, Urine Random           Protime     14.7     PW           RA Major Axis           Rate           RA Width           RBC     3.98     RBC, UA           RDW     16.0     RV S'           RVDD           Sample VENOUS         Sinus           Sodium   138          138       Sodium Urine Random           Sp02           Specific Gravity, UA           Specimen UA           Squam Epithel, UA           TAPSE           TDI SEPTAL           TDI LATERAL           Triscuspid Valve Regurgitation Peak Gradient           TR Max Oz           Vancomycin, Random           Vt           WBC, UA           WBC     9.39         All pertinent lab results from the past 24 hours have been reviewed.    Significant Imaging: I have reviewed all pertinent imaging results/findings within the past 24 hours.    Assessment and Plan:     * Cardiogenic shock  Patient with developmental delay, scoliosis, ADHD, and epiliepsy (controlled on keppra) transferred from OSH with cardiogenic shock with an associated liver and kidney injury. Also a previously noted episode of being febrile >101. She was sedated and intubated on arrival from OSH and on dobutamine drip.     Per Echo at OSH, her HF seems to be possibly secondary to viral etiology causing  dilation and decreased EF. This has lead to cardiogenic shock with an associated acute hypoxemic respiratory failure, acidemia with hypercarbia, liver injury with transaminitis, and CHRISTIAN. Her LFTs are currently down trending but she has a persistently elevated creatinine    Patient's cardiologic shock is seemingly from a viral etiology. Her AMS may be 2/2 to viral encephalitis pr from persistent effects of sedation of fentanyl and precedex in the setting of CHRISTIAN and liver injury causing metabolites to stay in the body for extensive period of time. It is also worth questioning if the patient's ongoing constellation of symptoms could be a post COVID syndrome. Also possibly epilepsy/status causing AMS as her ongoing symptoms may have lowered her seizure threshold.     Recommendations:  -- Covid antibodies ordered; will follow up  -- Attempted LP at bedside; unsuccessful  -- Recommend that primary team consult anesthesia for LP  -- LP labs placed; will follow up  -- Recommend 24hr EEG; will follow up\  -- Recommend obtaining TSH and NH4      CHRISTIAN (acute kidney injury)  See Cardiogenic shock    Coagulation defect, unspecified  See Cardiogenic shock    Seizure disorder  Patient with previous complex partial seizures (controlled over last 2 years on keppra). Patient's ongoing AMS and drowsiness may be secondary to seizures/status. Possible viral infection or other process contributing to her cardiogenic shock and ensuing liver and kidney injuries may have lowered seizure threshold    Recommendations:  --24h EEG; will follow up  -- Continue home keppra dosing  -- Will continue to follow with additional recs     Acute respiratory failure  Patient currently on ventilator and intubated. Most recent BG with improving acidemia and hypercarbia.     Continued management per primary team        STROKE DOCUMENTATION          NIH Scale:        Modified Bryan    Zoya Coma Scale:    ABCD2 Score:    [id number] Score:   HAS -BLED  Score:   ICH Score:   [employer] Classification:      VTE Risk Mitigation (From admission, onward)         Ordered     IP VTE HIGH RISK PATIENT  Once      06/23/20 2146     Place sequential compression device  Until discontinued      06/23/20 2146     Place sequential compression device  Until discontinued      06/23/20 2057                Thank you for your consult. I will follow-up with patient. Please contact us if you have any additional questions.    Suleman Chaudhry MD  Neurology  [facility]

## 2020-06-24 NOTE — SUBJECTIVE & OBJECTIVE
Past Medical History:   Diagnosis Date    Intellectual disability     Seizures     Strabismus        Past Surgical History:   Procedure Laterality Date    STRABISMUS SURGERY  9/99    BMR recession OU .5MM    STRABISMUS SURGERY  7/2001    BSO tenotomy/LLR recession 4mm    STRABISMUS SURGERY  5/2005    RLR recession 6mm/ IO myectomy OU        Review of patient's allergies indicates:  No Known Allergies  Current Facility-Administered Medications   Medication Frequency    chlorhexidine 0.12 % solution 15 mL BID    dexmedetomidine (PRECEDEX) 400mcg/100mL 0.9% NaCL infusion Continuous    dextrose 10% (D10W) Bolus PRN    DOBUTamine 500mg in D5W 250mL infusion (premix) (NON-TITRATING) Continuous    EPINEPHrine (ADRENALIN) 5 mg in sodium chloride 0.9% 250 mL infusion Continuous    [START ON 6/25/2020] famotidine (PF) injection 20 mg QHS    furosemide (LASIX) 500 mg infusion (conc: 10 mg/mL) Continuous    glucagon (human recombinant) injection 1 mg PRN    hydrALAZINE tablet 10 mg Q8H    isosorbide dinitrate tablet 10 mg TID    levETIRAcetam (KEPPRA) 750 mg in dextrose 5 % 100 mL IVPB Q12H    sodium chloride 0.9% flush 10 mL PRN    sodium chloride 0.9% flush 10 mL PRN     Family History     Problem Relation (Age of Onset)    Amblyopia Paternal Aunt    Cancer Maternal Grandfather    Cataracts Maternal Grandmother    Hypertension Father, Maternal Grandmother, Paternal Grandmother    Ovarian cancer Maternal Aunt    Strabismus Father, Paternal Aunt    Thyroid disease Paternal Grandfather        Tobacco Use    Smoking status: Never Smoker    Smokeless tobacco: Never Used   Substance and Sexual Activity    Alcohol use: No    Drug use: No    Sexual activity: Never     Birth control/protection: OCP     Comment: Seasonale     Review of Systems   Unable to perform ROS: Acuity of condition     Objective:     Vital Signs (Most Recent):  Temp: 97.5 °F (36.4 °C) (06/24/20 1101)  Pulse: (!) 130 (06/24/20 1101)  Resp:  15 (06/24/20 1101)  BP: 113/64 (06/24/20 1101)  SpO2: 98 % (06/24/20 1101)  O2 Device (Oxygen Therapy): ventilator (06/24/20 0756) Vital Signs (24h Range):  Temp:  [97.5 °F (36.4 °C)-100.9 °F (38.3 °C)] 97.5 °F (36.4 °C)  Pulse:  [121-133] 130  Resp:  [12-18] 15  SpO2:  [96 %-100 %] 98 %  BP: ()/(49-69) 113/64  Arterial Line BP: ()/(59-82) 119/71     Weight: 70.3 kg (155 lb) (06/24/20 1000)  Body mass index is 31.31 kg/m².  Body surface area is 1.71 meters squared.    I/O last 3 completed shifts:  In: 511.8 [I.V.:161.8; IV Piggyback:350]  Out: -     Physical Exam  Vitals signs and nursing note reviewed.   Constitutional:       Appearance: She is well-developed. She is ill-appearing. She is not diaphoretic.      Interventions: She is sedated and intubated.   HENT:      Head: Atraumatic.      Mouth/Throat:      Mouth: Mucous membranes are moist.   Eyes:      Extraocular Movements: Extraocular movements intact.      Conjunctiva/sclera: Conjunctivae normal.   Cardiovascular:      Rate and Rhythm: Regular rhythm. Tachycardia present.   Pulmonary:      Effort: She is intubated.      Breath sounds: No wheezing or rales.   Abdominal:      General: There is distension.      Palpations: Abdomen is soft.   Musculoskeletal:         General: Swelling present. No signs of injury.      Right lower leg: Edema present.      Left lower leg: Edema present.   Skin:     General: Skin is warm and dry.      Coloration: Skin is not jaundiced.      Findings: No rash.         Significant Labs:  ABGs:   Recent Labs   Lab 06/24/20  0756   PH 7.359   PCO2 38.8   HCO3 21.9*   POCSATURATED 97   BE -4     CBC:   Recent Labs   Lab 06/24/20  0340   WBC 9.11   RBC 4.08   HGB 10.8*   HCT 36.7*   PLT 94*   MCV 90   MCH 26.5*   MCHC 29.4*     CMP:   Recent Labs   Lab 06/24/20  0340   *  155*   CALCIUM 10.5  10.5   ALBUMIN 3.6   PROT 7.5     138   K 5.2*  5.2*   CO2 19*  19*     106   BUN 64*  64*   CREATININE 2.1*   2.1*   ALKPHOS 35*   *   AST 60*   BILITOT 3.8*

## 2020-06-24 NOTE — ASSESSMENT & PLAN NOTE
- DDx could be stress related or sepsis, she was on antibiotics for the last 4 days with no reported growth on her culture   - Vanc Trop was supratheraputic prior arrival will repeat in the AM

## 2020-06-24 NOTE — PROGRESS NOTES
VANCOMYCIN DOSING BY PHARMACY DISCONTINUATION NOTE    Yudy Jauregui is a 19 y.o. female who had been consulted for vancomycin dosing.    The pharmacy consult for vancomycin dosing has been discontinued.     Vancomycin Dosing by Pharmacy Consult will sign-off. Please reconsult if necessary. Thank you for allowing us to participate in this patient's care.       Tahmina Franklin, PharmD  [id number]

## 2020-06-24 NOTE — ASSESSMENT & PLAN NOTE
Non-Oliguric CHRISTIAN on Unknown Baseline Renal Function    20 yo female with PMHx of seizure disorder, ADHD, and developmental delays who presents as a transfer from OSH for higher level of care. Originally presented to ED with CC of SOB, fatigue and overall not feeling well.  Found to have a SCr of 1.6, elevated liver enzymes and tachycardia.  She underwent CTA (negative for PE) but found to have cardiomegally, pericardial effusion, pleural effusions and ascites and was later transfer to [facility], where she was found to be in cardiogenic shock.  She was transferred to [facility] for higher level of care and Nephrology was consulted for worsening kidney function.    CHRISTIAN thought to be 2/2 Acute tubular injury from cardiogenic shock  Possible component of NOHELIA from CTA obtained on admission.    Doesn't appear to have RPGN based on available labs.    6/24:  Urine Microscopy with WBC, RBC (non-dysmorphic) hyaline casts, fine/coarse granular casts     Plan/Recommendations:  -Urinalysis  -UPCR  -urine lytes  -renal US  -urine microscopy  -continue lasix gtt  -add diuril 500 mg IVPB as needed for sequential nephron blockade  -continue strict I/O'  -renal tube feeds when appropriate  -no acute need for RRT at this time, will continue monitoring for now

## 2020-06-24 NOTE — SUBJECTIVE & OBJECTIVE
Past Medical History:   Diagnosis Date    Intellectual disability     Seizures     Strabismus        Past Surgical History:   Procedure Laterality Date    STRABISMUS SURGERY  9/99    BMR recession OU .5MM    STRABISMUS SURGERY  7/2001    BSO tenotomy/LLR recession 4mm    STRABISMUS SURGERY  5/2005    RLR recession 6mm/ IO myectomy OU        Review of patient's allergies indicates:  No Known Allergies      No current facility-administered medications on file prior to encounter.      Current Outpatient Medications on File Prior to Encounter   Medication Sig    amitriptyline (ELAVIL) 75 MG tablet TK 1 T PO QHS    clonidine (CATAPRES) 0.1 MG tablet Take 0.1 mg by mouth 2 (two) times daily.    diazePAM 5-7.5-10 mg (DIASTAT ACUDIAL) 5-7.5-10 mg Kit Place 1 each (10 mg total) rectally as needed (seizure > 5 mins).    INTROVALE 0.15 mg-30 mcg (91) per tablet TAKE 1 TABLET BY MOUTH EVERY DAY    levETIRAcetam (KEPPRA) 750 MG Tab Take 1 tablet (750 mg total) by mouth 2 (two) times daily.    levonorgestrel-ethinyl estradiol (INTROVALE) 0.15 mg-30 mcg (91) per tablet Take 1 tablet by mouth once daily.    VYVANSE 50 mg capsule Take 50 mg by mouth once daily.     Family History     Problem Relation (Age of Onset)    Amblyopia Paternal Aunt    Cancer Maternal Grandfather    Cataracts Maternal Grandmother    Hypertension Father, Maternal Grandmother, Paternal Grandmother    Ovarian cancer Maternal Aunt    Strabismus Father, Paternal Aunt    Thyroid disease Paternal Grandfather        Tobacco Use    Smoking status: Never Smoker    Smokeless tobacco: Never Used   Substance and Sexual Activity    Alcohol use: No    Drug use: No    Sexual activity: Never     Birth control/protection: OCP     Comment: Seasonale     Review of Systems   Unable to perform ROS: Mental status change   Constitutional: Positive for fatigue. Negative for chills, diaphoresis and fever.   HENT: Negative for rhinorrhea and sneezing.    Eyes:  Negative for discharge and redness.   Respiratory: Positive for shortness of breath (verntilator). Negative for cough and wheezing.    Cardiovascular: Negative for leg swelling.   Gastrointestinal: Negative for diarrhea and vomiting.   Skin: Negative for pallor and rash.   Neurological: Positive for speech difficulty and weakness. Negative for tremors and facial asymmetry.   Psychiatric/Behavioral: Positive for confusion and decreased concentration.     Objective:     Vital Signs (Most Recent):  Temp: 97.5 °F (36.4 °C) (06/24/20 1101)  Pulse: (!) 133 (06/24/20 1300)  Resp: 17 (06/24/20 1300)  BP: 108/62 (06/24/20 1300)  SpO2: 96 % (06/24/20 1300) Vital Signs (24h Range):  Temp:  [97.5 °F (36.4 °C)-100.9 °F (38.3 °C)] 97.5 °F (36.4 °C)  Pulse:  [121-134] 133  Resp:  [12-20] 17  SpO2:  [95 %-100 %] 96 %  BP: ()/(49-69) 108/62  Arterial Line BP: ()/(59-82) 123/73     Weight: 70.3 kg (155 lb)  Body mass index is 31.31 kg/m².    Physical Exam  Constitutional:       Comments: Opens eyes to command   HENT:      Head:      Comments: intubated     Nose: Nose normal.   Eyes:      General: No scleral icterus.     Conjunctiva/sclera: Conjunctivae normal.      Comments: Constricted pupils. Sluggish reactivity to light without consensual response on other eye. No EOM when prompted   Cardiovascular:      Pulses: Normal pulses.      Heart sounds: No murmur.      Comments: Intermittent tachycardia  Pulmonary:      Effort: Respiratory distress present.      Breath sounds: No wheezing or rales.      Comments: Intubated on ventilator   Abdominal:      General: Abdomen is flat. There is distension.      Tenderness: There is no abdominal tenderness.   Musculoskeletal:         General: No swelling.      Comments: Decreased ROM 4/4   Skin:     General: Skin is warm.      Coloration: Skin is not jaundiced.      Findings: No bruising.   Neurological:      Cranial Nerves: Cranial nerve deficit present.      Sensory: Sensory  deficit present.      Motor: Weakness present.      Deep Tendon Reflexes: Reflexes abnormal (upgoing plantar reflex).      Reflex Scores:       Bicep reflexes are 2+ on the right side and 2+ on the left side.       Brachioradialis reflexes are 2+ on the right side and 2+ on the left side.       Patellar reflexes are 2+ on the right side and 2+ on the left side.     Comments: Not withdrawing from wain  No facial grimace         NEUROLOGICAL EXAMINATION:     MENTAL STATUS   Oriented to person.   Oriented to place.   Oriented to time.   Speech: (Intubated)  Level of consciousness: opens eyes to command. does not follow other commands.    CRANIAL NERVES     CN III, IV, VI   Pupils: pinpoint  Right pupil: Size: 2 mm. Shape: regular. Reactivity: sluggish. Accommodation: absent.   Left pupil: Size: 2 mm. Shape: regular. Reactivity: sluggish. Accommodation: absent.   CN III: bilateral CN III palsy  CN VI: bilateral CN VI palsy    CN V   Right facial sensation deficit: complete  Left facial sensation deficit: complete  Right corneal reflex: abnormal  Left corneal reflex: abnormal    CN IX, X   Right gag reflex: normal  Left gag reflex: normal    MOTOR EXAM   Muscle bulk: decreased  Overall muscle tone: decreased    REFLEXES     Reflexes   Right brachioradialis: 2+  Left brachioradialis: 2+  Right biceps: 2+  Left biceps: 2+  Right patellar: 2+  Left patellar: 2+  Right plantar: upgoing  Left plantar: upgoing  Right ankle clonus: absent  Left ankle clonus: absent    SENSORY EXAM        No weithdrawal from pain. Patient currently obtunded.       Significant Labs:   Recent Lab Results       06/24/20  1313   06/24/20  1036   06/24/20  1013   06/24/20  0756   06/24/20  0752        Albumin               Alkaline Phosphatase               Allens Test       N/A N/A     ALT               Amorphous, UA               Anion Gap               Ao peak hieu     0.81         Ao VTI     8.98         Appearance, UA               aPTT                AST               AV valve area     1.87         AV mean gradient     2         AV index (prosthetic)     0.61         AV peak gradient     3         AV Velocity Ratio     0.67         Bacteria, UA               Baso #               Basophil%               Bilirubin (UA)               BILIRUBIN TOTAL               BNP               Site       Constance/UAC Other     BSA     1.71         BUN, Bld               Calcium               Calcium, Ion               Chloride               CO2               Color, UA               Creatinine               Creatinine, Random Ur   27.0  Comment:  The random urine reference ranges provided were established   for 24 hour urine collections.  No reference ranges exist for  random urine specimens.  Correlate clinically.             Left Ventricle Relative Wall Thickness     0.21         DelSys       Adult Vent Adult Vent     Differential Method               E/E' ratio     14.14         eGFR if                eGFR if non                Eos #               Eosinophil%               Factor VIII Activity               Ferritin               Fibrinogen               FiO2       50 50     FS     2         Glucose               Glucose, UA               Gran # (ANC)               Gran%               Group & Rh               Hematocrit               Hemoglobin               Hyaline Casts, UA               Immature Grans (Abs)               Immature Granulocytes               INDIRECT ROSE               INR               IVS     0.61         Ketones, UA               LA WIDTH     3.90         Lactate, Ryan               Left Atrium Major Axis     5.77         Left Atrium Minor Axis     5.54         LA size     3.95         LA volume     74.02         LA Volume Index     44.7         LVOT area     3.1         Leukocytes, UA               Lipase               LV LATERAL E/E' RATIO     11.00         LV SEPTAL E/E' RATIO     19.80         LV Diastolic Volume      194.73         LV Diastolic Volume Index     117.68         LVIDD     6.50         LVIDS     6.40         LV mass     165.42         LV Mass Index     100         LVOT diameter     1.98         LVOT peak oz     0.54         LVOT stroke volume     16.80         LVOT peak VTI     5.46         LV Systolic Volume     175.18         LV Systolic Volume Index     105.9         Lymph #               Lymph%               Magnesium               MCH               MCHC               MCV               Mean e'     0.07         Microscopic Comment               Min Vol       5.31 5.98     Miscellaneous Test Name [facility] [id number]             Mode       AC/PRVC AC/PRVC     Mono #               Mono%               MPV               MV Peak E Oz     0.99         NITRITE UA               nRBC               Occult Blood UA               PEEP       8 8     pH, UA               Phosphorus               PiP       11 12     Platelets               POC BE       -4 -2     POC HCO3       21.9 24.2     POC PCO2       38.8 47.5     POC PH       7.359 7.316     POC PO2       92 37     POC SATURATED O2       97 64     POC TCO2       23 26     POCT Glucose               Potassium               Prot/Creat Ratio, Ur   0.59           PROTEIN TOTAL               Protein, UA               Protein, Urine Random   16  Comment:  The random urine reference ranges provided were established   for 24 hour urine collections.  No reference ranges exist for  random urine specimens.  Correlate clinically.             Protime               PW     0.68         RA Major Axis     5.01         Rate       14 14     RA Width     3.30         RBC               RBC, UA               RDW               RV S'     7.87         RVDD     4.10         Sample       ARTERIAL VENOUS     Sinus     3.18         Sodium               Sodium Urine Random   53  Comment:  The random urine reference ranges provided were established   for 24 hour urine collections.  No reference ranges  exist for  random urine specimens.  Correlate clinically.             Sp02       98 97     Specific [place], UA               Specimen UA               Squam Epithel, UA               TAPSE     1.03         TDI SEPTAL     0.05         TDI LATERAL     0.09         Triscuspid Valve Regurgitation Peak Gradient     17         TR Max Oz     2.04         Vancomycin, Random               Vt       300 300     WBC, UA               WBC                                06/24/20  0752   06/24/20  0726   06/24/20  0340   06/23/20  2157   06/23/20  2125        Albumin     3.6         Alkaline Phosphatase     35         Allens Test               ALT     130         Amorphous, UA   Occasional           Anion Gap     13              13         Ao peak oz               Ao VTI               Appearance, UA   Cloudy           aPTT     25.2  Comment:  aPTT therapeutic range = 39-69 seconds         AST     60         AV valve area               AV mean gradient               AV index (prosthetic)               AV peak gradient               AV Velocity Ratio               Bacteria, UA   Few           Baso #     0.00         Basophil%     0.0         Bilirubin (UA)   Negative           BILIRUBIN TOTAL     3.8  Comment:  For infants and newborns, interpretation of results should be based  on gestational age, weight and in agreement with clinical  observations.  Premature Infant recommended reference ranges:  Up to 24 hours.............<8.0 mg/dL  Up to 48 hours............<12.0 mg/dL  3-5 days..................<15.0 mg/dL  6-29 days.................<15.0 mg/dL           BNP               Site               BSA               BUN, Bld     64              64         Calcium     10.5              10.5         Calcium, Ion               Chloride     106              106         CO2     19              19         Color, UA   Gifty           Creatinine     2.1              2.1         Creatinine, Random Ur               Left Ventricle Relative  Wall Thickness               DelSys               Differential Method     Automated         E/E' ratio               eGFR if      37.9              37.9         eGFR if non      32.9  Comment:  Calculation used to obtain the estimated glomerular filtration  rate (eGFR) is the CKD-EPI equation.                 32.9  Comment:  Calculation used to obtain the estimated glomerular filtration  rate (eGFR) is the CKD-EPI equation.            Eos #     0.0         Eosinophil%     0.0         Factor VIII Activity               Ferritin               Fibrinogen               FiO2               FS               Glucose     155              155         Glucose, UA   Negative           Gran # (ANC)     8.0         Gran%     87.7         Group & Rh         A POS     Hematocrit     36.7         Hemoglobin     10.8         Hyaline Casts, UA   24           Immature Grans (Abs)     0.04  Comment:  Mild elevation in immature granulocytes is non specific and   can be seen in a variety of conditions including stress response,   acute inflammation, trauma and pregnancy. Correlation with other   laboratory and clinical findings is essential.           Immature Granulocytes     0.4         INDIRECT ROSE         NEG     INR     1.5  Comment:  Coumadin Therapy:  2.0 - 3.0 for INR for all indicators except mechanical heart valves  and antiphospholipid syndromes which should use 2.5 - 3.5.           IVS               Ketones, UA   Negative           LA WIDTH               Lactate, Ryan               Left Atrium Major Axis               Left Atrium Minor Axis               LA size               LA volume               LA Volume Index               LVOT area               Leukocytes, UA   Negative           Lipase               LV LATERAL E/E' RATIO               LV SEPTAL E/E' RATIO               LV Diastolic Volume               LV Diastolic Volume Index               LVIDD               LVIDS               LV  mass               LV Mass Index               LVOT diameter               LVOT peak oz               LVOT stroke volume               LVOT peak VTI               LV Systolic Volume               LV Systolic Volume Index               Lymph #     0.4         Lymph%     4.0         Magnesium     2.2         MCH     26.5         MCHC     29.4         MCV     90         Mean e'               Microscopic Comment   SEE COMMENT  Comment:  Other formed elements not mentioned in the report are not   present in the microscopic examination.              Min Vol               Miscellaneous Test Name               Mode               Mono #     0.7         Mono%     7.9         MPV     11.7         MV Peak E Oz               NITRITE UA   Negative           nRBC     1         Occult Blood UA   2+           PEEP               pH, UA   5.0           Phosphorus     5.2         PiP               Platelets     94         POC BE               POC HCO3               POC PCO2               POC PH               POC PO2               POC SATURATED O2               POC TCO2               POCT Glucose 169     167       Potassium     5.2              5.2         Prot/Creat Ratio, Ur               PROTEIN TOTAL     7.5         Protein, UA   1+  Comment:  Recommend a 24 hour urine protein or a urine   protein/creatinine ratio if globulin induced proteinuria is  clinically suspected.             Protein, Urine Random               Protime     14.8         PW               RA Major Axis               Rate               RA Width               RBC     4.08         RBC, UA   6           RDW     16.4         RV S'               RVDD               Sample               Sinus               Sodium     138              138         Sodium Urine Random               Sp02               Specific [place], UA   1.010           Specimen UA   Urine, Catheterized           Squam Epithel, UA   0           TAPSE               TDI SEPTAL               TDI LATERAL                Triscuspid Valve Regurgitation Peak Gradient               TR Max Oz               Vancomycin, Random     20.4         Vt               WBC, UA   3           WBC     9.11                          06/23/20  2114   06/23/20  2100   06/23/20 2059        Albumin   3.9          3.9       Alkaline Phosphatase   38          38       Allens Test N/A         ALT   139          139       Amorphous, UA           Anion Gap   14          14       Ao peak oz           Ao VTI           Appearance, UA           aPTT     24.0  Comment:  aPTT therapeutic range = 39-69 seconds     AST   64          64       AV valve area           AV mean gradient           AV index (prosthetic)           AV peak gradient           AV Velocity Ratio           Bacteria, UA           Baso #     0.00     Basophil%     0.0     Bilirubin (UA)           BILIRUBIN TOTAL   4.1  Comment:  For infants and newborns, interpretation of results should be based  on gestational age, weight and in agreement with clinical  observations.  Premature Infant recommended reference ranges:  Up to 24 hours.............<8.0 mg/dL  Up to 48 hours............<12.0 mg/dL  3-5 days..................<15.0 mg/dL  6-29 days.................<15.0 mg/dL            4.1  Comment:  For infants and newborns, interpretation of results should be based  on gestational age, weight and in agreement with clinical  observations.  Premature Infant recommended reference ranges:  Up to 24 hours.............<8.0 mg/dL  Up to 48 hours............<12.0 mg/dL  3-5 days..................<15.0 mg/dL  6-29 days.................<15.0 mg/dL         BNP     >4,900  Comment:  Values of less than 100 pg/ml are consistent with non-CHF populations.     Site Other         BSA           BUN, Bld   56          56       Calcium   11.1          11.1       Calcium, Ion     1.36     Chloride   103          103       CO2   21          21       Color, UA           Creatinine   1.8          1.8        Creatinine, Random Ur           Left Ventricle Relative Wall Thickness           DelSys Adult Vent         Differential Method     Automated     E/E' ratio           eGFR if    45.7          45.7       eGFR if non    39.7  Comment:  Calculation used to obtain the estimated glomerular filtration  rate (eGFR) is the CKD-EPI equation.             39.7  Comment:  Calculation used to obtain the estimated glomerular filtration  rate (eGFR) is the CKD-EPI equation.          Eos #     0.0     Eosinophil%     0.0     Factor VIII Activity     226     Ferritin   235       Fibrinogen     255     FiO2           FS           Glucose   173          173       Glucose, UA           Gran # (ANC)     8.4     Gran%     89.4     Group & Rh           Hematocrit     35.1     Hemoglobin     10.9     Hyaline Casts, UA           Immature Grans (Abs)     0.04  Comment:  Mild elevation in immature granulocytes is non specific and   can be seen in a variety of conditions including stress response,   acute inflammation, trauma and pregnancy. Correlation with other   laboratory and clinical findings is essential.       Immature Granulocytes     0.4     INDIRECT ROSE           INR     1.5  Comment:  Coumadin Therapy:  2.0 - 3.0 for INR for all indicators except mechanical heart valves  and antiphospholipid syndromes which should use 2.5 - 3.5.       IVS           Ketones, UA           LA WIDTH           Lactate, Ryan     2.0  Comment:  Falsely low lactic acid results can be found in samples   containing >=13.0 mg/dL total bilirubin and/or >=3.5 mg/dL   direct bilirubin.       Left Atrium Major Axis           Left Atrium Minor Axis           LA size           LA volume           LA Volume Index           LVOT area           Leukocytes, UA           Lipase   74       LV LATERAL E/E' RATIO           LV SEPTAL E/E' RATIO           LV Diastolic Volume           LV Diastolic Volume Index           LVIDD           LVIDS            LV mass           LV Mass Index           LVOT diameter           LVOT peak oz           LVOT stroke volume           LVOT peak VTI           LV Systolic Volume           LV Systolic Volume Index           Lymph #     0.4     Lymph%     4.3     Magnesium   2.1          2.1       MCH     27.4     MCHC     31.1     MCV     88     Mean e'           Microscopic Comment           Min Vol           Miscellaneous Test Name           Mode AC/PRVC         Mono #     0.6     Mono%     5.9     MPV     11.7     MV Peak E Oz           NITRITE UA           nRBC     0     Occult Blood UA           PEEP           pH, UA           Phosphorus   4.5          4.5       PiP           Platelets     98     POC BE -1         POC HCO3 25.3         POC PCO2 48.9         POC PH 7.322         POC PO2 37         POC SATURATED O2 65         POC TCO2 27         POCT Glucose           Potassium   3.5          3.5       Prot/Creat Ratio, Ur           PROTEIN TOTAL   7.8          7.8       Protein, UA           Protein, Urine Random           Protime     14.7     PW           RA Major Axis           Rate           RA Width           RBC     3.98     RBC, UA           RDW     16.0     RV S'           RVDD           Sample VENOUS         Sinus           Sodium   138          138       Sodium Urine Random           Sp02           Specific Gravity, UA           Specimen UA           Squam Epithel, UA           TAPSE           TDI SEPTAL           TDI LATERAL           Triscuspid Valve Regurgitation Peak Gradient           TR Max Oz           Vancomycin, Random           Vt           WBC, UA           WBC     9.39         All pertinent lab results from the past 24 hours have been reviewed.    Significant Imaging: I have reviewed all pertinent imaging results/findings within the past 24 hours.

## 2020-06-24 NOTE — ASSESSMENT & PLAN NOTE
- BUN/creatinine on admit 64/2.1  - Was anuric overnight, but UOP is now  cc/hr  - Renal US  - Nephrology consulted

## 2020-06-24 NOTE — H&P
[facility]  Heart Transplant  H&P    Patient Name: Yudy Jauregui  MRN: [id number]  Admission Date: 2020  Attending Physician: Kaitlin Gamboa MD  Primary Care Provider: Niurka Sinclair MD  Principal Problem:Cardiogenic shock    Subjective:     History of Present Illness:  Ms. Yudy Jauregui is a 19 y.o.female with prior history of focal epilepsy with complex partial seizures, developmental delay and ADHD. She presented to [facility] on 2020 with symptoms of shortness of breath, fatigue, and feeling unwell. Further work up showed that she was in cardiogenic shock. Further work up showed transaminitis, acute kidney injury and acute hypoxic respiratory failure requiring mechanical ventilation. During her stay at [facility] and [facility], she spiked fever 100.8 and continued on inotrope support (see below). She was on Lasix and Albumin and Diuril which was held secondary to elevated urine output for the last lasts. He ventilation setting improved and her FiO2 decrease to medium setting oxygenations. She was on solumedrol and received IVIG 100g as well. Echo noted to have mod mitral valve insufficiency, mild tricuspid valve insufficiency, moderate LV dilation, decreased RV and LV systolic function, small pericardial effusion and bilateral pleural effusion; EF ~20%. Repeat echo with similar results. On arrival she was on multiple infusions including Milrinone Infusion 0.5 mcg/kg/min, Heparin 1 unit/mL, Epi 0.03 mcg/kg/min, and sedated with Fentanyl 100 mcg/hr and dexmedetomidine 0.5 mcg/kg/hr.     Ped History:  Born full term and born with    She developed RSV as two weeks old  Developmental delay and interllectula diability and ADHD and she was on different medications     Family History:  No first degree relative with heart disease   She has a a sibling sister who passed at age of 12 months with developmental dealy related to the brain (not  to heart).     Past Medical History:   Diagnosis Date    Intellectual disability     Seizures     Strabismus        Past Surgical History:   Procedure Laterality Date    STRABISMUS SURGERY  9/99    BMR recession OU .5MM    STRABISMUS SURGERY  7/2001    BSO tenotomy/LLR recession 4mm    STRABISMUS SURGERY  5/2005    RLR recession 6mm/ IO myectomy OU        Review of patient's allergies indicates:  No Known Allergies    Current Facility-Administered Medications   Medication    chlorhexidine 0.12 % solution 15 mL    dexmedetomidine (PRECEDEX) 400mcg/100mL 0.9% NaCL infusion    dextrose 10% (D10W) Bolus    EPINEPHrine (ADRENALIN) 5 mg in sodium chloride 0.9% 250 mL infusion    famotidine (PF) injection 20 mg    fentaNYL 2500 mcg in 0.9% sodium chloride 250 mL infusion premix (titrating)    glucagon (human recombinant) injection 1 mg    levETIRAcetam (KEPPRA) 750 mg in dextrose 5 % 100 mL IVPB    milrinone 20mg in D5W 100 mL infusion    sodium chloride 0.9% flush 10 mL    sodium chloride 0.9% flush 10 mL     Family History     Problem Relation (Age of Onset)    Amblyopia Paternal Aunt    Cancer Maternal Grandfather    Cataracts Maternal Grandmother    Hypertension Father, Maternal Grandmother, Paternal Grandmother    Ovarian cancer Maternal Aunt    Strabismus Father, Paternal Aunt    Thyroid disease Paternal Grandfather        Tobacco Use    Smoking status: Never Smoker    Smokeless tobacco: Never Used   Substance and Sexual Activity    Alcohol use: No    Drug use: No    Sexual activity: Never     Birth control/protection: OCP     Comment: Seasonale     Review of Systems   Unable to perform ROS: Intubated     Objective:     Vital Signs (Most Recent):  Temp: 98.1 °F (36.7 °C) (06/23/20 2100)  Pulse: (!) 128 (06/23/20 2100)  Resp: 17 (06/23/20 2100)  BP: (!) 106/49 (06/23/20 2100)  SpO2: 97 % (06/23/20 2100) Vital Signs (24h Range):  Temp:  [98.1 °F (36.7 °C)-100.9 °F (38.3 °C)] 98.1 °F (36.7  °C)  Pulse:  [128-130] 128  Resp:  [14-17] 17  SpO2:  [96 %-97 %] 97 %  BP: (106-112)/(49-67) 106/49  Arterial Line BP: (110)/(79) 110/79     Patient Vitals for the past 72 hrs (Last 3 readings):   Weight   06/23/20 2100 70.7 kg (155 lb 13.8 oz)     Body mass index is 31.48 kg/m².    No intake or output data in the 24 hours ending 06/23/20 2207    Physical Exam  Vitals signs reviewed.   Eyes:      General:         Right eye: No discharge.         Left eye: No discharge.   Cardiovascular:      Rate and Rhythm: Regular rhythm. Tachycardia present.      Heart sounds: Murmur present. Gallop present.    Pulmonary:      Breath sounds: Rales present. No wheezing.      Comments: Vent Mode: A/C  Oxygen Concentration (%):  (50-97) 50  Resp Rate Total:  (15 br/min-18 br/min) 18 br/min  Vt Set:  (300 mL) 300 mL  PEEP/CPAP:  (8 cmH20) 8 cmH20  Mean Airway Pressure:  (11 cmH20) 11 cmH20    Abdominal:      General: Abdomen is flat. There is no distension.   Musculoskeletal:         General: No swelling.   Skin:     General: Skin is warm.      Coloration: Skin is not jaundiced or pale.   Neurological:      Cranial Nerves: No cranial nerve deficit.         Significant Labs:  CBC:  Recent Labs   Lab 06/23/20 2059   WBC 9.39   RBC 3.98*   HGB 10.9*   HCT 35.1*   PLT 98*   MCV 88   MCH 27.4   MCHC 31.1*     Coagulation:   Recent Labs   Lab 06/23/20 2059   INR 1.5*   APTT 24.0     LDH:  No results for input(s): LDH in the last 72 hours.  Microbiology:  Microbiology Results (last 7 days)     ** No results found for the last 168 hours. **          I have reviewed all pertinent labs within the past 24 hours.    Diagnostic Results:  I have reviewed all pertinent imaging results/findings within the past 24 hours.  I have reviewed and interpreted all pertinent imaging results/findings within the past 24 hours.    Assessment/Plan:     * Cardiogenic shock  - Echo prior arrival showed Mod MR, Mild TR, Moderate LV dilation, decreased RV and LV  systolic function, small pericardial effusion and bilateral pleural effusion; EF ~20%. Repeat echo with similar results. Plan to repeat Echo here.   - The etiology of her cardiogenic shock could be viral myocarditis associated with multi-organ dysfunction   - Continue current inotrope:  - Milrinone 0.5 mcg/kg/min - will consider switch to  depend on kidney function   - Epinephrine ggt at 0.03mcg/kg/min, goal MAP >65  - Diuretics based on her CVP    - Lasix 80 mg IV x1 followed by 20 mg/hr and repeat HD in 4 hours   - Repeated bedside Echo showed EF ~ 20%, mod MR, mild TR and moderately impaired RV function   - CVP 30, SvO2 65, CI 2.76, CO 4.64 and      Coagulation defect, unspecified  - During her stay in [facility],Hematology/oncology consulted to aid in coagulopathy.   - Liver US with doppler significant for hepatomegaly with fatty liver.   - s/p vitamin K x2.  - Their impression is more concerning about acute liver dysfunction more than DIC.   - Send for Factor VIII    - Plasma concentration of factor VIII is not decreased with liver disease  - Daily PT/INR, aPTT, fibrinogen, CBC + diff   - Goal platelet >10-20. Consider transfusing pre-procedure     Leukocytosis  - DDx could be stress related or sepsis, she was on antibiotics for the last 4 days with no reported growth on her culture   - Vanc Trop was supratheraputic prior arrival will repeat in the AM     Seizure disorder  - Currently sedated on Precedex will try to wean off Fentanyl   - Continue her Keppra at her home dose 750 mg BID     Pericardial effusion  - Mild and no signs of tamponade on repeated bedside Echo   - Obtain TTE in AM     ADHD  - Hold Lisdexamfetamine to avoid counteraction with sedative medications     Acute respiratory failure  - Current on A/C ventilation with RR 14, Vt 300, PEEP 8, FiO2 50%  - Weaning for goal of Vt 270 (6 ml/kg ideal body weight)  Vent Mode: A/C  Oxygen Concentration (%):  [50-97] 50  Resp Rate Total:  [15 br/min-18  br/min] 18 br/min  Vt Set:  [300 mL] 300 mL  PEEP/CPAP:  [8 cmH20] 8 cmH20  Mean Airway Pressure:  [11 cmH20] 11 cmH20        Yvon Castro MD  Heart Transplant  [facility]

## 2020-06-24 NOTE — CONSULTS
[facility]  Nephrology  Consult Note    Patient Name: Yudy Jauregui  MRN: [id number]  Admission Date: 6/23/2020  Hospital Length of Stay: 1 days  Attending Provider: Kaitlin Gamboa MD   Primary Care Physician: Niurka Sinclair MD  Principal Problem:Cardiogenic shock    Inpatient consult to Nephrology  Consult performed by: Murphy Borja NP  Consult ordered by: Yvon Castro MD  Reason for consult: CHRISTIAN        Subjective:     HPI: Yudy Jauregui is a 20 yo female with PMHx of seizure disorder, ADHD, and developmental delays who presented to [facility] on 6/19 with chief complaint of SOB, fatigue, and overall not feeling well.  Initial labs notable for transaminitis (/), presumed CHRISTIAN with a SCr of 1.6, metabolic acidosis with bicarb of 16.  She was not hypoxic at the time, vitals notable for sinus tachycardia.  She was tested negative for COVID-19 and underwent CTA of chest to r/o PE.  Results of the scan were negative for PE, but she was found to have cardiomegaly, bilateral pleural effusion, pericardial effusions and ascites and she was transferred to [facility] for higher level of care. No records from [facility] were present at the bedside, information was obtained from primary teams records.  She developed hypoxic respiratory failure and shock, thought to be cardiogenic in nature during her admission to the OSH.  She was started on Epi/Milrinone and diuresed with lasix/albumin and diuril and according to notes, these were discontinued 2/2 polyuria.  She was transferred to [facility] on 6/23 for higher level of care, admitted on Epi and milrinone which was later switched to dobutamine.  Labs on presentation showed a SCr of 1.8 which has increased to 2.1, BNP > 4900 and Nephrology was consulted for anuric CHRISTIAN.  She was given Lasix 80 mg and started on gtt @ 30 mg/hr and UOP had improved to ~ 60 cc/hr.  ECHO on 6/24 read as global hypokinesis with EF < 10%, MR  and PA pressures of 17.  Urinalysis performed on admission showing minimal protein of 30 g and trace blood.    Past Medical History:   Diagnosis Date    Intellectual disability     Seizures     Strabismus        Past Surgical History:   Procedure Laterality Date    STRABISMUS SURGERY  9/99    BMR recession OU .5MM    STRABISMUS SURGERY  7/2001    BSO tenotomy/LLR recession 4mm    STRABISMUS SURGERY  5/2005    RLR recession 6mm/ IO myectomy OU        Review of patient's allergies indicates:  No Known Allergies  Current Facility-Administered Medications   Medication Frequency    chlorhexidine 0.12 % solution 15 mL BID    dexmedetomidine (PRECEDEX) 400mcg/100mL 0.9% NaCL infusion Continuous    dextrose 10% (D10W) Bolus PRN    DOBUTamine 500mg in D5W 250mL infusion (premix) (NON-TITRATING) Continuous    EPINEPHrine (ADRENALIN) 5 mg in sodium chloride 0.9% 250 mL infusion Continuous    [START ON 6/25/2020] famotidine (PF) injection 20 mg QHS    furosemide (LASIX) 500 mg infusion (conc: 10 mg/mL) Continuous    glucagon (human recombinant) injection 1 mg PRN    hydrALAZINE tablet 10 mg Q8H    isosorbide dinitrate tablet 10 mg TID    levETIRAcetam (KEPPRA) 750 mg in dextrose 5 % 100 mL IVPB Q12H    sodium chloride 0.9% flush 10 mL PRN    sodium chloride 0.9% flush 10 mL PRN     Family History     Problem Relation (Age of Onset)    Amblyopia Paternal Aunt    Cancer Maternal Grandfather    Cataracts Maternal Grandmother    Hypertension Father, Maternal Grandmother, Paternal Grandmother    Ovarian cancer Maternal Aunt    Strabismus Father, Paternal Aunt    Thyroid disease Paternal Grandfather        Tobacco Use    Smoking status: Never Smoker    Smokeless tobacco: Never Used   Substance and Sexual Activity    Alcohol use: No    Drug use: No    Sexual activity: Never     Birth control/protection: OCP     Comment: Seasonale     Review of Systems   Unable to perform ROS: Acuity of condition     Objective:      Vital Signs (Most Recent):  Temp: 97.5 °F (36.4 °C) (06/24/20 1101)  Pulse: (!) 130 (06/24/20 1101)  Resp: 15 (06/24/20 1101)  BP: 113/64 (06/24/20 1101)  SpO2: 98 % (06/24/20 1101)  O2 Device (Oxygen Therapy): ventilator (06/24/20 0756) Vital Signs (24h Range):  Temp:  [97.5 °F (36.4 °C)-100.9 °F (38.3 °C)] 97.5 °F (36.4 °C)  Pulse:  [121-133] 130  Resp:  [12-18] 15  SpO2:  [96 %-100 %] 98 %  BP: ()/(49-69) 113/64  Arterial Line BP: ()/(59-82) 119/71     Weight: 70.3 kg (155 lb) (06/24/20 1000)  Body mass index is 31.31 kg/m².  Body surface area is 1.71 meters squared.    I/O last 3 completed shifts:  In: 511.8 [I.V.:161.8; IV Piggyback:350]  Out: -     Physical Exam  Vitals signs and nursing note reviewed.   Constitutional:       Appearance: She is well-developed. She is ill-appearing. She is not diaphoretic.      Interventions: She is sedated and intubated.   HENT:      Head: Atraumatic.      Mouth/Throat:      Mouth: Mucous membranes are moist.   Eyes:      Extraocular Movements: Extraocular movements intact.      Conjunctiva/sclera: Conjunctivae normal.   Cardiovascular:      Rate and Rhythm: Regular rhythm. Tachycardia present.   Pulmonary:      Effort: She is intubated.      Breath sounds: No wheezing or rales.   Abdominal:      General: There is distension.      Palpations: Abdomen is soft.   Musculoskeletal:         General: Swelling present. No signs of injury.      Right lower leg: Edema present.      Left lower leg: Edema present.   Skin:     General: Skin is warm and dry.      Coloration: Skin is not jaundiced.      Findings: No rash.         Significant Labs:  ABGs:   Recent Labs   Lab 06/24/20  0756   PH 7.359   PCO2 38.8   HCO3 21.9*   POCSATURATED 97   BE -4     CBC:   Recent Labs   Lab 06/24/20  0340   WBC 9.11   RBC 4.08   HGB 10.8*   HCT 36.7*   PLT 94*   MCV 90   MCH 26.5*   MCHC 29.4*     CMP:   Recent Labs   Lab 06/24/20  0340   *  155*   CALCIUM 10.5  10.5   ALBUMIN  3.6   PROT 7.5     138   K 5.2*  5.2*   CO2 19*  19*     106   BUN 64*  64*   CREATININE 2.1*  2.1*   ALKPHOS 35*   *   AST 60*   BILITOT 3.8*           Assessment/Plan:     * Cardiogenic shock  -MGMT per primary team    CHRISTIAN (acute kidney injury)  Non-Oliguric CHRISTIAN on Unknown Baseline Renal Function    18 yo female with PMHx of seizure disorder, ADHD, and developmental delays who presents as a transfer from OSH for higher level of care. Originally presented to ED with CC of SOB, fatigue and overall not feeling well.  Found to have a SCr of 1.6, elevated liver enzymes and tachycardia.  She underwent CTA (negative for PE) but found to have cardiomegally, pericardial effusion, pleural effusions and ascites and was later transfer to [facility], where she was found to be in cardiogenic shock.  She was transferred to [facility] for higher level of care and Nephrology was consulted for worsening kidney function.    CHRISTIAN thought to be 2/2 Acute tubular injury from cardiogenic shock  Possible component of NOHELIA from CTA obtained on admission.    Doesn't appear to have RPGN based on available labs.    6/24:  Urine Microscopy with WBC, RBC (non-dysmorphic) hyaline casts, fine/coarse granular casts     Plan/Recommendations:  -Urinalysis  -UPCR  -urine lytes  -renal US  -urine microscopy  -continue lasix gtt  -add diuril 500 mg IVPB as needed for sequential nephron blockade  -continue strict I/O'  -renal tube feeds when appropriate  -no acute need for RRT at this time, will continue monitoring for now      Murphy Wiley NP  Nephrology  [facility]

## 2020-06-24 NOTE — ASSESSMENT & PLAN NOTE
- Current on A/C ventilation with RR 14, Vt 300, PEEP 8, FiO2 40%  - Weaning for goal of Vt 270 (6 ml/kg ideal body weight)  Vent Mode: A/C  Oxygen Concentration (%):  [40-97] 40  Resp Rate Total:  [14 br/min-20 br/min] 20 br/min  Vt Set:  [300 mL] 300 mL  PEEP/CPAP:  [8 cmH20] 8 cmH20  Mean Airway Pressure:  [9 mrY75-54 cmH20] 9.2 cmH20   - CXR on admit c/w CHF

## 2020-06-24 NOTE — ASSESSMENT & PLAN NOTE
- Has been off sedation since ~ 3 am, and remains essentially unresponsive. CTH -ve. Will get 24 hour EEG   - Continue her Keppra at her home dose 750 mg BID   - Neurology consulted   Unremarkable

## 2020-06-24 NOTE — PLAN OF CARE
Recommendations     1. If/when medically feasible, initiate enteral nutrition. Rec'd Isosource 1.5 @ 40 mL/hr to provide 1440 kcals, 65 g of protein, 733 mL fluid.  2. If able to extubate & advance diet, recommend Cardiac diet (texture per SLP).   3. RD to monitor & follow-up.     Goals: Meet % EEN, EPN by RD f/u date  Nutrition Goal Status: new  Communication of RD Recs: reviewed with RN

## 2020-06-24 NOTE — PROCEDURES
"Yudy Jauregui is a 19 y.o. female patient. Neurology was consutled for AMS in the setting of ongoing cardiogenic shock requiring pressors and dobutamine. She remained altered this morning despite being off of sedation since 3am. She has ongoing renal and liver dysfunction in addition to shock and AMS.    Temp: 97.5 °F (36.4 °C) (06/24/20 1101)  Pulse: (!) 131 (06/24/20 1200)  Resp: 15 (06/24/20 1200)  BP: 109/64 (06/24/20 1200)  SpO2: 97 % (06/24/20 1200)  Weight: 70.3 kg (155 lb) (06/24/20 1000)  Height: 4' 11" (149.9 cm) (06/24/20 1000)       Lumbar Puncture    Date/Time: 6/24/2020 12:26 PM  Performed by: Suleman Chaudhry MD  Authorized by: Too Huynh III, MD   Consent Done: Yes  Indications: evaluation for infection and evaluation for altered mental status    Anesthesia:  Local Anesthetic: lidocaine 1% without epinephrine  Lumbar space: L4-L5 interspace  Patient's position: right lateral decubitus  Comments: Unable to obtain CSF during procedure          Suleman Chaudhry  6/24/2020  "

## 2020-06-24 NOTE — ASSESSMENT & PLAN NOTE
- During her stay in [facility],Hematology/oncology consulted to aid in coagulopathy.   - Liver US with doppler significant for hepatomegaly with fatty liver.   - s/p vitamin K x2.  - Their impression is more concerning about acute liver dysfunction more than DIC.   - Send for Factor VIII    - Plasma concentration of factor VIII is not decreased with liver disease  - Daily PT/INR, aPTT, fibrinogen, CBC + diff   - Goal platelet >10-20. Consider transfusing pre-procedure

## 2020-06-24 NOTE — ASSESSMENT & PLAN NOTE
Patient with previous complex partial seizures (controlled over last 2 years on keppra). Patient's ongoing AMS and drowsiness may be secondary to seizures/status. Possible viral infection or other process contributing to her cardiogenic shock and ensuing liver and kidney injuries may have lowered seizure threshold    Recommendations:  --24h EEG; will follow up  -- Continue home keppra dosing  -- Will continue to follow with additional recs

## 2020-06-24 NOTE — HPI
Yudy Jauregui is a 18 yo female with PMHx of seizure disorder, ADHD, and developmental delays who presented to [facility] on 6/19 with chief complaint of SOB, fatigue, and overall not feeling well.  Initial labs notable for transaminitis (/), presumed CHRISTIAN with a SCr of 1.6, metabolic acidosis with bicarb of 16.  She was not hypoxic at the time, vitals notable for sinus tachycardia.  She was tested negative for COVID-19 and underwent CTA of chest to r/o PE.  Results of the scan were negative for PE, but she was found to have cardiomegaly, bilateral pleural effusion, pericardial effusions and ascites and she was transferred to [facility] for higher level of care. No records from [facility] were present at the bedside, information was obtained from primary teams records.  She developed hypoxic respiratory failure and shock, thought to be cardiogenic in nature during her admission to the OSH.  She was started on Epi/Milrinone and diuresed with lasix/albumin and diuril and according to notes, these were discontinued 2/2 polyuria.  She was transferred to [facility] on 6/23 for higher level of care, admitted on Epi and milrinone which was later switched to dobutamine.  Labs on presentation showed a SCr of 1.8 which has increased to 2.1, BNP > 4900 and Nephrology was consulted for anuric CHRISTIAN.  She was given Lasix 80 mg and started on gtt @ 30 mg/hr and UOP had improved to ~ 60 cc/hr.  ECHO on 6/24 read as global hypokinesis with EF < 10%, MR, and PA pressures of 17.  Urinalysis performed on admission showing minimal protein of 30 g and trace blood.

## 2020-06-24 NOTE — ASSESSMENT & PLAN NOTE
- Echo prior arrival showed Mod MR, Mild TR, Moderate LV dilation, decreased RV and LV systolic function, small pericardial effusion and bilateral pleural effusion; EF ~20%. TTE with contrast done this morning here: LVEDD 6.5, LVEF 8%, diastolic dysfunction, RV mildly dilated and mod depressed, mod MR, trace TR and possible LV thrombus. - hyralazine  The etiology of her cardiogenic shock could be viral myocarditis associated with multi-organ dysfunction, her degree of cardiomegaly points to longer standing heart failure and not acute  - CVP 30. Was anuric overnight, but UOP no  cc/hr. Continue Lasix 30 mg/hr and give 250 mg IV Diuril X 1  - sVO2 on  2.5 mcg and Epi 0.02 mcg 64 with calculated CO 4.3 and .  increased to 5 mcg  - Add low dose Hydralazine/Isordil

## 2020-06-24 NOTE — NURSING
2100H: Pt arrived via EMS from [facility]. Unarousable, on Fentanyl and Precedex sedations, On Epi, Milrinone and Lasix ggts, intubated on Fi02 50%, with R radial Marion, L IJ TLC, Aguirre and L nare NGT. Afebrile at 98.1. ST at 120s MAP at 70-80s, Initial CVP at 30. Father came to talk see the patient and talk to the MD and left. Will revise gtts as ordered.

## 2020-06-24 NOTE — HPI
Ms. Yudy Jauregui is a 19 y.o.female with prior history of focal epilepsy with complex partial seizures, developmental delay and ADHD. She presented to [facility] on 2020 with symptoms of shortness of breath, fatigue, and feeling unwell. Further work up showed that she was in cardiogenic shock. Further work up showed transaminitis, acute kidney injury and acute hypoxic respiratory failure requiring mechanical ventilation. During her stay at [facility], she spiked fever 100.8 and continued on inotrope support (see below). She was on Lasix and Albumin and Diuril which was held secondary to elevated urine output for the last lasts. He ventilation setting improved and her FiO2 decrease to medium setting oxygenations. She was on solumedrol and received IVIG 100g as well. Echo noted to have mod mitral valve insufficiency, mild tricuspid valve insufficiency, moderate LV dilation, decreased RV and LV systolic function, small pericardial effusion and bilateral pleural effusion; EF ~20%. Repeat echo with similar results. On arrival she was on multiple infusions including Milrinone Infusion 0.5 mcg/kg/min, Heparin 1 unit/mL, Epi 0.03 mcg/kg/min, and sedated with Fentanyl 100 mcg/hr and dexmedetomidine 0.5 mcg/kg/hr.     Ped History:  Born full term and born with    She developed RSV as two weeks old  Developmental delay and interllectula diability and ADHD and she was on different medications     Family History:  No first degree relative with heart disease   She has a a sibling sister who passed at age of 12 months with developmental dealy related to the brain (not to heart).

## 2020-06-24 NOTE — ASSESSMENT & PLAN NOTE
- Echo prior arrival showed Mod MR, Mild TR, Moderate LV dilation, decreased RV and LV systolic function, small pericardial effusion and bilateral pleural effusion; EF ~20%. Repeat echo with similar results. Plan to repeat Echo here.   - The etiology of her cardiogenic shock could be viral myocarditis associated with multi-organ dysfunction   - Continue current inotrope:  - Milrinone 0.5 mcg/kg/min - will consider switch to  depend on kidney function   - Epinephrine ggt at 0.03mcg/kg/min, goal MAP >65  - Diuretics based on her CVP  - Repeated bedside Echo showed EF ~ 20%, mod MR, mild TR and moderately impaired RV function   - CVP 30, SvO2 65, CI 2.76, CO 4.64 and

## 2020-06-24 NOTE — HPI
Ms. Yudy Jauregui is a 19 yoF with previous complex partial seizures (controlled over last 2 years on keppra), developmental delay, scoliosis, and ADHD. She presented to [facility] on 6/20/2020 with SOB and fatigue, after which she was found to be in cardiogenic shock. Further work up revealed an ongoing transaminitis, CHRISTIAN and acute hypoxic respiratory failure requiring mechanical ventilation. Her hospital course was complicated by a fever (100.8) and was later started on Lasix, Albumin and Diuril which was later held secondary to elevated urine output. She additionally was started on solumedrol and received IVIG 100g as well. Echo at OSH revealed moderate mitral valve insufficiency with moderate LV dilation and bilateral pleural effusion (EF ~20%).     [place] [facility] [place] remained on a milrinone infusion of 0.5 mcg/kg/min, heparin  (at 1 unit/mL), Epi (at 0.03 mcg/kg/min), fentanyl (at 100 mcg/hr) and dexmedetomidine (at 0.5 mcg/kg/hr). Neurology consulted for AMS on 6/24. Patient was off of all sedation and precedex at the time of examination but remained on dobutamine and pressor support.

## 2020-06-24 NOTE — ASSESSMENT & PLAN NOTE
Patient with developmental delay, scoliosis, ADHD, and epiliepsy (controlled on keppra) transferred from OSH with cardiogenic shock with an associated liver and kidney injury. Also a previously noted episode of being febrile >101. She was sedated and intubated on arrival from OSH and on dobutamine drip.     Per Echo at OSH, her HF seems to be possibly secondary to viral etiology causing dilation and decreased EF. This has lead to cardiogenic shock with an associated acute hypoxemic respiratory failure, acidemia with hypercarbia, liver injury with transaminitis, and CHRISTIAN. Her LFTs are currently down trending but she has a persistently elevated creatinine    Patient's cardiologic shock is seemingly from a viral etiology. Her AMS may be 2/2 to viral encephalitis pr from persistent effects of sedation of fentanyl and precedex in the setting of CHRISTIAN and liver injury causing metabolites to stay in the body for extensive period of time. It is also worth questioning if the patient's ongoing constellation of symptoms could be a post COVID syndrome. Also possibly epilepsy/status causing AMS as her ongoing symptoms may have lowered her seizure threshold.     Recommendations:  -- Covid antibodies ordered; will follow up  -- Attempted LP at bedside; unsuccessful  -- Recommend that primary team consult anesthesia for LP  -- LP labs placed; will follow up  -- Recommend 24hr EEG; will follow up\  -- Recommend obtaining TSH and NH4

## 2020-06-24 NOTE — RESPIRATORY THERAPY
Pt arrived intubated with a 6.5 ETT secured at 19 at the lips. Pt was placed on vent on documented settings. Will continue to monitor.          06/23/20 2054   Patient Assessment/Suction   Respiratory Effort Unlabored   Expansion/Accessory Muscles/Retractions expansion symmetric;no retractions;no use of accessory muscles   Rhythm/Pattern, Respiratory unlabored;pattern regular;depth regular   PRE-TX-O2   O2 Device (Oxygen Therapy) ventilator   Oxygen Concentration (%) 50   SpO2 97 %   Pulse Oximetry Type Continuous   $ Pulse Oximetry - Multiple Charge Pulse Oximetry - Multiple   Pulse (!) 129   Resp 17   Vital Capacity   Vital Capacity (mL) 0   Wound Care   $ Wound Care Tech Time 15 min   Area of Concern Cheek;Upper lip;Lower lip;Corner lip   Skin Color/Characteristics without discoloration   Skin Temperature cool        Airway - Non-Surgical 06/23/20 2040 Endotracheal Tube   Placement Date/Time: 06/23/20 2040   Present Prior to Hospital Arrival?: Yes  Inserted by: MD  Airway Device: Endotracheal Tube  Airway Device Size: 6.5  Style: Cuffed  Cuff Inflated With: Air  Placement Verified By: Auscultation;Chest X-ray  Findings...   Secured at 19 cm   Measured At Lips   Secured Location Right   Secured by Commercial tube salvador   Bite Block secure and patent   Site Condition Cool;Dry   Status Intact;Secured;Patent   Site Assessment Clean;Dry   Cuff Pressure 28 cm H2O   Vent Select   Conventional Vent Y   Intubation? Transfer In   Ventilator Initiated Yes   $ Ventilator Initial 1   Charged w/in last 24h YES   Preset Conventional Ventilator Settings   Vent ID [id number]   Vent Type    Ventilation Type VC+   Vent Mode A/C   Humidity HME   Set Rate 14 BPM   Vt Set 300 mL   PEEP/CPAP 8 cmH20   Insp Time 0.9 Sec(s)   Insp Rise Time  50 %   Trigger Sensitivity Flow/I-Trigger 3 L/min   Patient Ventilator Parameters   Resp Rate Total 15 br/min   Peak Airway Pressure 22 cmH2O   Mean Airway Pressure 11 cmH20   Plateau Pressure 21  "cmH20   Exhaled Vt 330 mL   Total Ve 5.41 mL   I:E Ratio Measured 1.20:1   Total PEEP 8.5 cmH20   Inspired Tidal Volume (VTI) 142 mL   Conventional Ventilator Alarms   Alarms On Y   Ve High Alarm 20 L/min   Ve Low Alarm 2.95 L/min   Vt High Alarm 1070 mL   Resp Rate High Alarm 50 br/min   Press High Alarm 50 cmH2O   Apnea Rate 14   Apnea Volume (mL) 300 mL   Apnea Oxygen Concentration  100   Apnea Flow Rate (L/min) 40   T Apnea 20 sec(s)   Ready to Wean/Extubation Screen   FIO2<=50 (chart decimal) 0.5   MV<16L (chart vol.) 5.41   PEEP <=8 (chart #) 8   Ready to Wean Parameters   F/VT Ratio<105 (RSBI) (!) 51.52   IBW/VT Calculations   Height 4' 11" (1.499 m)   IBW/kg (Calculated) Female 43.2 kg   Low Range Vt 4cc/kg FEMALE 172.8 mL   Low Range Vt 6cc/kg FEMALE 259.2 mL   Adult Moderate Range vt 8cc/kg FEMALE 345.6 mL   Adult High Range Vt 10cc/kg FEMALE 432 mL   Current VT Set/IBW (Calculated) FEMALE 6.94 ML\Kg     "

## 2020-06-24 NOTE — ASSESSMENT & PLAN NOTE
- Current on A/C ventilation with RR 14, Vt 300, PEEP 8, FiO2 50%  - Weaning for goal of Vt 270 (6 ml/kg ideal body weight)  Vent Mode: A/C  Oxygen Concentration (%):  [50-97] 50  Resp Rate Total:  [15 br/min-18 br/min] 18 br/min  Vt Set:  [300 mL] 300 mL  PEEP/CPAP:  [8 cmH20] 8 cmH20  Mean Airway Pressure:  [11 cmH20] 11 cmH20

## 2020-06-24 NOTE — SUBJECTIVE & OBJECTIVE
Past Medical History:   Diagnosis Date    Intellectual disability     Seizures     Strabismus        Past Surgical History:   Procedure Laterality Date    STRABISMUS SURGERY  9/99    BMR recession OU .5MM    STRABISMUS SURGERY  7/2001    BSO tenotomy/LLR recession 4mm    STRABISMUS SURGERY  5/2005    RLR recession 6mm/ IO myectomy OU        Review of patient's allergies indicates:  No Known Allergies    Current Facility-Administered Medications   Medication    chlorhexidine 0.12 % solution 15 mL    dexmedetomidine (PRECEDEX) 400mcg/100mL 0.9% NaCL infusion    dextrose 10% (D10W) Bolus    EPINEPHrine (ADRENALIN) 5 mg in sodium chloride 0.9% 250 mL infusion    famotidine (PF) injection 20 mg    fentaNYL 2500 mcg in 0.9% sodium chloride 250 mL infusion premix (titrating)    glucagon (human recombinant) injection 1 mg    levETIRAcetam (KEPPRA) 750 mg in dextrose 5 % 100 mL IVPB    milrinone 20mg in D5W 100 mL infusion    sodium chloride 0.9% flush 10 mL    sodium chloride 0.9% flush 10 mL     Family History     Problem Relation (Age of Onset)    Amblyopia Paternal Aunt    Cancer Maternal Grandfather    Cataracts Maternal Grandmother    Hypertension Father, Maternal Grandmother, Paternal Grandmother    Ovarian cancer Maternal Aunt    Strabismus Father, Paternal Aunt    Thyroid disease Paternal Grandfather        Tobacco Use    Smoking status: Never Smoker    Smokeless tobacco: Never Used   Substance and Sexual Activity    Alcohol use: No    Drug use: No    Sexual activity: Never     Birth control/protection: OCP     Comment: Seasonale     Review of Systems   Unable to perform ROS: Intubated     Objective:     Vital Signs (Most Recent):  Temp: 98.1 °F (36.7 °C) (06/23/20 2100)  Pulse: (!) 128 (06/23/20 2100)  Resp: 17 (06/23/20 2100)  BP: (!) 106/49 (06/23/20 2100)  SpO2: 97 % (06/23/20 2100) Vital Signs (24h Range):  Temp:  [98.1 °F (36.7 °C)-100.9 °F (38.3 °C)] 98.1 °F (36.7 °C)  Pulse:  [128-130]  128  Resp:  [14-17] 17  SpO2:  [96 %-97 %] 97 %  BP: (106-112)/(49-67) 106/49  Arterial Line BP: (110)/(79) 110/79     Patient Vitals for the past 72 hrs (Last 3 readings):   Weight   06/23/20 2100 70.7 kg (155 lb 13.8 oz)     Body mass index is 31.48 kg/m².    No intake or output data in the 24 hours ending 06/23/20 2207    Physical Exam  Vitals signs reviewed.   Eyes:      General:         Right eye: No discharge.         Left eye: No discharge.   Cardiovascular:      Rate and Rhythm: Regular rhythm. Tachycardia present.      Heart sounds: Murmur present. Gallop present.    Pulmonary:      Breath sounds: Rales present. No wheezing.      Comments: Vent Mode: A/C  Oxygen Concentration (%):  (50-97) 50  Resp Rate Total:  (15 br/min-18 br/min) 18 br/min  Vt Set:  (300 mL) 300 mL  PEEP/CPAP:  (8 cmH20) 8 cmH20  Mean Airway Pressure:  (11 cmH20) 11 cmH20    Abdominal:      General: Abdomen is flat. There is no distension.   Musculoskeletal:         General: No swelling.   Skin:     General: Skin is warm.      Coloration: Skin is not jaundiced or pale.   Neurological:      Cranial Nerves: No cranial nerve deficit.         Significant Labs:  CBC:  Recent Labs   Lab 06/23/20 2059   WBC 9.39   RBC 3.98*   HGB 10.9*   HCT 35.1*   PLT 98*   MCV 88   MCH 27.4   MCHC 31.1*     Coagulation:   Recent Labs   Lab 06/23/20 2059   INR 1.5*   APTT 24.0     LDH:  No results for input(s): LDH in the last 72 hours.  Microbiology:  Microbiology Results (last 7 days)     ** No results found for the last 168 hours. **          I have reviewed all pertinent labs within the past 24 hours.    Diagnostic Results:  I have reviewed all pertinent imaging results/findings within the past 24 hours.  I have reviewed and interpreted all pertinent imaging results/findings within the past 24 hours.

## 2020-06-24 NOTE — PLAN OF CARE
No acute events throughout day, VS and assessment per flow sheet, see below for updates:    Pulmonary: -ET 6.5 at 19 lip Fi02 at 50% minimal secretions -sats at %    Cardiovascular: -ST at 120-128 -MAP maintained above 65 on Epi and Milrinone gtts -Pulses weak    Neurological: -Unarousable -Off Precedex by 0330H -Pupils sluggish -Afebrile at 98.2    Gastrointestinal: -NGT on L nare -No BM this shift    Genitourinary: -Foleys switched to new one Fr 12 -On Lasix gtt -No uop (HTS updated)    Endocrine: -BG at 167 on arrival     Skin/Bath: -With L IJ TLC -R radial Marion and PIV    Date of last CHG bath given: -Full chlorhex bath given upon admit    Infusions: -Milrinone -Epi -Lasix    Patient progressing towards goals as tolerated, plan of care communicated and reviewed with Yudy Jauregui and family, all concerns addressed, will continue to monitor.     Jose Ortiz RN

## 2020-06-25 NOTE — PLAN OF CARE
CMICU DAILY GOALS       A: Awake    RASS: Goal - RASS Goal: 0-->alert and calm  Actual - RASS (Spencer Agitation-Sedation Scale): -3-->moderate sedation   Restraint necessity:  not needed  B: Breath   SBT: Not attempted   C: Coordinate A & B, analgesics/sedatives   Pain: managed    SAT: Not attempted  D: Delirium   CAM-ICU: Overall CAM-ICU: Positive  E: Early Mobility   MOVE Screen: Fail   Activity: Activity Management: bedrest maintained per order  FAS: Feeding/Nutrition   Diet order: Diet/Nutrition Received: tube feeding,   Fluid restriction:    T: Thrombus   DVT prophylaxis: VTE Required Core Measure: Pharmacological prophylaxis initiated/maintained, (SCDs) Sequential compression device initiated/maintained  H: HOB Elevation   Head of Bed (HOB): HOB at 30-45 degrees  U: Ulcer Prophylaxis   GI: yes  G: Glucose control   managed    S: Skin   Bundle compliance: yes   Bathing/Skin Care: linen changed, dressed/undressed, back care Date: 6/25/2020 AM shift  B: Bowel Function   constipation   I: Indwelling Catheters   Aguirre necessity:      Urethral Catheter 06/23/20 2040 Non-latex-Reason for Continuing Urinary Catheterization: Critically ill in ICU requiring intensive monitoring   CVC necessity: Yes   IPAD offered: Not appropriate  D: De-escalation Antibx   Yes  Plan for the day   CT scan of head, EEG monitoring off, possible trialysis catheter placement and CRRT treatment, LP pending wants INR less than 1.5 per MD  Family/Goals of care/Code Status   Code Status: Full Code     No acute events throughout day, VS and assessment per flow sheet, patient progressing towards goals as tolerated, plan of care reviewed with Yudy Jauregui and family, all concerns addressed, will continue to monitor.

## 2020-06-25 NOTE — PROCEDURES
EEG REPORT      Yudy Jauregui  [id number]  2000    DATE OF SERVICE: 6/25/2020     -2    METHODOLOGY      Extended electroencephalographic recording is made while the patient is ambulatory and continuing normal daily activities.  Electrodes are placed according to the International 10-20 placement system and included T1 and T2 electrode placement.  Twenty four (24) channels of digital signal (sampling rate of 512/sec) was simultaneously recorded from the scalp including EKG and eye monitors.  Recording band pass was 0.1 to 100 hz and all data was stored digitally on the recorder.  The patient is instructed to press an event button when clinical symptoms occur and write the symptoms into a diary. Activation procedures which include photic stimulation, hyperventilation and instructing patients to perform simple task are done in selected patients.        The EEG is displayed on a monitor screen and can be reformatted into different montages for evaluation.  The entire recoding is submitted for computer assisted analysis to detect spike and electrographic seizure activity.  The entire recording is visually reviewed and the times identified by computer analysis as being spikes or seizures are reviewed again.  Compresses spectral analysis (CSA) is also performed on the activity recorded from each individual channel.  This is displayed as a power display of frequencies from 0 to 30 Hz over time.   The CSA analysis is done and displayed continuously.  This is reviewed for asymmetries in power between homologous areas of the scalp and for presence of changes in power which canbe seen when seizures occur.  Sections of suspected abnormalities on the CSA is then compared with the original EEG recording.  .     eCullet software was also utilized in the review of this study.  This software suite analyzes the EEG recording in multiple domains.  Coherence and rhythmicity is computed to identify EEG sections which may contain  organized seizures.  Each channel undergoes analysis to detect presence of spike and sharp waves which have special and morphological characteristic of epileptic activity.  The routine EEG recording is converted from spacial into frequency domain.  This is then displayed comparing homologous areas to identify areas of significant asymmetry.  Algorithm to identify non-cortically generated artifact is used to separate eye movement, EMG and other artifact from the EEG     Recording Times  Start on 6/25/2020 at 07:00:57  Stop on 6/25/2020 at 16:21:19    A total of 9:16:12 hours of EEG was recorded.      EEG FINDINGS:  Background activity:   Much of the recording consists of moderate to high voltage 1-3Hz delta and some theta activity likely representing poorly staged sleep with occasional poorly formed K complexes.  There are brief arousals with a poorly organized moderate voltage theta delta background.    IMPRESSION:   Abnormal EEG due to the finding of a moderate generalized cerebral dysfunction.  There are no focal abnormalities or indications of seizure tendency      Juan Diego Moreno MD  Neurology-Epilepsy.  [facility].

## 2020-06-25 NOTE — ASSESSMENT & PLAN NOTE
Patient currently on ventilator and intubated. Most recent BG with improving acidemia and hypercarbia.   Reyes added; plans for SLED with nephro for volume removal    Continued management per primary team

## 2020-06-25 NOTE — ASSESSMENT & PLAN NOTE
EF <10% with LVEDD 6.5 cm. Mod fxnl MR. Mod LAE. Mod RV dysfunction. PASP at least 17 mmHg.   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 Other etiologies could be viral myocarditis or CM related to septic shock    Hemodynamics:  6/25: CO 3.6 / CI 2.3 / SVR 1425 / CVP 7 / SVO2 47%    -  5 mcg/kg/min  - Epinephrine 0.04 mcg/kg/min  - Reyes 20 PPM  - Lasix 40 mg/hr  - Intermittent diuril  - May need HD in near future for volume removal  - Afterload reduction with hydralazine/isosorbide

## 2020-06-25 NOTE — SUBJECTIVE & OBJECTIVE
Interval History: No acute events overnight, patient with episode of emesis this AM. KUB with partial ileus. Patient less responsive this AM than yesterday. LP pending. EEG in process. Patient has remained afebrile [place] [facility] [place]    Review of Systems   Unable to perform ROS: Intubated     Objective:     Vital Signs (Most Recent):  Temp: 99.7 °F (37.6 °C) (06/25/20 1101)  Pulse: (!) 137 (06/25/20 1400)  Resp: (!) 26 (06/25/20 1400)  BP: 113/65 (06/25/20 1400)  SpO2: 100 % (06/25/20 1400) Vital Signs (24h Range):  Temp:  [97.7 °F (36.5 °C)-99.7 °F (37.6 °C)] 99.7 °F (37.6 °C)  Pulse:  [129-137] 137  Resp:  [0-26] 26  SpO2:  [97 %-100 %] 100 %  BP: ()/(56-72) 113/65  Arterial Line BP: ()/(63-84) 100/79     Weight: 72 kg (158 lb 11.7 oz)  Body mass index is 32.06 kg/m².    Estimated Creatinine Clearance: 37.5 mL/min (A) (based on SCr of 2.7 mg/dL (H)).    Physical Exam  Vitals signs and nursing note reviewed.   Constitutional:       Appearance: She is well-developed.      Comments: Intubated and sedated   HENT:      Head: Normocephalic and atraumatic.      Nose: No congestion or rhinorrhea.      Mouth/Throat:      Comments: OG tube, no drainage in suction cannister  Eyes:      General: Scleral icterus present.      Pupils: Pupils are equal, round, and reactive to light.   Neck:      Musculoskeletal: Normal range of motion and neck supple.      Comments: LIJ central line in place  Cardiovascular:      Rate and Rhythm: Normal rate and regular rhythm.      Heart sounds: Normal heart sounds. No murmur. No friction rub.   Pulmonary:      Effort: Pulmonary effort is normal. No respiratory distress.      Breath sounds: No wheezing or rales.      Comments: ET tube in place, mechanical breath sounds  Abdominal:      General: There is no distension.      Palpations: Abdomen is soft.      Tenderness: There is no abdominal tenderness. There is no guarding or rebound.      Comments: Absent bowel sounds    Musculoskeletal: Normal range of motion.   Lymphadenopathy:      Cervical: No cervical adenopathy.   Skin:     General: Skin is warm and dry.      Coloration: Skin is not pale.      Findings: No erythema.   Neurological:      Mental Status: She is oriented to person, place, and time.      Cranial Nerves: No cranial nerve deficit.         Significant Labs:   CBC:   Recent Labs   Lab 06/24/20  1558 06/24/20  1838 06/25/20  0919   WBC 9.59 9.65 9.46   HGB 10.7* 10.8* 10.8*   HCT 34.4* 34.6* 35.5*   PLT 91* 80* 84*     CMP:   Recent Labs   Lab 06/23/20  2100 06/24/20  0340  06/25/20  0321 06/25/20  0919 06/25/20  1111     138 138  138   < > 138  138  138 SEE COMMENT 139   K 3.5  3.5 5.2*  5.2*   < > 4.3  4.3  4.3 SEE COMMENT 3.8     103 106  106   < > 102  102  102 SEE COMMENT 98   CO2 21*  21* 19*  19*   < > 18*  18*  18* SEE COMMENT 21*   *  173* 155*  155*   < > 139*  139*  139* SEE COMMENT 216*   BUN 56*  56* 64*  64*   < > 86*  86*  86* SEE COMMENT 93*   CREATININE 1.8*  1.8* 2.1*  2.1*   < > 2.6*  2.6*  2.6* SEE COMMENT 2.7*   CALCIUM 11.1*  11.1* 10.5  10.5   < > 9.4  9.4  9.4 SEE COMMENT 8.6*   PROT 7.8  7.8 7.5  --  7.3  --   --    ALBUMIN 3.9  3.9 3.6   < > 3.4*  3.4* SEE COMMENT 3.0*   BILITOT 4.1*  4.1* 3.8*  --  3.6*  --   --    ALKPHOS 38*  38* 35*  --  34*  --   --    AST 64*  64* 60*  --  56*  --   --    *  139* 130*  --  120*  --   --    ANIONGAP 14  14 13  13   < > 18*  18*  18* SEE COMMENT 20*   EGFRNONAA 39.7*  39.7* 32.9*  32.9*   < > 25.4*  25.4*  25.4* SEE COMMENT 24.3*    < > = values in this interval not displayed.     Microbiology Results (last 7 days)     Procedure Component Value Units Date/Time    Respiratory Infection Panel (PCR), Nasopharyngeal [[id number]] Collected: 06/24/20 2328    Order Status: Sent Specimen: Nasopharyngeal Swab Updated: 06/25/20 0027    CSF culture [[id number]]     Order Status: No result  Specimen: CSF (Spinal Fluid) from CSF Tap, Tube 3     Gram stain [[id number]]     Order Status: No result Specimen: CSF (Spinal Fluid) from CSF Tap, Tube 3         All pertinent labs within the past 24 hours have been reviewed.    Significant Imaging: I have reviewed all pertinent imaging results/findings within the past 24 hours.

## 2020-06-25 NOTE — PROGRESS NOTES
[facility]  Nephrology  Progress Note    Patient Name: Yudy Jauregui  MRN: [id number]  Admission Date: 6/23/2020  Hospital Length of Stay: 2 days  Attending Provider: Kaitlin Gamboa MD   Primary Care Physician: Niurka Sinclair MD  Principal Problem:Cardiogenic shock      Interval History: Patient seen and examined at bedside. Pt remains intubated; on 40% FiO2. Inadequate UOP despite high-dose diuretics. 1L UOP/past 24hrs. Requiring vasopressors. Worsening renal function; sCr up to 2.6, from 2.1 yesterday. Significantly elevated CVP's.       Objective:     Vital Signs (Most Recent):  Temp: 99.7 °F (37.6 °C) (06/25/20 1101)  Pulse: (!) 136 (06/25/20 1125)  Resp: (!) 25 (06/25/20 1125)  BP: 109/72 (06/25/20 1125)  SpO2: 100 % (06/25/20 1125)  O2 Device (Oxygen Therapy): ventilator (06/25/20 1125) Vital Signs (24h Range):  Temp:  [97.7 °F (36.5 °C)-99.7 °F (37.6 °C)] 99.7 °F (37.6 °C)  Pulse:  [129-136] 136  Resp:  [0-26] 25  SpO2:  [95 %-100 %] 100 %  BP: ()/(56-72) 109/72  Arterial Line BP: ()/(63-80) 97/75     Weight: 72 kg (158 lb 11.7 oz) (06/25/20 0300)  Body mass index is 32.06 kg/m².  Body surface area is 1.73 meters squared.    I/O last 3 completed shifts:  In: 1572.2 [I.V.:672.2; NG/GT:350; IV Piggyback:550]  Out: 1005 [Urine:1005]    Physical Exam  Vitals signs and nursing note reviewed.   Constitutional:       Appearance: She is well-developed. She is ill-appearing. She is not diaphoretic.      Interventions: She is sedated and intubated.   HENT:      Head: Atraumatic.      Mouth/Throat:      Mouth: Mucous membranes are moist.   Eyes:      Extraocular Movements: Extraocular movements intact.      Conjunctiva/sclera: Conjunctivae normal.   Cardiovascular:      Rate and Rhythm: Regular rhythm. Tachycardia present.   Pulmonary:      Effort: She is intubated.      Breath sounds: No wheezing or rales.   Abdominal:      General: There is distension.      Palpations: Abdomen is soft.    Musculoskeletal:         General: Swelling present. No signs of injury.      Right lower leg: Edema present.      Left lower leg: Edema present.   Skin:     General: Skin is warm and dry.      Coloration: Skin is not jaundiced.      Findings: No rash.         Significant Labs:  ABGs:   Recent Labs   Lab 06/25/20  0340   PH 7.320*   PCO2 43.4   HCO3 22.3*   POCSATURATED 56*   BE -4     CBC:   Recent Labs   Lab 06/25/20  0919   WBC 9.46   RBC 4.01   HGB 10.8*   HCT 35.5*   PLT 84*   MCV 89   MCH 26.9*   MCHC 30.4*     CMP:   Recent Labs   Lab 06/25/20  0321 06/25/20  0919   *  139*  139* SEE COMMENT   CALCIUM 9.4  9.4  9.4 SEE COMMENT   ALBUMIN 3.4*  3.4* SEE COMMENT   PROT 7.3  --      138  138 SEE COMMENT   K 4.3  4.3  4.3 SEE COMMENT   CO2 18*  18*  18* SEE COMMENT     102  102 SEE COMMENT   BUN 86*  86*  86* SEE COMMENT   CREATININE 2.6*  2.6*  2.6* SEE COMMENT   ALKPHOS 34*  --    *  --    AST 56*  --    BILITOT 3.6*  --            Assessment/Plan:     * Cardiogenic shock  -MGMT per primary team    CHRISTIAN (acute kidney injury)  Non-Oliguric CHRISTIAN on Unknown Baseline Renal Function    At time of Consult: 20 yo female with PMHx of seizure disorder, ADHD, and developmental delays who presents as a transfer from OSH for higher level of care. Originally presented to ED with CC of SOB, fatigue and overall not feeling well.  Found to have a SCr of 1.6, elevated liver enzymes and tachycardia.  She underwent CTA (negative for PE) but found to have cardiomegally, pericardial effusion, pleural effusions and ascites and was later transfer [facility], where she was found to be in cardiogenic shock.  She was transferred to [facility] for higher level of care and Nephrology was consulted for worsening kidney function.    CHRISTIAN thought to be 2/2 Acute tubular injury from cardiogenic shock  Possible component of NOHELIA from CTA obtained on admission.    Doesn't appear to have RPGN based on  available labs.    6/24:  Urine Microscopy with WBC, RBC (non-dysmorphic) hyaline casts, fine/coarse granular casts         Plan/Recommendations:    -Plan for 8hr SLED with UFG of 250-350cc/hr as tolerated  -Keep MAP >65  -Labs per CRRT protocol   -Maintain Hgb >7.0  -Renally dose meds and avoid nephrotoxic meds  -Strict I/O's  -Will continue to follow closely   -Plan discussed with staff, Dr Reyna         Thank you for your consult. I will follow-up with patient. Please contact us if you have any additional questions.    Homero Jerry NP  Nephrology  [facility]

## 2020-06-25 NOTE — PROGRESS NOTES
[facility]  Neurology  Progress Note    Patient Name: Yudy Jauregui  MRN: [id number]  Admission Date: 6/23/2020  Hospital Length of Stay: 2 days  Code Status: Full Code   Attending Provider: Kaitlin Gamboa MD  Primary Care Physician: Niurka Sinclair MD   Principal Problem:Cardiogenic shock    Subjective:     Interval History: Patient without acute events overnight. She has eyes open this morning but not following commands. She had improved over the course of the previous day with spontaneous movements and some simple commands, however has regressed some once again this morning. LP unsuccessful at bedside previous day.     Review of Systems   Unable to perform ROS: Mental status change   Constitutional: Positive for fatigue. Negative for chills, diaphoresis and fever.   HENT: Negative for rhinorrhea and sneezing.    Eyes: Negative for discharge and redness.   Respiratory: Positive for shortness of breath (verntilator). Negative for cough and wheezing.    Cardiovascular: Negative for leg swelling.   Gastrointestinal: Negative for diarrhea and vomiting.   Skin: Negative for pallor and rash.   Neurological: Positive for speech difficulty and weakness. Negative for tremors and facial asymmetry.   Psychiatric/Behavioral: Positive for confusion and decreased concentration.     Objective:     Vital Signs (Most Recent):  Temp: 98.1 °F (36.7 °C) (06/25/20 0701)  Pulse: (!) 135 (06/25/20 0900)  Resp: (!) 25 (06/25/20 0900)  BP: 99/63 (06/25/20 0900)  SpO2: 100 % (06/25/20 0900) Vital Signs (24h Range):  Temp:  [97.5 °F (36.4 °C)-98.4 °F (36.9 °C)] 98.1 °F (36.7 °C)  Pulse:  [129-136] 135  Resp:  [0-26] 25  SpO2:  [95 %-100 %] 100 %  BP: ()/(56-66) 99/63  Arterial Line BP: ()/(63-80) 79/72     Weight: 72 kg (158 lb 11.7 oz)  Body mass index is 32.06 kg/m².    Physical Exam  Constitutional:       Comments: Opens eyes to command   HENT:      Head:      Comments: intubated     Nose: Nose normal.   Eyes:       General: No scleral icterus.     Conjunctiva/sclera: Conjunctivae normal.      Comments: Constricted pupils. Sluggish reactivity to light without consensual response on other eye. No EOM when prompted   Cardiovascular:      Pulses: Normal pulses.      Heart sounds: No murmur.      Comments: Intermittent tachycardia  Pulmonary:      Effort: Respiratory distress present.      Breath sounds: No wheezing or rales.      Comments: Intubated on ventilator   Abdominal:      General: Abdomen is flat. There is distension.      Tenderness: There is no abdominal tenderness.   Musculoskeletal:         General: No swelling.      Comments: Decreased ROM 4/4   Skin:     General: Skin is warm.      Coloration: Skin is not jaundiced.      Findings: No bruising.   Neurological:      Cranial Nerves: Cranial nerve deficit present.      Sensory: Sensory deficit present.      Motor: Weakness present.      Deep Tendon Reflexes: Reflexes abnormal (upgoing plantar reflex).      Reflex Scores:       Bicep reflexes are 2+ on the right side and 2+ on the left side.       Brachioradialis reflexes are 2+ on the right side and 2+ on the left side.       Patellar reflexes are 2+ on the right side and 2+ on the left side.     Comments: Not withdrawing from wain  No facial grimace         NEUROLOGICAL EXAMINATION:     MENTAL STATUS   Oriented to person.   Oriented to place.   Oriented to time.   Speech: (Intubated)  Level of consciousness: opens eyes to command. does not follow other commands.    CRANIAL NERVES     CN III, IV, VI   Right pupil: Size: 3 mm. Shape: regular. Reactivity: sluggish. Accommodation: absent.   Left pupil: Size: 3 mm. Shape: regular. Reactivity: sluggish. Accommodation: absent.   CN III: bilateral CN III palsy  CN VI: bilateral CN VI palsy    CN V   Right facial sensation deficit: complete  Left facial sensation deficit: complete    CN IX, X   Right gag reflex: normal  Left gag reflex: normal       Some improvement in  corneal reflex this morning     MOTOR EXAM   Muscle bulk: decreased  Overall muscle tone: decreased    REFLEXES     Reflexes   Right brachioradialis: 2+  Left brachioradialis: 2+  Right biceps: 2+  Left biceps: 2+  Right patellar: 2+  Left patellar: 2+  Right plantar: upgoing  Left plantar: upgoing  Right ankle clonus: absent  Left ankle clonus: absent    SENSORY EXAM        No weithdrawal from pain. Patient currently obtunded.       Significant Labs:   Recent Lab Results       06/25/20  0922   06/25/20  0921   06/25/20  0340   06/25/20  0321   06/24/20  2121        Albumin       3.4              3.4       Alkaline Phosphatase       34       Allens Test     N/A         ALT       120       Ammonia               Anion Gap       18              18              18       Ao peak hieu               Ao VTI               aPTT       26.0  Comment:  aPTT therapeutic range = 39-69 seconds       AST       56       AV valve area               AV mean gradient               AV index (prosthetic)               AV peak gradient               AV Velocity Ratio               Baso #               Basophil%               BILIRUBIN TOTAL       3.6  Comment:  For infants and newborns, interpretation of results should be based  on gestational age, weight and in agreement with clinical  observations.  Premature Infant recommended reference ranges:  Up to 24 hours.............<8.0 mg/dL  Up to 48 hours............<12.0 mg/dL  3-5 days..................<15.0 mg/dL  6-29 days.................<15.0 mg/dL         Site     Other         BSA               BUN, Bld       86              86              86       Calcium       9.4              9.4              9.4       Chloride       102              102              102       CO2       18              18              18       Creatinine       2.6              2.6              2.6       Creatinine, Random Ur               Left Ventricle Relative Wall Thickness               DelSys     Adult Vent          Differential Method               E/E' ratio               eGFR if        29.3              29.3              29.3       eGFR if non        25.4  Comment:  Calculation used to obtain the estimated glomerular filtration  rate (eGFR) is the CKD-EPI equation.                 25.4  Comment:  Calculation used to obtain the estimated glomerular filtration  rate (eGFR) is the CKD-EPI equation.                 25.4  Comment:  Calculation used to obtain the estimated glomerular filtration  rate (eGFR) is the CKD-EPI equation.          Eos #               Eosinophil%               Free T4               FS               Glucose       139              139              139       Gran # (ANC)               Gran%               Hematocrit               Hemoglobin               Immature Grans (Abs)               Immature Granulocytes               INR       1.7  Comment:  Coumadin Therapy:  2.0 - 3.0 for INR for all indicators except mechanical heart valves  and antiphospholipid syndromes which should use 2.5 - 3.5.         IVS               LA WIDTH               Left Atrium Major Axis               Left Atrium Minor Axis               LA size               LA volume               LA Volume Index               LVOT area               LV LATERAL E/E' RATIO               LV SEPTAL E/E' RATIO               LV Diastolic Volume               LV Diastolic Volume Index               LVIDD               LVIDS               LV mass               LV Mass Index               LVOT diameter               LVOT peak hieu               LVOT stroke volume               LVOT peak VTI               LV Systolic Volume               LV Systolic Volume Index               Lymph #               Lymph%               Magnesium       2.2       MCH               MCHC               MCV               Mean e'               Miscellaneous Test Name               Mode     AC/PRVC         Mono #               Mono%                MPV               MV Peak E Oz               nRBC               Phosphorus       5.1              5.1       Platelets               POC BE     -4         POC HCO3     22.3         POC PCO2     43.4         POC PH     7.320         POC PO2     32         POC SATURATED O2     56         POC TCO2     24         POCT Glucose 188 420     140     Potassium       4.3              4.3              4.3       Prot/Creat Ratio, Ur               PROTEIN TOTAL       7.3       Protein, Urine Random               Protime       16.8       PW               RA Major Axis               RA Width               RBC               RDW               RV S'               RVDD               Sample     VENOUS         COVID-19 (SARS CoV-2) IgG Ab               Sinus               Sodium       138              138              138       Sodium Urine Random               TAPSE               TDI SEPTAL               TDI LATERAL               Triscuspid Valve Regurgitation Peak Gradient               TR Max Oz               TSH               Vt     307         WBC                                06/24/20  1838   06/24/20  1826   06/24/20  1558   06/24/20  1553   06/24/20  1313        Albumin     3.5              3.5         Alkaline Phosphatase               Allens Test               ALT               Ammonia     62         Anion Gap     14              14         Ao peak oz               Ao VTI               aPTT               AST               AV valve area               AV mean gradient               AV index (prosthetic)               AV peak gradient               AV Velocity Ratio               Baso # 0.00   0.00         Basophil% 0.0   0.0         BILIRUBIN TOTAL               Site               BSA               BUN, Bld     72              72         Calcium     9.5              9.5         Chloride     103              103         CO2     19              19         Creatinine     2.2              2.2         Creatinine, Random Ur                Left Ventricle Relative Wall Thickness               DelSys               Differential Method Automated   Automated         E/E' ratio               eGFR if      35.9              35.9         eGFR if non      31.1  Comment:  Calculation used to obtain the estimated glomerular filtration  rate (eGFR) is the CKD-EPI equation.                 31.1  Comment:  Calculation used to obtain the estimated glomerular filtration  rate (eGFR) is the CKD-EPI equation.            Eos # 0.0   0.0         Eosinophil% 0.0   0.0         Free T4     0.70         FS               Glucose     147              147         Gran # (ANC) 8.3   8.4         Gran% 86.3   87.8         Hematocrit 34.6   34.4         Hemoglobin 10.8   10.7         Immature Grans (Abs) 0.05  Comment:  Mild elevation in immature granulocytes is non specific and   can be seen in a variety of conditions including stress response,   acute inflammation, trauma and pregnancy. Correlation with other   laboratory and clinical findings is essential.     0.04  Comment:  Mild elevation in immature granulocytes is non specific and   can be seen in a variety of conditions including stress response,   acute inflammation, trauma and pregnancy. Correlation with other   laboratory and clinical findings is essential.           Immature Granulocytes 0.5   0.4         INR               IVS               LA WIDTH               Left Atrium Major Axis               Left Atrium Minor Axis               LA size               LA volume               LA Volume Index               LVOT area               LV LATERAL E/E' RATIO               LV SEPTAL E/E' RATIO               LV Diastolic Volume               LV Diastolic Volume Index               LVIDD               LVIDS               LV mass               LV Mass Index               LVOT diameter               LVOT peak hieu               LVOT stroke volume               LVOT peak VTI               LV  Systolic Volume               LV Systolic Volume Index               Lymph # 0.4   0.4         Lymph% 4.5   4.1         Magnesium               MCH 27.3   27.0         MCHC 31.2   31.1         MCV 88   87         Mean e'               Miscellaneous Test Name         Lab Vivi [id number]     Mode               Mono # 0.8   0.7         Mono% 8.7   7.7         MPV 11.1   12.6         MV Peak E Oz               nRBC 1   1         Phosphorus     4.5              4.5         Platelets 80   91         POC BE               POC HCO3               POC PCO2               POC PH               POC PO2               POC SATURATED O2               POC TCO2               POCT Glucose   137   146       Potassium     4.2              4.2         Prot/Creat Ratio, Ur               PROTEIN TOTAL               Protein, Urine Random               Protime               PW               RA Major Axis               RA Width               RBC 3.95   3.97         RDW 16.5   16.4         RV S'               RVDD               Sample               COVID-19 (SARS CoV-2) IgG Ab     Negative  Comment:  This test is only for use under Food and Drug Administration's   Emergency Use Authorization (EUA). Commercial reagents are   provided by [employer] for use with the  i  system. Performance characteristics of the EUA have been  independently verified by [facility] Department  of Pathology and Laboratory Medicine.  Results from antibody testing should not be used as the sole basis  to diagnose or exclude SARS-CoV-2 infection or to determine infection   status. This test is not for the screening of donated blood.  __________________________________________________________________  The [employer] IgG Antibody testing COVID-19 Letter of Authorization,  along with the authorized Fact Sheet for Healthcare Providers,  the authorized Fact Sheet for Patients, and authorized labeling are   available on the FDA  website:  https://www.fda.gov/medical-devices/emergency-situations-medical-devic  es/faq  s-diagnostic-testing-sars-cov-2  No IgG antibodies to SARS-CoV-2 are detected.  Negative results do not rule out SARS-CoV-2 infection, particularly   in immunosuppressed patients and/or those who have been in close  contact with the virus. Follow up testing with a molecular assay  should be considered to rule out infection in those patients.           Sinus               Sodium     136              136         Sodium Urine Random               TAPSE               TDI SEPTAL               TDI LATERAL               Triscuspid Valve Regurgitation Peak Gradient               TR Max Oz               TSH     1.720         Vt               WBC 9.65   9.59                          06/24/20  1036   06/24/20  1013        Albumin         Alkaline Phosphatase         Allens Test         ALT         Ammonia         Anion Gap         Ao peak oz   0.81     Ao VTI   8.98     aPTT         AST         AV valve area   1.87     AV mean gradient   2     AV index (prosthetic)   0.61     AV peak gradient   3     AV Velocity Ratio   0.67     Baso #         Basophil%         BILIRUBIN TOTAL         Site         BSA   1.71     BUN, Bld         Calcium         Chloride         CO2         Creatinine         Creatinine, Random Ur 27.0  Comment:  The random urine reference ranges provided were established   for 24 hour urine collections.  No reference ranges exist for  random urine specimens.  Correlate clinically.         Left Ventricle Relative Wall Thickness   0.21     DelSys         Differential Method         E/E' ratio   14.14     eGFR if          eGFR if non          Eos #         Eosinophil%         Free T4         FS   2     Glucose         Gran # (ANC)         Gran%         Hematocrit         Hemoglobin         Immature Grans (Abs)         Immature Granulocytes         INR         IVS   0.61     LA WIDTH   3.90      Left Atrium Major Axis   5.77     Left Atrium Minor Axis   5.54     LA size   3.95     LA volume   74.02     LA Volume Index   44.7     LVOT area   3.1     LV LATERAL E/E' RATIO   11.00     LV SEPTAL E/E' RATIO   19.80     LV Diastolic Volume   194.73     LV Diastolic Volume Index   117.68     LVIDD   6.50     LVIDS   6.40     LV mass   165.42     LV Mass Index   100     LVOT diameter   1.98     LVOT peak oz   0.54     LVOT stroke volume   16.80     LVOT peak VTI   5.46     LV Systolic Volume   175.18     LV Systolic Volume Index   105.9     Lymph #         Lymph%         Magnesium         MCH         MCHC         MCV         Mean e'   0.07     Miscellaneous Test Name         Mode         Mono #         Mono%         MPV         MV Peak E Oz   0.99     nRBC         Phosphorus         Platelets         POC BE         POC HCO3         POC PCO2         POC PH         POC PO2         POC SATURATED O2         POC TCO2         POCT Glucose         Potassium         Prot/Creat Ratio, Ur 0.59       PROTEIN TOTAL         Protein, Urine Random 16  Comment:  The random urine reference ranges provided were established   for 24 hour urine collections.  No reference ranges exist for  random urine specimens.  Correlate clinically.         Protime         PW   0.68     RA Major Axis   5.01     RA Width   3.30     RBC         RDW         RV S'   7.87     RVDD   4.10     Sample         COVID-19 (SARS CoV-2) IgG Ab         Sinus   3.18     Sodium         Sodium Urine Random 53  Comment:  The random urine reference ranges provided were established   for 24 hour urine collections.  No reference ranges exist for  random urine specimens.  Correlate clinically.         TAPSE   1.03     TDI SEPTAL   0.05     TDI LATERAL   0.09     Triscuspid Valve Regurgitation Peak Gradient   17     TR Max Oz   2.04     TSH         Vt         WBC             All pertinent lab results from the past 24 hours have been reviewed.    Significant Imaging:  I have reviewed all pertinent imaging results/findings within the past 24 hours.    Assessment and Plan:     * Cardiogenic shock  Patient with developmental delay, scoliosis, ADHD, and epiliepsy (controlled on keppra) transferred from OSH with cardiogenic shock with an associated liver and kidney injury. Also a previously noted episode of being febrile >101. She was sedated and intubated on arrival from OSH and on dobutamine drip.     Per Echo at OSH, her HF seems to be possibly secondary to viral etiology causing dilation and decreased EF. This has lead to cardiogenic shock with an associated acute hypoxemic respiratory failure, acidemia with hypercarbia, liver injury with transaminitis, and CHRISTIAN. Her LFTs are currently down trending but she has a persistently elevated creatinine    Patient's cardiologic shock is seemingly from a viral etiology. Her AMS may be 2/2 to viral encephalitis pr from persistent effects of sedation of fentanyl and precedex in the setting of CHRISTIAN and liver injury causing metabolites to stay in the body for extensive period of time. It is also worth questioning if the patient's ongoing constellation of symptoms could be a post COVID syndrome. Also possibly epilepsy/status causing AMS as her ongoing symptoms may have lowered her seizure threshold.     Reyes added; plans for SLED with nephro for volume removal  NH4 elevated; possible source of AMS    Recommendations:  -- Attempted LP at bedside; unsuccessful  -- Recommend that primary team consult anesthesia for LP  -- LP labs placed; will follow up  -- Recommend 24hr EEG; will follow up  -- NH4 elevated; possible source of AMS      CHRISTIAN (acute kidney injury)  See Cardiogenic shock    Coagulation defect, unspecified  See Cardiogenic shock    Seizure disorder  Patient with previous complex partial seizures (controlled over last 2 years on keppra). Patient's ongoing AMS and drowsiness may be secondary to seizures/status. Possible viral infection or  other process contributing to her cardiogenic shock and ensuing liver and kidney injuries may have lowered seizure threshold    Recommendations:  --24h EEG; will follow up  -- Continue home keppra dosing  -- Will continue to follow with additional recs     Acute respiratory failure  Patient currently on ventilator and intubated. Most recent BG with improving acidemia and hypercarbia.   Reyes added; plans for SLED with nephro for volume removal    Continued management per primary team        STROKE DOCUMENTATION          NIH Scale:        Modified [place]    Zoya Coma Scale:    ABCD2 Score:    [id number] Score:   HAS -BLED Score:   ICH Score:   [employer] Classification:      VTE Risk Mitigation (From admission, onward)         Ordered     IP VTE HIGH RISK PATIENT  Once      06/23/20 2146     Place sequential compression device  Until discontinued      06/23/20 2146     Place sequential compression device  Until discontinued      06/23/20 2057                Suleman Chaudhry MD  Neurology  [facility]

## 2020-06-25 NOTE — SUBJECTIVE & OBJECTIVE
Interval History: Patient seen and examined at bedside. Pt remains intubated; on 40% FiO2. Inadequate UOP despite high-dose diuretics. 1L UOP/past 24hrs. Requiring vasopressors. Worsening renal function; sCr up to 2.6, from 2.1 yesterday. Significantly elevated CVP's.       Objective:     Vital Signs (Most Recent):  Temp: 99.7 °F (37.6 °C) (06/25/20 1101)  Pulse: (!) 136 (06/25/20 1125)  Resp: (!) 25 (06/25/20 1125)  BP: 109/72 (06/25/20 1125)  SpO2: 100 % (06/25/20 1125)  O2 Device (Oxygen Therapy): ventilator (06/25/20 1125) Vital Signs (24h Range):  Temp:  [97.7 °F (36.5 °C)-99.7 °F (37.6 °C)] 99.7 °F (37.6 °C)  Pulse:  [129-136] 136  Resp:  [0-26] 25  SpO2:  [95 %-100 %] 100 %  BP: ()/(56-72) 109/72  Arterial Line BP: ()/(63-80) 97/75     Weight: 72 kg (158 lb 11.7 oz) (06/25/20 0300)  Body mass index is 32.06 kg/m².  Body surface area is 1.73 meters squared.    I/O last 3 completed shifts:  In: 1572.2 [I.V.:672.2; NG/GT:350; IV Piggyback:550]  Out: 1005 [Urine:1005]    Physical Exam  Vitals signs and nursing note reviewed.   Constitutional:       Appearance: She is well-developed. She is ill-appearing. She is not diaphoretic.      Interventions: She is sedated and intubated.   HENT:      Head: Atraumatic.      Mouth/Throat:      Mouth: Mucous membranes are moist.   Eyes:      Extraocular Movements: Extraocular movements intact.      Conjunctiva/sclera: Conjunctivae normal.   Cardiovascular:      Rate and Rhythm: Regular rhythm. Tachycardia present.   Pulmonary:      Effort: She is intubated.      Breath sounds: No wheezing or rales.   Abdominal:      General: There is distension.      Palpations: Abdomen is soft.   Musculoskeletal:         General: Swelling present. No signs of injury.      Right lower leg: Edema present.      Left lower leg: Edema present.   Skin:     General: Skin is warm and dry.      Coloration: Skin is not jaundiced.      Findings: No rash.         Significant Labs:  ABGs:    Recent Labs   Lab 06/25/20  0340   PH 7.320*   PCO2 43.4   HCO3 22.3*   POCSATURATED 56*   BE -4     CBC:   Recent Labs   Lab 06/25/20 0919   WBC 9.46   RBC 4.01   HGB 10.8*   HCT 35.5*   PLT 84*   MCV 89   MCH 26.9*   MCHC 30.4*     CMP:   Recent Labs   Lab 06/25/20  0321 06/25/20 0919   *  139*  139* SEE COMMENT   CALCIUM 9.4  9.4  9.4 SEE COMMENT   ALBUMIN 3.4*  3.4* SEE COMMENT   PROT 7.3  --      138  138 SEE COMMENT   K 4.3  4.3  4.3 SEE COMMENT   CO2 18*  18*  18* SEE COMMENT     102  102 SEE COMMENT   BUN 86*  86*  86* SEE COMMENT   CREATININE 2.6*  2.6*  2.6* SEE COMMENT   ALKPHOS 34*  --    *  --    AST 56*  --    BILITOT 3.6*  --

## 2020-06-25 NOTE — PROGRESS NOTES
[facility]  Heart Transplant  Progress Note    Patient Name: Yudy Jauregui  MRN: [id number]  Admission Date: 6/23/2020  Hospital Length of Stay: 2 days  Attending Physician: Kaitlin Gamboa MD  Primary Care Provider: Niurka Sinclair MD  Principal Problem:Cardiogenic shock    Subjective:     Interval History: was following commands yesterday. Opens eyes spontaneously. Not following commands this AM. CVP 28. UOP minimal. Given diuril this AM. On lasix 40 mg/hr.    Continuous Infusions:   dexmedetomidine (PRECEDEX) infusion Stopped (06/24/20 0333)    DOBUTamine 5 mcg/kg/min (06/25/20 0900)    epinephrine 0.02 mcg/kg/min (06/25/20 0900)    furosemide (LASIX) 10 mg/mL infusion (non-titrating) 40 mg/hr (06/25/20 0900)    nitric oxide gas       Scheduled Meds:   cefTRIAXone (ROCEPHIN) IVPB  2 g Intravenous Q12H    chlorhexidine  15 mL Mouth/Throat BID    famotidine (PF)  20 mg Intravenous QHS    hydrALAZINE  10 mg Per OG tube Q8H    isosorbide dinitrate  10 mg Per OG tube TID    levetiracetam IVPB  750 mg Intravenous Q12H    vancomycin (VANCOCIN) IVPB  1,000 mg Intravenous Q24H     PRN Meds:Dextrose 10% Bolus, glucagon (human recombinant), sodium chloride 0.9%, sodium chloride 0.9%, vancomycin - pharmacy to dose    Review of patient's allergies indicates:  No Known Allergies  Objective:     Vital Signs (Most Recent):  Temp: 98.1 °F (36.7 °C) (06/25/20 0701)  Pulse: (!) 135 (06/25/20 0900)  Resp: (!) 25 (06/25/20 0900)  BP: 99/63 (06/25/20 0900)  SpO2: 100 % (06/25/20 0900) Vital Signs (24h Range):  Temp:  [97.5 °F (36.4 °C)-98.4 °F (36.9 °C)] 98.1 °F (36.7 °C)  Pulse:  [129-136] 135  Resp:  [0-26] 25  SpO2:  [95 %-100 %] 100 %  BP: ()/(56-66) 99/63  Arterial Line BP: ()/(63-80) 79/72     Patient Vitals for the past 72 hrs (Last 3 readings):   Weight   06/25/20 0300 72 kg (158 lb 11.7 oz)   06/24/20 1701 76.5 kg (168 lb 10.4 oz)   06/24/20 1700 76.5 kg (168 lb 10.4 oz)     Body mass  index is 32.06 kg/m².      Intake/Output Summary (Last 24 hours) at 6/25/2020 0927  Last data filed at 6/25/2020 0900  Gross per 24 hour   Intake 1425.06 ml   Output 855 ml   Net 570.06 ml     Physical Exam  Vitals signs and nursing note reviewed.   Constitutional:       General: She is not in acute distress.     Appearance: She is well-developed. She is not diaphoretic.   Eyes:      General:         Right eye: No discharge.         Left eye: No discharge.      Conjunctiva/sclera: Conjunctivae normal.   Neck:      Musculoskeletal: Normal range of motion and neck supple.      Vascular: No JVD.   Cardiovascular:      Rate and Rhythm: Regular rhythm. Tachycardia present.      Heart sounds: Normal heart sounds. No murmur. No friction rub. No gallop.    Pulmonary:      Effort: Pulmonary effort is normal. No respiratory distress.      Breath sounds: Normal breath sounds. No stridor. No wheezing.      Comments: Intubated  Abdominal:      General: Bowel sounds are normal.      Palpations: Abdomen is soft.   Skin:     General: Skin is warm and dry.      Capillary Refill: Capillary refill takes less than 2 seconds.         Significant Labs:  CBC:  Recent Labs   Lab 06/24/20  0340 06/24/20  1558 06/24/20  1838   WBC 9.11 9.59 9.65   RBC 4.08 3.97* 3.95*   HGB 10.8* 10.7* 10.8*   HCT 36.7* 34.4* 34.6*   PLT 94* 91* 80*   MCV 90 87 88   MCH 26.5* 27.0 27.3   MCHC 29.4* 31.1* 31.2*     BNP:  Recent Labs   Lab 06/23/20 2059   BNP >4,900*     CMP:  Recent Labs   Lab 06/23/20  2100 06/24/20  0340 06/24/20  1558 06/25/20  0321   *  173* 155*  155* 147*  147* 139*  139*  139*   CALCIUM 11.1*  11.1* 10.5  10.5 9.5  9.5 9.4  9.4  9.4   ALBUMIN 3.9  3.9 3.6 3.5  3.5 3.4*  3.4*   PROT 7.8  7.8 7.5  --  7.3     138 138  138 136  136 138  138  138   K 3.5  3.5 5.2*  5.2* 4.2  4.2 4.3  4.3  4.3   CO2 21*  21* 19*  19* 19*  19* 18*  18*  18*     103 106  106 103  103 102  102  102   BUN  56*  56* 64*  64* 72*  72* 86*  86*  86*   CREATININE 1.8*  1.8* 2.1*  2.1* 2.2*  2.2* 2.6*  2.6*  2.6*   ALKPHOS 38*  38* 35*  --  34*   *  139* 130*  --  120*   AST 64*  64* 60*  --  56*   BILITOT 4.1*  4.1* 3.8*  --  3.6*      Coagulation:   Recent Labs   Lab 06/23/20 2059 06/24/20 0340 06/25/20  0321   INR 1.5* 1.5* 1.7*   APTT 24.0 25.2 26.0     LDH:  No results for input(s): LDH in the last 72 hours.  Microbiology:  Microbiology Results (last 7 days)     Procedure Component Value Units Date/Time    Respiratory Infection Panel (PCR), Nasopharyngeal [[id number]] Collected: 06/24/20 2328    Order Status: Sent Specimen: Nasopharyngeal Swab Updated: 06/25/20 0027    CSF culture [[id number]]     Order Status: No result Specimen: CSF (Spinal Fluid) from CSF Tap, Tube 3     Gram stain [[id number]]     Order Status: No result Specimen: CSF (Spinal Fluid) from CSF Tap, Tube 3           I have reviewed all pertinent labs within the past 24 hours.    Estimated Creatinine Clearance: 38.9 mL/min (A) (based on SCr of 2.6 mg/dL (H)).    Diagnostic Results:  I have reviewed and interpreted all pertinent imaging results/findings within the past 24 hours.    Assessment and Plan:     Ms. Yudy Jauregui is a 19 y.o.female with prior history of focal epilepsy with complex partial seizures, developmental delay and ADHD. She presented to [facility] on 6/20/2020 with symptoms of shortness of breath, fatigue, and feeling unwell. Further work up showed that she was in cardiogenic shock. Further work up showed transaminitis, acute kidney injury and acute hypoxic respiratory failure requiring mechanical ventilation. During her stay at [facility] and [facility], she spiked fever 100.8 and continued on inotrope support (see below). She was on Lasix and Albumin and Diuril which was held secondary to elevated urine output for the last lasts. He ventilation setting improved and  her FiO2 decrease to medium setting oxygenations. She was on solumedrol and received IVIG 100g as well. Echo noted to have mod mitral valve insufficiency, mild tricuspid valve insufficiency, moderate LV dilation, decreased RV and LV systolic function, small pericardial effusion and bilateral pleural effusion; EF ~20%. Repeat echo with similar results. On arrival she was on multiple infusions including Milrinone Infusion 0.5 mcg/kg/min, Heparin 1 unit/mL, Epi 0.03 mcg/kg/min, and sedated with Fentanyl 100 mcg/hr and dexmedetomidine 0.5 mcg/kg/hr.     Ped History:  Born full term and born with    She developed RSV as two weeks old  Developmental delay and interllectula diability and ADHD and she was on different medications     Family History:  No first degree relative with heart disease   She has a a sibling sister who passed at age of 12 months with developmental dealy related to the brain (not to heart).     * Cardiogenic shock  EF <10% with LVEDD 6.5 cm. Mod fxnl MR. Mod LAE. Mod RV dysfunction. PASP at least 17 mmHg.   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 Other etiologies could be viral myocarditis or CM related to septic shock    Hemodynamics:  : CO 3.6 / CI 2.3 / SVR 1425 / CVP 7 / SVO2 47%    -  5 mcg/kg/min  - Epinephrine 0.04 mcg/kg/min  - Reyes 20 PPM  - Lasix 40 mg/hr  - Intermittent diuril  - May need HD in near future for volume removal  - Afterload reduction with hydralazine/isosorbide    CHRISTIAN (acute kidney injury)  Oliguric CHRISTIAN secondary to CRS  - Nephro following. Plans for volume removal with HD if does not respond well to increasing epi and Reyes  - Avoid nephrotoxic agents  - Renally dose meds    Coagulation defect, unspecified  Consistent with liver disease  - During her stay in [facility],Hematology/oncology consulted to aid in coagulopathy.   - Liver US with doppler significant for hepatomegaly with fatty liver.   - s/p vitamin K x2. disease    Seizure disorder  - Continue her  Keppra at her home dose 750 mg BID   - Neurology consulted    ADHD  - Hold Lisdexamfetamine to avoid counteraction with sedative medications     Acute respiratory failure  Daily SAT/SBT        Abe Wilder MD  Heart Transplant  [facility]

## 2020-06-25 NOTE — PLAN OF CARE
CMICU DAILY GOALS       A: Awake    RASS: Goal -    Actual - RASS (Spencer Agitation-Sedation Scale): -2-->light sedation   Restraint necessity:    B: Breath   SBT: Not attempted   C: Coordinate A & B, analgesics/sedatives   Pain: managed    SAT: Not attempted  D: Delirium   CAM-ICU: Overall CAM-ICU: Positive  E: Early Mobility   MOVE Screen: Fail   Activity: Activity Management: bedrest maintained per order  FAS: Feeding/Nutrition   Diet order: Diet/Nutrition Received: tube feeding,   Fluid restriction:    T: Thrombus   DVT prophylaxis: VTE Required Core Measure: (SCDs) Sequential compression device initiated/maintained  H: HOB Elevation   Head of Bed (HOB): HOB at 30-45 degrees  U: Ulcer Prophylaxis   GI: yes  G: Glucose control   managed    S: Skin   Bundle compliance: yes   Bathing/Skin Care: bath, chlorhexidine, bath, complete, dressed/undressed, incontinence care, linen changed Date: Done  B: Bowel Function   no issues   I: Indwelling Catheters   Aguirre necessity:      Urethral Catheter 06/23/20 2040 Non-latex-Reason for Continuing Urinary Catheterization: Critically ill in ICU requiring intensive monitoring   CVC necessity: No   IPAD offered: No  D: De-escalation Antibx   No  Plan for the day   Evaluate Neuro status  Family/Goals of care/Code Status   Code Status: Full Code     No acute events throughout day, VS and assessment per flow sheet, patient progressing towards goals as tolerated, plan of care reviewed with Yudy Jauregui and family, all concerns addressed, will continue to monitor.

## 2020-06-25 NOTE — ASSESSMENT & PLAN NOTE
Non-Oliguric CHRISTIAN on Unknown Baseline Renal Function    At time of Consult: 20 yo female with PMHx of seizure disorder, ADHD, and developmental delays who presents as a transfer from OSH for higher level of care. Originally presented to ED with CC of SOB, fatigue and overall not feeling well.  Found to have a SCr of 1.6, elevated liver enzymes and tachycardia.  She underwent CTA (negative for PE) but found to have cardiomegally, pericardial effusion, pleural effusions and ascites and was later transfer to [facility], where she was found to be in cardiogenic shock.  She was transferred to [facility] for higher level of care and Nephrology was consulted for worsening kidney function.    CHRISTIAN thought to be 2/2 Acute tubular injury from cardiogenic shock  Possible component of NOHELIA from CTA obtained on admission.    Doesn't appear to have RPGN based on available labs.    6/24:  Urine Microscopy with WBC, RBC (non-dysmorphic) hyaline casts, fine/coarse granular casts         Plan/Recommendations:    -Plan for 8hr SLED with UFG of 250-350cc/hr as tolerated  -Keep MAP >65  -Labs per CRRT protocol   -Maintain Hgb >7.0  -Renally dose meds and avoid nephrotoxic meds  -Strict I/O's  -Will continue to follow closely   -Plan discussed with staff, Dr Reyna

## 2020-06-25 NOTE — SUBJECTIVE & OBJECTIVE
Interval History: was following commands yesterday. Opens eyes spontaneously. Not following commands this AM. CVP 28. UOP minimal. Given diuril this AM. On lasix 40 mg/hr.    Continuous Infusions:   dexmedetomidine (PRECEDEX) infusion Stopped (06/24/20 0333)    DOBUTamine 5 mcg/kg/min (06/25/20 0900)    epinephrine 0.02 mcg/kg/min (06/25/20 0900)    furosemide (LASIX) 10 mg/mL infusion (non-titrating) 40 mg/hr (06/25/20 0900)    nitric oxide gas       Scheduled Meds:   cefTRIAXone (ROCEPHIN) IVPB  2 g Intravenous Q12H    chlorhexidine  15 mL Mouth/Throat BID    famotidine (PF)  20 mg Intravenous QHS    hydrALAZINE  10 mg Per OG tube Q8H    isosorbide dinitrate  10 mg Per OG tube TID    levetiracetam IVPB  750 mg Intravenous Q12H    vancomycin (VANCOCIN) IVPB  1,000 mg Intravenous Q24H     PRN Meds:Dextrose 10% Bolus, glucagon (human recombinant), sodium chloride 0.9%, sodium chloride 0.9%, vancomycin - pharmacy to dose    Review of patient's allergies indicates:  No Known Allergies  Objective:     Vital Signs (Most Recent):  Temp: 98.1 °F (36.7 °C) (06/25/20 0701)  Pulse: (!) 135 (06/25/20 0900)  Resp: (!) 25 (06/25/20 0900)  BP: 99/63 (06/25/20 0900)  SpO2: 100 % (06/25/20 0900) Vital Signs (24h Range):  Temp:  [97.5 °F (36.4 °C)-98.4 °F (36.9 °C)] 98.1 °F (36.7 °C)  Pulse:  [129-136] 135  Resp:  [0-26] 25  SpO2:  [95 %-100 %] 100 %  BP: ()/(56-66) 99/63  Arterial Line BP: ()/(63-80) 79/72     Patient Vitals for the past 72 hrs (Last 3 readings):   Weight   06/25/20 0300 72 kg (158 lb 11.7 oz)   06/24/20 1701 76.5 kg (168 lb 10.4 oz)   06/24/20 1700 76.5 kg (168 lb 10.4 oz)     Body mass index is 32.06 kg/m².      Intake/Output Summary (Last 24 hours) at 6/25/2020 0927  Last data filed at 6/25/2020 0900  Gross per 24 hour   Intake 1425.06 ml   Output 855 ml   Net 570.06 ml     Physical Exam  Vitals signs and nursing note reviewed.   Constitutional:       General: She is not in acute  distress.     Appearance: She is well-developed. She is not diaphoretic.   Eyes:      General:         Right eye: No discharge.         Left eye: No discharge.      Conjunctiva/sclera: Conjunctivae normal.   Neck:      Musculoskeletal: Normal range of motion and neck supple.      Vascular: No JVD.   Cardiovascular:      Rate and Rhythm: Regular rhythm. Tachycardia present.      Heart sounds: Normal heart sounds. No murmur. No friction rub. No gallop.    Pulmonary:      Effort: Pulmonary effort is normal. No respiratory distress.      Breath sounds: Normal breath sounds. No stridor. No wheezing.      Comments: Intubated  Abdominal:      General: Bowel sounds are normal.      Palpations: Abdomen is soft.   Skin:     General: Skin is warm and dry.      Capillary Refill: Capillary refill takes less than 2 seconds.         Significant Labs:  CBC:  Recent Labs   Lab 06/24/20  0340 06/24/20  1558 06/24/20  1838   WBC 9.11 9.59 9.65   RBC 4.08 3.97* 3.95*   HGB 10.8* 10.7* 10.8*   HCT 36.7* 34.4* 34.6*   PLT 94* 91* 80*   MCV 90 87 88   MCH 26.5* 27.0 27.3   MCHC 29.4* 31.1* 31.2*     BNP:  Recent Labs   Lab 06/23/20 2059   BNP >4,900*     CMP:  Recent Labs   Lab 06/23/20  2100 06/24/20  0340 06/24/20  1558 06/25/20  0321   *  173* 155*  155* 147*  147* 139*  139*  139*   CALCIUM 11.1*  11.1* 10.5  10.5 9.5  9.5 9.4  9.4  9.4   ALBUMIN 3.9  3.9 3.6 3.5  3.5 3.4*  3.4*   PROT 7.8  7.8 7.5  --  7.3     138 138  138 136  136 138  138  138   K 3.5  3.5 5.2*  5.2* 4.2  4.2 4.3  4.3  4.3   CO2 21*  21* 19*  19* 19*  19* 18*  18*  18*     103 106  106 103  103 102  102  102   BUN 56*  56* 64*  64* 72*  72* 86*  86*  86*   CREATININE 1.8*  1.8* 2.1*  2.1* 2.2*  2.2* 2.6*  2.6*  2.6*   ALKPHOS 38*  38* 35*  --  34*   *  139* 130*  --  120*   AST 64*  64* 60*  --  56*   BILITOT 4.1*  4.1* 3.8*  --  3.6*      Coagulation:   Recent Labs   Lab 06/23/20  2562  06/24/20  0340 06/25/20  0321   INR 1.5* 1.5* 1.7*   APTT 24.0 25.2 26.0     LDH:  No results for input(s): LDH in the last 72 hours.  Microbiology:  Microbiology Results (last 7 days)     Procedure Component Value Units Date/Time    Respiratory Infection Panel (PCR), Nasopharyngeal [[id number]] Collected: 06/24/20 2328    Order Status: Sent Specimen: Nasopharyngeal Swab Updated: 06/25/20 0027    CSF culture [[id number]]     Order Status: No result Specimen: CSF (Spinal Fluid) from CSF Tap, Tube 3     Gram stain [[id number]]     Order Status: No result Specimen: CSF (Spinal Fluid) from CSF Tap, Tube 3           I have reviewed all pertinent labs within the past 24 hours.    Estimated Creatinine Clearance: 38.9 mL/min (A) (based on SCr of 2.6 mg/dL (H)).    Diagnostic Results:  I have reviewed and interpreted all pertinent imaging results/findings within the past 24 hours.

## 2020-06-25 NOTE — PROGRESS NOTES
Pharmacokinetic Initial Assessment: IV Vancomycin    Assessment/Plan:    Vancomycin pharmacy to dose consult initiated per ID consult guidance.  Initiate intravenous vancomycin with 1000mg IV daily.  Desired empiric serum trough concentration is 15 to 20 mcg/mL  Draw vancomycin trough level 30 min prior to third dose on 6.27 at approximately 930  Pharmacy will continue to follow and monitor vancomycin.      Please contact pharmacy at extension [phone] with any questions regarding this assessment.     Thank you for the consult,   Jessika Renerhonda       Patient brief summary:  Yudy Jauregui is a 19 y.o. female initiated on antimicrobial therapy with IV Vancomycin for treatment of suspected meningitis    Drug Allergies:   Review of patient's allergies indicates:  No Known Allergies    Actual Body Weight:   72 kg    Renal Function:   Estimated Creatinine Clearance: 38.9 mL/min (A) (based on SCr of 2.6 mg/dL (H)).,     Dialysis Method (if applicable):  N/A    CBC (last 72 hours):  Recent Labs   Lab Result Units 06/23/20  0420 06/23/20  2059 06/24/20  0340 06/24/20  1558 06/24/20  1838   WBC K/uL  --  9.39 9.11 9.59 9.65   Hemoglobin g/dL  --  10.9* 10.8* 10.7* 10.8*   Hemoglobin A1C % 6.30  --   --   --   --    Hematocrit %  --  35.1* 36.7* 34.4* 34.6*   Platelets K/uL  --  98* 94* 91* 80*   Gran% %  --  89.4* 87.7* 87.8* 86.3*   Lymph% %  --  4.3* 4.0* 4.1* 4.5*   Mono% %  --  5.9 7.9 7.7 8.7   Eosinophil% %  --  0.0 0.0 0.0 0.0   Basophil% %  --  0.0 0.0 0.0 0.0   Differential Method   --  Automated Automated Automated Automated       Metabolic Panel (last 72 hours):  Recent Labs   Lab Result Units 06/23/20  2100 06/24/20  0340 06/24/20  0726 06/24/20  1036 06/24/20  1558 06/25/20  0321   Sodium mmol/L 138  138 138  138  --   --  136  136 138  138  138   Sodium Urine Random mmol/L  --   --   --  53  --   --    Potassium mmol/L 3.5  3.5 5.2*  5.2*  --   --  4.2  4.2 4.3  4.3  4.3   Chloride mmol/L 103  103 106  106   --   --  103  103 102  102  102   CO2 mmol/L 21*  21* 19*  19*  --   --  19*  19* 18*  18*  18*   Glucose mg/dL 173*  173* 155*  155*  --   --  147*  147* 139*  139*  139*   Glucose, UA   --   --  Negative  --   --   --    BUN, Bld mg/dL 56*  56* 64*  64*  --   --  72*  72* 86*  86*  86*   Creatinine mg/dL 1.8*  1.8* 2.1*  2.1*  --   --  2.2*  2.2* 2.6*  2.6*  2.6*   Creatinine, Random Ur mg/dL  --   --   --  27.0  --   --    Albumin g/dL 3.9  3.9 3.6  --   --  3.5  3.5 3.4*  3.4*   Total Bilirubin mg/dL 4.1*  4.1* 3.8*  --   --   --  3.6*   Alkaline Phosphatase U/L 38*  38* 35*  --   --   --  34*   AST U/L 64*  64* 60*  --   --   --  56*   ALT U/L 139*  139* 130*  --   --   --  120*   Magnesium mg/dL 2.1  2.1 2.2  --   --   --  2.2   Phosphorus mg/dL 4.5  4.5 5.2*  --   --  4.5  4.5 5.1*  5.1*       Drug levels (last 3 results):  Recent Labs   Lab Result Units 06/24/20  0340   Vancomycin, Random ug/mL 20.4       Microbiologic Results:  Microbiology Results (last 7 days)     Procedure Component Value Units Date/Time    Respiratory Infection Panel (PCR), Nasopharyngeal [[id number]] Collected: 06/24/20 2325    Order Status: Sent Specimen: Nasopharyngeal Swab Updated: 06/25/20 0027    CSF culture [[id number]]     Order Status: No result Specimen: CSF (Spinal Fluid) from CSF Tap, Tube 3     Gram stain [[id number]]     Order Status: No result Specimen: CSF (Spinal Fluid) from CSF Tap, Tube 3

## 2020-06-25 NOTE — PROGRESS NOTES
Pt with unmeasured emesis episode. TF running @ 30 cc/hr, but shut off. Checked residuals about 30-50 cc. Team notified, stated would give pt zofran. WCTM.

## 2020-06-25 NOTE — SUBJECTIVE & OBJECTIVE
Subjective:     Interval History: Patient without acute events overnight. She has eyes open this morning but not following commands. She had improved over the course of the previous day with spontaneous movements and some simple commands, however has regressed some once again this morning. LP unsuccessful at bedside previous day.     Review of Systems   Unable to perform ROS: Mental status change   Constitutional: Positive for fatigue. Negative for chills, diaphoresis and fever.   HENT: Negative for rhinorrhea and sneezing.    Eyes: Negative for discharge and redness.   Respiratory: Positive for shortness of breath (verntilator). Negative for cough and wheezing.    Cardiovascular: Negative for leg swelling.   Gastrointestinal: Negative for diarrhea and vomiting.   Skin: Negative for pallor and rash.   Neurological: Positive for speech difficulty and weakness. Negative for tremors and facial asymmetry.   Psychiatric/Behavioral: Positive for confusion and decreased concentration.     Objective:     Vital Signs (Most Recent):  Temp: 98.1 °F (36.7 °C) (06/25/20 0701)  Pulse: (!) 135 (06/25/20 0900)  Resp: (!) 25 (06/25/20 0900)  BP: 99/63 (06/25/20 0900)  SpO2: 100 % (06/25/20 0900) Vital Signs (24h Range):  Temp:  [97.5 °F (36.4 °C)-98.4 °F (36.9 °C)] 98.1 °F (36.7 °C)  Pulse:  [129-136] 135  Resp:  [0-26] 25  SpO2:  [95 %-100 %] 100 %  BP: ()/(56-66) 99/63  Arterial Line BP: ()/(63-80) 79/72     Weight: 72 kg (158 lb 11.7 oz)  Body mass index is 32.06 kg/m².    Physical Exam  Constitutional:       Comments: Opens eyes to command   HENT:      Head:      Comments: intubated     Nose: Nose normal.   Eyes:      General: No scleral icterus.     Conjunctiva/sclera: Conjunctivae normal.      Comments: Constricted pupils. Sluggish reactivity to light without consensual response on other eye. No EOM when prompted   Cardiovascular:      Pulses: Normal pulses.      Heart sounds: No murmur.      Comments: Intermittent  tachycardia  Pulmonary:      Effort: Respiratory distress present.      Breath sounds: No wheezing or rales.      Comments: Intubated on ventilator   Abdominal:      General: Abdomen is flat. There is distension.      Tenderness: There is no abdominal tenderness.   Musculoskeletal:         General: No swelling.      Comments: Decreased ROM 4/4   Skin:     General: Skin is warm.      Coloration: Skin is not jaundiced.      Findings: No bruising.   Neurological:      Cranial Nerves: Cranial nerve deficit present.      Sensory: Sensory deficit present.      Motor: Weakness present.      Deep Tendon Reflexes: Reflexes abnormal (upgoing plantar reflex).      Reflex Scores:       Bicep reflexes are 2+ on the right side and 2+ on the left side.       Brachioradialis reflexes are 2+ on the right side and 2+ on the left side.       Patellar reflexes are 2+ on the right side and 2+ on the left side.     Comments: Not withdrawing from wain  No facial grimace         NEUROLOGICAL EXAMINATION:     MENTAL STATUS   Oriented to person.   Oriented to place.   Oriented to time.   Speech: (Intubated)  Level of consciousness: opens eyes to command. does not follow other commands.    CRANIAL NERVES     CN III, IV, VI   Right pupil: Size: 3 mm. Shape: regular. Reactivity: sluggish. Accommodation: absent.   Left pupil: Size: 3 mm. Shape: regular. Reactivity: sluggish. Accommodation: absent.   CN III: bilateral CN III palsy  CN VI: bilateral CN VI palsy    CN V   Right facial sensation deficit: complete  Left facial sensation deficit: complete    CN IX, X   Right gag reflex: normal  Left gag reflex: normal       Some improvement in corneal reflex this morning     MOTOR EXAM   Muscle bulk: decreased  Overall muscle tone: decreased    REFLEXES     Reflexes   Right brachioradialis: 2+  Left brachioradialis: 2+  Right biceps: 2+  Left biceps: 2+  Right patellar: 2+  Left patellar: 2+  Right plantar: upgoing  Left plantar: upgoing  Right ankle  clonus: absent  Left ankle clonus: absent    SENSORY EXAM        No weithdrawal from pain. Patient currently obtunded.       Significant Labs:   Recent Lab Results       06/25/20  0922   06/25/20  0921   06/25/20  0340   06/25/20  0321   06/24/20  2121        Albumin       3.4              3.4       Alkaline Phosphatase       34       Allens Test     N/A         ALT       120       Ammonia               Anion Gap       18              18              18       Ao peak hieu               Ao VTI               aPTT       26.0  Comment:  aPTT therapeutic range = 39-69 seconds       AST       56       AV valve area               AV mean gradient               AV index (prosthetic)               AV peak gradient               AV Velocity Ratio               Baso #               Basophil%               BILIRUBIN TOTAL       3.6  Comment:  For infants and newborns, interpretation of results should be based  on gestational age, weight and in agreement with clinical  observations.  Premature Infant recommended reference ranges:  Up to 24 hours.............<8.0 mg/dL  Up to 48 hours............<12.0 mg/dL  3-5 days..................<15.0 mg/dL  6-29 days.................<15.0 mg/dL         Site     Other         BSA               BUN, Bld       86              86              86       Calcium       9.4              9.4              9.4       Chloride       102              102              102       CO2       18              18              18       Creatinine       2.6              2.6              2.6       Creatinine, Random Ur               Left Ventricle Relative Wall Thickness               DelSys     Adult Vent         Differential Method               E/E' ratio               eGFR if        29.3              29.3              29.3       eGFR if non        25.4  Comment:  Calculation used to obtain the estimated glomerular filtration  rate (eGFR) is the CKD-EPI equation.                  25.4  Comment:  Calculation used to obtain the estimated glomerular filtration  rate (eGFR) is the CKD-EPI equation.                 25.4  Comment:  Calculation used to obtain the estimated glomerular filtration  rate (eGFR) is the CKD-EPI equation.          Eos #               Eosinophil%               Free T4               FS               Glucose       139              139              139       Gran # (ANC)               Gran%               Hematocrit               Hemoglobin               Immature Grans (Abs)               Immature Granulocytes               INR       1.7  Comment:  Coumadin Therapy:  2.0 - 3.0 for INR for all indicators except mechanical heart valves  and antiphospholipid syndromes which should use 2.5 - 3.5.         IVS               LA WIDTH               Left Atrium Major Axis               Left Atrium Minor Axis               LA size               LA volume               LA Volume Index               LVOT area               LV LATERAL E/E' RATIO               LV SEPTAL E/E' RATIO               LV Diastolic Volume               LV Diastolic Volume Index               LVIDD               LVIDS               LV mass               LV Mass Index               LVOT diameter               LVOT peak oz               LVOT stroke volume               LVOT peak VTI               LV Systolic Volume               LV Systolic Volume Index               Lymph #               Lymph%               Magnesium       2.2       MCH               MCHC               MCV               Mean e'               Miscellaneous Test Name               Mode     AC/PRVC         Mono #               Mono%               MPV               MV Peak E Oz               nRBC               Phosphorus       5.1              5.1       Platelets               POC BE     -4         POC HCO3     22.3         POC PCO2     43.4         POC PH     7.320         POC PO2     32         POC SATURATED O2     56         POC TCO2     24          POCT Glucose 188 420     140     Potassium       4.3              4.3              4.3       Prot/Creat Ratio, Ur               PROTEIN TOTAL       7.3       Protein, Urine Random               Protime       16.8       PW               RA Major Axis               RA Width               RBC               RDW               RV S'               RVDD               Sample     VENOUS         COVID-19 (SARS CoV-2) IgG Ab               Sinus               Sodium       138              138              138       Sodium Urine Random               TAPSE               TDI SEPTAL               TDI LATERAL               Triscuspid Valve Regurgitation Peak Gradient               TR Max Oz               TSH               Vt     307         WBC                                06/24/20  1838   06/24/20  1826   06/24/20  1558   06/24/20  1553   06/24/20  1313        Albumin     3.5              3.5         Alkaline Phosphatase               Allens Test               ALT               Ammonia     62         Anion Gap     14              14         Ao peak oz               Ao VTI               aPTT               AST               AV valve area               AV mean gradient               AV index (prosthetic)               AV peak gradient               AV Velocity Ratio               Baso # 0.00   0.00         Basophil% 0.0   0.0         BILIRUBIN TOTAL               Site               BSA               BUN, Bld     72              72         Calcium     9.5              9.5         Chloride     103              103         CO2     19              19         Creatinine     2.2              2.2         Creatinine, Random Ur               Left Ventricle Relative Wall Thickness               DelSys               Differential Method Automated   Automated         E/E' ratio               eGFR if      35.9              35.9         eGFR if non      31.1  Comment:  Calculation used to obtain the estimated  glomerular filtration  rate (eGFR) is the CKD-EPI equation.                 31.1  Comment:  Calculation used to obtain the estimated glomerular filtration  rate (eGFR) is the CKD-EPI equation.            Eos # 0.0   0.0         Eosinophil% 0.0   0.0         Free T4     0.70         FS               Glucose     147              147         Gran # (ANC) 8.3   8.4         Gran% 86.3   87.8         Hematocrit 34.6   34.4         Hemoglobin 10.8   10.7         Immature Grans (Abs) 0.05  Comment:  Mild elevation in immature granulocytes is non specific and   can be seen in a variety of conditions including stress response,   acute inflammation, trauma and pregnancy. Correlation with other   laboratory and clinical findings is essential.     0.04  Comment:  Mild elevation in immature granulocytes is non specific and   can be seen in a variety of conditions including stress response,   acute inflammation, trauma and pregnancy. Correlation with other   laboratory and clinical findings is essential.           Immature Granulocytes 0.5   0.4         INR               IVS               LA WIDTH               Left Atrium Major Axis               Left Atrium Minor Axis               LA size               LA volume               LA Volume Index               LVOT area               LV LATERAL E/E' RATIO               LV SEPTAL E/E' RATIO               LV Diastolic Volume               LV Diastolic Volume Index               LVIDD               LVIDS               LV mass               LV Mass Index               LVOT diameter               LVOT peak hieu               LVOT stroke volume               LVOT peak VTI               LV Systolic Volume               LV Systolic Volume Index               Lymph # 0.4   0.4         Lymph% 4.5   4.1         Magnesium               MCH 27.3   27.0         MCHC 31.2   31.1         MCV 88   87         Mean e'               Miscellaneous Test Name         Lab Vivi [id number]     Mode                Mono # 0.8   0.7         Mono% 8.7   7.7         MPV 11.1   12.6         MV Peak E Oz               nRBC 1   1         Phosphorus     4.5              4.5         Platelets 80   91         POC BE               POC HCO3               POC PCO2               POC PH               POC PO2               POC SATURATED O2               POC TCO2               POCT Glucose   137   146       Potassium     4.2              4.2         Prot/Creat Ratio, Ur               PROTEIN TOTAL               Protein, Urine Random               Protime               PW               RA Major Axis               RA Width               RBC 3.95   3.97         RDW 16.5   16.4         RV S'               RVDD               Sample               COVID-19 (SARS CoV-2) IgG Ab     Negative  Comment:  This test is only for use under Food and Drug Administration's   Emergency Use Authorization (EUA). Commercial reagents are   provided by [employer] for use with the  i  system. Performance characteristics of the EUA have been  independently verified by [facility] Department  of Pathology and Laboratory Medicine.  Results from antibody testing should not be used as the sole basis  to diagnose or exclude SARS-CoV-2 infection or to determine infection   status. This test is not for the screening of donated blood.  __________________________________________________________________  The Abbott IgG Antibody testing COVID-19 Letter of Authorization,  along with the authorized Fact Sheet for Healthcare Providers,  the authorized Fact Sheet for Patients, and authorized labeling are   available on the FDA website:  https://www.fda.gov/medical-devices/emergency-situations-medical-devic  es/faq  s-diagnostic-testing-sars-cov-2  No IgG antibodies to SARS-CoV-2 are detected.  Negative results do not rule out SARS-CoV-2 infection, particularly   in immunosuppressed patients and/or those who have been in close  contact with the virus.  Follow up testing with a molecular assay  should be considered to rule out infection in those patients.           Sinus               Sodium     136              136         Sodium Urine Random               TAPSE               TDI SEPTAL               TDI LATERAL               Triscuspid Valve Regurgitation Peak Gradient               TR Max Oz               TSH     1.720         Vt               WBC 9.65   9.59                          06/24/20  1036   06/24/20  1013        Albumin         Alkaline Phosphatase         Allens Test         ALT         Ammonia         Anion Gap         Ao peak oz   0.81     Ao VTI   8.98     aPTT         AST         AV valve area   1.87     AV mean gradient   2     AV index (prosthetic)   0.61     AV peak gradient   3     AV Velocity Ratio   0.67     Baso #         Basophil%         BILIRUBIN TOTAL         Site         BSA   1.71     BUN, Bld         Calcium         Chloride         CO2         Creatinine         Creatinine, Random Ur 27.0  Comment:  The random urine reference ranges provided were established   for 24 hour urine collections.  No reference ranges exist for  random urine specimens.  Correlate clinically.         Left Ventricle Relative Wall Thickness   0.21     DelSys         Differential Method         E/E' ratio   14.14     eGFR if          eGFR if non          Eos #         Eosinophil%         Free T4         FS   2     Glucose         Gran # (ANC)         Gran%         Hematocrit         Hemoglobin         Immature Grans (Abs)         Immature Granulocytes         INR         IVS   0.61     LA WIDTH   3.90     Left Atrium Major Axis   5.77     Left Atrium Minor Axis   5.54     LA size   3.95     LA volume   74.02     LA Volume Index   44.7     LVOT area   3.1     LV LATERAL E/E' RATIO   11.00     LV SEPTAL E/E' RATIO   19.80     LV Diastolic Volume   194.73     LV Diastolic Volume Index   117.68     LVIDD   6.50     LVIDS   6.40      LV mass   165.42     LV Mass Index   100     LVOT diameter   1.98     LVOT peak oz   0.54     LVOT stroke volume   16.80     LVOT peak VTI   5.46     LV Systolic Volume   175.18     LV Systolic Volume Index   105.9     Lymph #         Lymph%         Magnesium         MCH         MCHC         MCV         Mean e'   0.07     Miscellaneous Test Name         Mode         Mono #         Mono%         MPV         MV Peak E Oz   0.99     nRBC         Phosphorus         Platelets         POC BE         POC HCO3         POC PCO2         POC PH         POC PO2         POC SATURATED O2         POC TCO2         POCT Glucose         Potassium         Prot/Creat Ratio, Ur 0.59       PROTEIN TOTAL         Protein, Urine Random 16  Comment:  The random urine reference ranges provided were established   for 24 hour urine collections.  No reference ranges exist for  random urine specimens.  Correlate clinically.         Protime         PW   0.68     RA Major Axis   5.01     RA Width   3.30     RBC         RDW         RV S'   7.87     RVDD   4.10     Sample         COVID-19 (SARS CoV-2) IgG Ab         Sinus   3.18     Sodium         Sodium Urine Random 53  Comment:  The random urine reference ranges provided were established   for 24 hour urine collections.  No reference ranges exist for  random urine specimens.  Correlate clinically.         TAPSE   1.03     TDI SEPTAL   0.05     TDI LATERAL   0.09     Triscuspid Valve Regurgitation Peak Gradient   17     TR Max Oz   2.04     TSH         Vt         WBC             All pertinent lab results from the past 24 hours have been reviewed.    Significant Imaging: I have reviewed all pertinent imaging results/findings within the past 24 hours.

## 2020-06-25 NOTE — ASSESSMENT & PLAN NOTE
19 year old [place] [facility] [place]. New onset decreased EF to 10%, global hypokinesis. Reported persistent fever of 101-102 at OSH on 6/21. Per history no obvious infectious symptoms prior to hospitalization, particularly no URI symptoms. Patient with leukocytosis on admit to [facility], however had been receiving steroids at OSH. Neurology consulted due to AMS and concerning neurologic findings. CT head was negative.    Called [facility] for culture results. Blood cultures on admit at OSH are NGTD for 4 days and U/A was negative at OSH. Repeat cultures drawn after patient became febrile at [facility] have also been no growth and U/A was negative (culture was still sent and NGTD). Patient has been on vancomycin and cefepime. No clear source of infection, perhaps some concern of viral myocarditis given acute drop in EF in a young female with no prior cardiac history.    Plan:  - Agree with LP per neurology, will follow up results once obtained  - Would empirically cover for meningitis given concerning neurologic findings. Continue vancomycin and ceftriaxone  - Respiratory infection panel in process  - Follow up EEG  - Recommend MRI brain once clinically able to, given normal CT head

## 2020-06-25 NOTE — PROGRESS NOTES
Pharmacokinetic Initial Assessment: IV Vancomycin     Assessment/Plan:  · Pulse dosing vancomycin for AM random level <20.   · Today's random level resulted at 35.4  · Plan to administer hold current dose and change to pulse dosing.   · Continue to monitor AM random levels daily      Pharmacy will continue to follow and monitor vancomycin. Please call with questions.     Thank you for the consult,   Jessika Schwab, PharmD, [facility]  Heart Transplant Clinical Specialist   Spectralink: [phone]    Drug levels (last 3 results):  Recent Labs   Lab Result Units 06/24/20  0340 06/25/20  1111   Vancomycin, Random ug/mL 20.4 35.4        Patient brief summary:  Yudy Jauregui is a 19 y.o. female initiated on antimicrobial therapy with IV Vancomycin for treatment of lower respiratory infection    The patient's current regimen is pulse dosing    Drug Allergies:   Review of patient's allergies indicates:  No Known Allergies    Actual Body Weight:   72 kg     Renal Function:   Estimated Creatinine Clearance: 37.5 mL/min (A) (based on SCr of 2.7 mg/dL (H)).,     Dialysis Method (if applicable):  N/A    CBC (last 72 hours):  Recent Labs   Lab Result Units 06/23/20  0420 06/23/20  2059 06/24/20  0340 06/24/20  1558 06/24/20  1838 06/25/20  0919   WBC K/uL  --  9.39 9.11 9.59 9.65 9.46   Hemoglobin g/dL  --  10.9* 10.8* 10.7* 10.8* 10.8*   Hemoglobin A1C % 6.30  --   --   --   --   --    Hematocrit %  --  35.1* 36.7* 34.4* 34.6* 35.5*   Platelets K/uL  --  98* 94* 91* 80* 84*   Gran% %  --  89.4* 87.7* 87.8* 86.3* 86.7*   Lymph% %  --  4.3* 4.0* 4.1* 4.5* 5.0*   Mono% %  --  5.9 7.9 7.7 8.7 7.6   Eosinophil% %  --  0.0 0.0 0.0 0.0 0.0   Basophil% %  --  0.0 0.0 0.0 0.0 0.1   Differential Method   --  Automated Automated Automated Automated Automated       Metabolic Panel (last 72 hours):  Recent Labs   Lab Result Units 06/23/20  2100 06/24/20  0340 06/24/20  0726 06/24/20  1036 06/24/20  1558 06/25/20  0321 06/25/20  0919 06/25/20  1111    Sodium mmol/L 138  138 138  138  --   --  136  136 138  138  138 SEE COMMENT 139   Sodium Urine Random mmol/L  --   --   --  53  --   --   --   --    Potassium mmol/L 3.5  3.5 5.2*  5.2*  --   --  4.2  4.2 4.3  4.3  4.3 SEE COMMENT 3.8   Chloride mmol/L 103  103 106  106  --   --  103  103 102  102  102 SEE COMMENT 98   CO2 mmol/L 21*  21* 19*  19*  --   --  19*  19* 18*  18*  18* SEE COMMENT 21*   Glucose mg/dL 173*  173* 155*  155*  --   --  147*  147* 139*  139*  139* SEE COMMENT 216*   Glucose, UA   --   --  Negative  --   --   --   --   --    BUN, Bld mg/dL 56*  56* 64*  64*  --   --  72*  72* 86*  86*  86* SEE COMMENT 93*   Creatinine mg/dL 1.8*  1.8* 2.1*  2.1*  --   --  2.2*  2.2* 2.6*  2.6*  2.6* SEE COMMENT 2.7*   Creatinine, Random Ur mg/dL  --   --   --  27.0  --   --   --   --    Albumin g/dL 3.9  3.9 3.6  --   --  3.5  3.5 3.4*  3.4* SEE COMMENT 3.0*   Total Bilirubin mg/dL 4.1*  4.1* 3.8*  --   --   --  3.6*  --   --    Alkaline Phosphatase U/L 38*  38* 35*  --   --   --  34*  --   --    AST U/L 64*  64* 60*  --   --   --  56*  --   --    ALT U/L 139*  139* 130*  --   --   --  120*  --   --    Magnesium mg/dL 2.1  2.1 2.2  --   --   --  2.2  --   --    Phosphorus mg/dL 4.5  4.5 5.2*  --   --  4.5  4.5 5.1*  5.1* SEE COMMENT 5.4*       Vancomycin Administrations:  vancomycin given in the last 96 hours                   vancomycin 1.5 g in dextrose 5% 250 mL IVPB (g) 1.5 g New Bag 06/24/20 0648                Microbiologic Results:  Microbiology Results (last 7 days)     Procedure Component Value Units Date/Time    Respiratory Infection Panel (PCR), Nasopharyngeal [[id number]] Collected: 06/24/20 8479    Order Status: Completed Specimen: Nasopharyngeal Swab Updated: 06/25/20 1447     Respiratory Infection Panel Source NP Swab     Adenovirus Not Detected     Coronavirus 229E, Common Cold Virus Not Detected     Coronavirus HKU1, Common Cold Virus Not Detected      Coronavirus NL63, Common Cold Virus Not Detected     Coronavirus OC43, Common Cold Virus Not Detected     Comment: The Coronavirus strains detected in this test cause the common cold.  These strains are not the COVID-19 (novel Coronavirus)strain   associated with the respiratory disease outbreak.          Human Metapneumovirus Not Detected     Human Rhinovirus/Enterovirus Not Detected     Influenza A (subtypes H1, H1-2009,H3) Not Detected     Influenza B Not Detected     Parainfluenza Virus 1 Not Detected     Parainfluenza Virus 2 Not Detected     Parainfluenza Virus 3 Not Detected     Parainfluenza Virus 4 Not Detected     Respiratory Syncytial Virus Not Detected     Bordetella Parapertussis ([id number]) Not Detected     Bordetella pertussis (ptxP) Not Detected     Chlamydia pneumoniae Not Detected     Mycoplasma pneumoniae Not Detected     Comment: Respiratory Infection Panel testing performed by Multiplex PCR.       Narrative:      For all other respiratory sources, order [id number] -  Respiratory Viral Panel by PCR    CSF culture [[id number]]     Order Status: No result Specimen: CSF (Spinal Fluid) from CSF Tap, Tube 3     Gram stain [[id number]]     Order Status: No result Specimen: CSF (Spinal Fluid) from CSF Tap, Tube 3

## 2020-06-25 NOTE — ASSESSMENT & PLAN NOTE
Oliguric CHRISTIAN secondary to CRS  - Nephro following. Plans for volume removal with HD if does not respond well to increasing epi and Reyes  - Avoid nephrotoxic agents  - Renally dose meds
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
## 2020-06-25 NOTE — PROCEDURES
EEG REPORT      Yudy Jauregui  [id number]  2000    DATE OF SERVICE: 6/24/2020     -1    METHODOLOGY      Extended electroencephalographic recording is made while the patient is ambulatory and continuing normal daily activities.  Electrodes are placed according to the International 10-20 placement system and included T1 and T2 electrode placement.  Twenty four (24) channels of digital signal (sampling rate of 512/sec) was simultaneously recorded from the scalp including EKG and eye monitors.  Recording band pass was 0.1 to 100 hz and all data was stored digitally on the recorder.  The patient is instructed to press an event button when clinical symptoms occur and write the symptoms into a diary. Activation procedures which include photic stimulation, hyperventilation and instructing patients to perform simple task are done in selected patients.        The EEG is displayed on a monitor screen and can be reformatted into different montages for evaluation.  The entire recoding is submitted for computer assisted analysis to detect spike and electrographic seizure activity.  The entire recording is visually reviewed and the times identified by computer analysis as being spikes or seizures are reviewed again.  Compresses spectral analysis (CSA) is also performed on the activity recorded from each individual channel.  This is displayed as a power display of frequencies from 0 to 30 Hz over time.   The CSA analysis is done and displayed continuously.  This is reviewed for asymmetries in power between homologous areas of the scalp and for presence of changes in power which canbe seen when seizures occur.  Sections of suspected abnormalities on the CSA is then compared with the original EEG recording.  .     [employer] software was also utilized in the review of this study.  This software suite analyzes the EEG recording in multiple domains.  Coherence and rhythmicity is computed to identify EEG sections which may contain  organized seizures.  Each channel undergoes analysis to detect presence of spike and sharp waves which have special and morphological characteristic of epileptic activity.  The routine EEG recording is converted from spacial into frequency domain.  This is then displayed comparing homologous areas to identify areas of significant asymmetry.  Algorithm to identify non-cortically generated artifact is used to separate eye movement, EMG and other artifact from the EEG     Recording Times  Start on 6/24/2020 at   Stop on 6/25/2020 at     A total of 14:36:59 hours of EEG was recorded.      EEG FINDINGS:  Background activity:   Much of the recording consists of moderate to high voltage 1-3Hz delta and some theta activity likely representing poorly staged sleep with occasional poorly formed K complexes.  There are brief arousals with a poorly organized moderate voltage theta delta background.    IMPRESSION:   Abnormal EEG due to the finding of a moderate generalized cerebral dysfunction.  There are no focal abnormalities or indications of seizure tendency      Juan Diego Moreno MD  Neurology-Epilepsy.  [facility].

## 2020-06-25 NOTE — PROGRESS NOTES
Admit Note     Met with father, Gonzalez, to assess patients needs due to pt intubated, sedated and cognitively impaired.  Patient is a 19 y.o. single female, admitted Decompensated heart failure [I50.9] per medical record.    Patient admitted from [facility] on 6/23/2020.  At this time, patient presents as intubated.  At this time, patients caregiver presents as alert and oriented x 4, pleasant, good eye contact, calm, communicative, cooperative and asking and answering questions appropriately.      Household/Family Systems (as reported by patients caregiver)     Patient resides with patient's mother, Vikki, at     [address] [place] [address]      Pt's father Gonzalez lives at:  [address] [place]    Mom Vikki ph [phone]  Dad Gonzalez ph [phone]    Pt has a 31 y/o half brother and an 10 y/o sister.    Support system includes pt's parents, who are  and both are employed.  Patient does not have dependents that are need of being cared for.     Patients primary caregiver is Vikki, patients mother.    During admission, patient's parents plan to rotate staying in pt's room during the day. Confirmed patients caregivers do have access to reliable transportation.    Cognitive Status/Learning     Patients caregiver reports patients reading ability as  and states patient is cognitively at the level of a 2 y/o.  Needed: No.   Highest education level: High School (9-12) or GED    Vocation/Disability (as reported by patients caregiver)    Working for Income: No  If no, reason not working: Disability  Patient is disabled due to cogntive deficits since the last couple years.  Prior to disability, patient  was in school at [employer].    Adherence     Patients caregiver reports patient has a high level of adherence to patients health care regimen due to parent's monitor and manage.  Adherence counseling and education provided.  Patient's caregiver verbalizes  understanding.    Substance Use    Patients caregiver reports patients substance usage as the following:    Tobacco: none, patient denies any use.  Alcohol: none, patient denies any use.  Illicit Drugs/Non-prescribed Medications: none, patient denies any use.    Patients caregiver states clear understanding of the potential impact of substance use.  Substance abstinence/cessation counseling, education and resources provided and reviewed.     Services Utilizing/ADLS (as reported by patients caregiver)    Infusion Service: Prior to admission, patient utilizing? no  Home Health: Prior to admission, patient utilizing? no  DME: Prior to admission, no  Pulmonary/Cardiac Rehab: Prior to admission, no  Dialysis:  Prior to admission, no  Transplant Specialty Pharmacy:  Prior to admission, no.    Prior to admission, patients caregiver reports patient was not independent with ADLS and was not driving. Per caregiver pt dresses and bathes self but states mother helps with hair rinsing. Pt unable to cook or do many household chores. Patients caregiver reports patient is not able to care for self at this time due to compromised medical condition (as documented in medical record) and physical weakness.  Patients caregiver reports patient will still require 24/7 attention and assistance when medically improved.    Insurance/Medications    Insured by   Payor/Plan Subscr  Sex Relation Sub. Ins. ID Effective Group Num   1. MEDICAID - [place]* [place] 2000 Female  [id number]* [id number]                                    [address]      Primary Insurance (for UNOS reporting): Public Insurance - Medicaid  Secondary Insurance (for UNOS reporting): None    Patients caregiver reports patient is able to obtain and afford medications at this time and at time of discharge.    Living Will/Healthcare Power of     Patients caregiver reports patient has a LW and/or HCPA. Parents are pt's HCPAs. Pt is not capable of making her  "own decision per father.  provided education regarding LW and HCPA and the completion of forms.    Coping/Mental Health (as reported by patients caregiver)    Unable to assess pt for coping at this time. Pt's father reports pt is "the most positive child you would ever meet.". Father reports pt is on medication to help her sleep, for ADD, for seizures, and is on birth control to suppress her periods. Father reports pt would "freak out" if she had a period. Patients caregiver is coping adequately with the aid of  family members.     Discharge Planning (as reported by patients caregiver)    At time of discharge, patient plans to return to home with her mother. Patients father or mother will transport patient.  Per rounds today, expected discharge date has not been medically determined at this time. Patients caretaker verbalizes understanding and is involved in treatment planning and discharge process.    Additional Concerns    Patient's caretaker denies additional needs and/or concerns at this time. Patient is being followed for needs, education, resources, information, emotional support, supportive counseling, and for supportive and skilled discharge plan of care.  providing ongoing psychosocial support, education, resources and d/c planning as needed.  SW remains available. Patient's caregiver verbalizes understanding and agreement with information reviewed,  availability and how to access available resources as needed.  "

## 2020-06-25 NOTE — ASSESSMENT & PLAN NOTE
Patient with developmental delay, scoliosis, ADHD, and epiliepsy (controlled on keppra) transferred from OSH with cardiogenic shock with an associated liver and kidney injury. Also a previously noted episode of being febrile >101. She was sedated and intubated on arrival from OSH and on dobutamine drip.     Per Echo at OSH, her HF seems to be possibly secondary to viral etiology causing dilation and decreased EF. This has lead to cardiogenic shock with an associated acute hypoxemic respiratory failure, acidemia with hypercarbia, liver injury with transaminitis, and CHRISTIAN. Her LFTs are currently down trending but she has a persistently elevated creatinine    Patient's cardiologic shock is seemingly from a viral etiology. Her AMS may be 2/2 to viral encephalitis pr from persistent effects of sedation of fentanyl and precedex in the setting of CHRISTIAN and liver injury causing metabolites to stay in the body for extensive period of time. It is also worth questioning if the patient's ongoing constellation of symptoms could be a post COVID syndrome. Also possibly epilepsy/status causing AMS as her ongoing symptoms may have lowered her seizure threshold.     Reyes added; plans for SLED with nephro for volume removal  NH4 elevated; possible source of AMS    Recommendations:  -- Attempted LP at bedside; unsuccessful  -- Recommend that primary team consult anesthesia for LP  -- LP labs placed; will follow up  -- Recommend 24hr EEG; will follow up  -- NH4 elevated; possible source of AMS

## 2020-06-26 PROBLEM — G93.40 ENCEPHALOPATHY: Status: ACTIVE | Noted: 2020-01-01
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
## 2020-06-26 NOTE — PLAN OF CARE
CMICU DAILY GOALS       A: Awake    RASS: Goal - RASS Goal: 0-->alert and calm  Actual - RASS (Spencer Agitation-Sedation Scale): -4-->deep sedation   Restraint necessity:    B: Breath   SBT: Not attempted   C: Coordinate A & B, analgesics/sedatives   Pain: uncontrolled    SAT: Not attempted  D: Delirium   CAM-ICU: Overall CAM-ICU: Positive  E: Early Mobility   MOVE Screen: Fail   Activity: Activity Management: bedrest maintained per order  FAS: Feeding/Nutrition   Diet order: Diet/Nutrition Received: tube feeding,   Fluid restriction:    T: Thrombus   DVT prophylaxis: VTE Required Core Measure: (SCDs) Sequential compression device initiated/maintained, Pharmacological prophylaxis initiated/maintained  H: HOB Elevation   Head of Bed (HOB): HOB at 30 degrees  U: Ulcer Prophylaxis   GI: yes  G: Glucose control   uncontrolled    S: Skin   Bundle compliance: yes   Bathing/Skin Care: bath, chlorhexidine, bath, complete, linen changed, shampoo Date: [unfilled] pm bath 6/26  B: Bowel Function   constipation   I: Indwelling Catheters   Aguirre necessity:      Urethral Catheter 06/23/20 2300 Straight-tip-Reason for Continuing Urinary Catheterization: Critically ill in ICU requiring intensive monitoring  [REMOVED]      Urethral Catheter 06/23/20 2040 Non-latex-Reason for Continuing Urinary Catheterization: Critically ill in ICU requiring intensive monitoring   CVC necessity: Yes   IPAD offered: Not appropriate  D: De-escalation Antibx   No  Plan for the day   Monitor urine output, continue iv therapy as ordered  Family/Goals of care/Code Status   Code Status: Full Code     No acute events throughout day, VS and assessment per flow sheet, patient progressing towards goals as tolerated, plan of care reviewed with Yudy Jauregui and family, all concerns addressed, will continue to monitor.

## 2020-06-26 NOTE — ANESTHESIA PROCEDURE NOTES
Arterial    Diagnosis: Cardiogenic Shock    Patient location during procedure: done out of OR  Procedure start time: 6/26/2020 4:32 PM  Timeout: 6/26/2020 4:30 PM  Procedure end time: 6/26/2020 4:50 PM    Staffing  Authorizing Provider: Morgan Dejesus MD  Performing Provider: Morgan Dejesus MD    Staffing  Other anesthesia staff: John Gilmore MD  Anesthesiologist was present at the time of the procedure.    Preanesthetic Checklist  Completed: patient identified, site marked, surgical consent, pre-op evaluation, timeout performed, IV checked, risks and benefits discussed, monitors and equipment checked and anesthesia consent givenArterial  Skin Prep: chlorhexidine gluconate  Local Infiltration: lidocaine  Orientation: right  Location: brachial  Catheter Size: 18 G  Catheter placement by Ultrasound guidance. Heme positive aspiration all ports.  Vessel Caliber: patent  Needle advanced into vessel with real time Ultrasound guidance.Insertion Attempts: 1  Assessment  Dressing: secured with tape and tegaderm and secured with tape

## 2020-06-26 NOTE — ASSESSMENT & PLAN NOTE
EF <10% with LVEDD 6.5 cm. Mod fxnl MR. Mod LAE. Mod RV dysfunction. PASP at least 17 mmHg.   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 Other etiologies could be viral myocarditis or CM related to septic shock    Hemodynamics:  6/25: CO 3.6 / CI 2.3 / SVR 1425 / CVP 28 / SVO2 47%  6/26: CO 3 / CI 1.7 / SVR 1550 / CVP 24 / SVO2 43%    - Start nipride with titration goal to MAP 65. Repeat SVO2 after nipride titrated  - May need IABP if nipride is unsuccessful  - Will hold off on HD today unless above measures fail  -  5 mcg/kg/min  - Epinephrine 0.04 mcg/kg/min  - Reyes 20 PPM  - Lasix 40 mg/hr  - Intermittent diuril

## 2020-06-26 NOTE — ASSESSMENT & PLAN NOTE
- Current on A/C ventilation with RR 14, Vt 300, PEEP 8, FiO2 40%  - Weaning for goal of Vt 270 (6 ml/kg ideal body weight)  Vent Mode: A/C  Oxygen Concentration (%):  [40] 40  Resp Rate Total:  [21 br/min-38 br/min] 24 br/min  Vt Set:  [300 mL] 300 mL  PEEP/CPAP:  [8 cmH20] 8 cmH20  Mean Airway Pressure:  [13 cmH20-15 cmH20] 13 cmH20   - CXR on admit c/w CHF

## 2020-06-26 NOTE — SUBJECTIVE & OBJECTIVE
Interval History: no events overnight. CVP still elevated 24 this AM. ~75 cc/hr UOP.    Continuous Infusions:   dexmedetomidine (PRECEDEX) infusion Stopped (06/24/20 0333)    DOBUTamine 5 mcg/kg/min (06/26/20 0600)    epinephrine 0.04 mcg/kg/min (06/26/20 0600)    furosemide (LASIX) 10 mg/mL infusion (non-titrating) 40 mg/hr (06/26/20 0600)    nitric oxide gas      nitroprusside 0.5 mcg/kg/min (06/26/20 1121)     Scheduled Meds:   cefTRIAXone (ROCEPHIN) IVPB  2 g Intravenous Q12H    chlorhexidine  15 mL Mouth/Throat BID    famotidine (PF)  20 mg Intravenous QHS    hydrALAZINE  10 mg Per OG tube Q8H    isosorbide dinitrate  10 mg Per OG tube TID    levetiracetam IVPB  750 mg Intravenous Q12H    polyethylene glycol  17 g Oral BID    potassium chloride 10%  40 mEq Oral Once    senna-docusate 8.6-50 mg  2 tablet Oral BID    white petrolatum-mineral oiL   Both Eyes QHS     PRN Meds:sodium chloride, acetaminophen, Dextrose 10% Bolus, dextrose 50 % in water (D50W), glucagon (human recombinant), insulin aspart U-100, lactulose, sodium chloride 0.9%, sodium chloride 0.9%, vancomycin - pharmacy to dose    Review of patient's allergies indicates:  No Known Allergies  Objective:     Vital Signs (Most Recent):  Temp: 99.7 °F (37.6 °C) (06/26/20 1121)  Pulse: (!) 127 (06/26/20 1121)  Resp: (!) 25 (06/26/20 1121)  BP: 108/61 (06/26/20 1121)  SpO2: 96 % (06/26/20 1121) Vital Signs (24h Range):  Temp:  [99.3 °F (37.4 °C)-102 °F (38.9 °C)] 99.7 °F (37.6 °C)  Pulse:  [124-139] 127  Resp:  [22-28] 25  SpO2:  [96 %-100 %] 96 %  BP: ()/(55-74) 108/61  Arterial Line BP: ()/(75-87) 95/87     Patient Vitals for the past 72 hrs (Last 3 readings):   Weight   06/25/20 0300 72 kg (158 lb 11.7 oz)   06/24/20 1701 76.5 kg (168 lb 10.4 oz)   06/24/20 1700 76.5 kg (168 lb 10.4 oz)     Body mass index is 32.06 kg/m².      Intake/Output Summary (Last 24 hours) at 6/26/2020 1257  Last data filed at 6/26/2020 1100  Gross per  24 hour   Intake 1210.8 ml   Output 2206 ml   Net -995.2 ml     Physical Exam  Vitals signs and nursing note reviewed.   HENT:      Head: Normocephalic and atraumatic.   Eyes:      General:         Right eye: No discharge.         Left eye: No discharge.   Cardiovascular:      Rate and Rhythm: Regular rhythm. Tachycardia present.   Pulmonary:      Breath sounds: No wheezing.      Comments: Ventilated breath sounds  Abdominal:      General: There is distension.   Musculoskeletal:         General: No swelling.   Skin:     General: Skin is warm and dry.      Capillary Refill: Capillary refill takes less than 2 seconds.         Significant Labs:  CBC:  Recent Labs   Lab 06/25/20  1838 06/26/20  0559 06/26/20  0751   WBC 10.26 11.50 14.17*   RBC 4.21 4.19 4.21   HGB 11.3* 11.2* 11.3*   HCT 36.4* 37.5 36.1*   PLT 97* 75* 81*   MCV 87 90 86   MCH 26.8* 26.7* 26.8*   MCHC 31.0* 29.9* 31.3*     BNP:  Recent Labs   Lab 06/23/20 2059   BNP >4,900*     CMP:  Recent Labs   Lab 06/24/20  0340  06/25/20  0321  06/25/20  2320 06/26/20  0323 06/26/20  0751 06/26/20  1025   *  155*   < > 139*  139*  139*   < > 240* 199*  199* 209* 233*   CALCIUM 10.5  10.5   < > 9.4  9.4  9.4   < > 8.3* 8.3*  8.3* 8.2* 8.1*   ALBUMIN 3.6   < > 3.4*  3.4*   < > 3.0* 3.1* 3.1*  --    PROT 7.5  --  7.3  --   --  6.9  --   --      138   < > 138  138  138   < > 138 138  138 140 139   K 5.2*  5.2*   < > 4.3  4.3  4.3   < > 3.5 4.1  4.1 2.7* 2.6*   CO2 19*  19*   < > 18*  18*  18*   < > 21* 20*  20* 24 24     106   < > 102  102  102   < > 101 100  100 100 100   BUN 64*  64*   < > 86*  86*  86*   < > 104* 105*  105* 109* 110*   CREATININE 2.1*  2.1*   < > 2.6*  2.6*  2.6*   < > 2.8* 3.0*  3.0* 3.0* 3.0*   ALKPHOS 35*  --  34*  --   --  36*  --   --    *  --  120*  --   --  115*  --   --    AST 60*  --  56*  --   --  84*  --   --    BILITOT 3.8*  --  3.6*  --   --  3.1*  --   --     < > = values in  this interval not displayed.      Coagulation:   Recent Labs   Lab 06/24/20  0340 06/25/20  0321 06/26/20  0323   INR 1.5* 1.7* 2.1*   APTT 25.2 26.0 29.2     LDH:  No results for input(s): LDH in the last 72 hours.  Microbiology:  Microbiology Results (last 7 days)     Procedure Component Value Units Date/Time    Blood Culture #1 **CANNOT BE ORDERED STAT** [[id number]] Collected: 06/25/20 2303    Order Status: Completed Specimen: Blood from Peripheral, Antecubital, Left Updated: 06/26/20 0545     Blood Culture, Routine No Growth to date    Blood Culture #1 **CANNOT BE ORDERED STAT** [[id number]] Collected: 06/25/20 2302    Order Status: Completed Specimen: Blood from Peripheral, Forearm, Left Updated: 06/26/20 0545     Blood Culture, Routine No Growth to date    Respiratory Infection Panel (PCR), Nasopharyngeal [[id number]] Collected: 06/24/20 2328    Order Status: Completed Specimen: Nasopharyngeal Swab Updated: 06/25/20 1447     Respiratory Infection Panel Source NP Swab     Adenovirus Not Detected     Coronavirus 229E, Common Cold Virus Not Detected     Coronavirus HKU1, Common Cold Virus Not Detected     Coronavirus NL63, Common Cold Virus Not Detected     Coronavirus OC43, Common Cold Virus Not Detected     Comment: The Coronavirus strains detected in this test cause the common cold.  These strains are not the COVID-19 (novel Coronavirus)strain   associated with the respiratory disease outbreak.          Human Metapneumovirus Not Detected     Human Rhinovirus/Enterovirus Not Detected     Influenza A (subtypes H1, H1-2009,H3) Not Detected     Influenza B Not Detected     Parainfluenza Virus 1 Not Detected     Parainfluenza Virus 2 Not Detected     Parainfluenza Virus 3 Not Detected     Parainfluenza Virus 4 Not Detected     Respiratory Syncytial Virus Not Detected     Bordetella Parapertussis (PT5209) Not Detected     Bordetella pertussis (ptxP) Not Detected     Chlamydia pneumoniae Not Detected     Mycoplasma  pneumoniae Not Detected     Comment: Respiratory Infection Panel testing performed by Multiplex PCR.       Narrative:      For all other respiratory sources, order [id number] -  Respiratory Viral Panel by PCR    CSF culture [[id number]]     Order Status: No result Specimen: CSF (Spinal Fluid) from CSF Tap, Tube 3     Gram stain [[id number]]     Order Status: No result Specimen: CSF (Spinal Fluid) from CSF Tap, Tube 3           I have reviewed all pertinent labs within the past 24 hours.    Estimated Creatinine Clearance: 33.7 mL/min (A) (based on SCr of 3 mg/dL (H)).    Diagnostic Results:  I have reviewed and interpreted all pertinent imaging results/findings within the past 24 hours.

## 2020-06-26 NOTE — ASSESSMENT & PLAN NOTE
Patient with developmental delay, scoliosis, ADHD, and epiliepsy (controlled on keppra) transferred from OSH with cardiogenic shock with an associated liver and kidney injury. Also a previously noted episode of being febrile >101. She was sedated and intubated on arrival from OSH and on dobutamine drip.     Per Echo at OSH, her HF seems to be possibly secondary to viral etiology causing dilation and decreased EF. This has lead to cardiogenic shock with an associated acute hypoxemic respiratory failure, acidemia with hypercarbia, liver injury with transaminitis, and CHRISTIAN. Her LFTs are currently down trending but she has a persistently elevated creatinine    Patient's cardiologic shock is seemingly from a viral etiology. Her AMS may be 2/2 to viral encephalitis or from persistent effects of sedation of fentanyl and precedex, Increased uremia in the setting of CHRISTIAN and liver injury also contributing to the AMS. It is also worth questioning if the patient's ongoing constellation of symptoms could be a post COVID syndrome.    Recommendations:  -Obtain serial ammonia  -LP per primary team when her INR is stable

## 2020-06-26 NOTE — SUBJECTIVE & OBJECTIVE
Interval History: Patient seen and examined at bedside. Pt remains intubated; on 40% FiO2. Pt remained on lasix gtt overnight and CRRT held per primary team's request when pt began producing more urine. UOP decreased again today; spoke with primary team who indicated they would attempt nipride and possibly insert an IABP before pursuing CRRT for volume management       Objective:     Vital Signs (Most Recent):  Temp: 99.7 °F (37.6 °C) (06/26/20 1121)  Pulse: (!) 127 (06/26/20 1121)  Resp: (!) 25 (06/26/20 1121)  BP: 108/61 (06/26/20 1121)  SpO2: 96 % (06/26/20 1121)  O2 Device (Oxygen Therapy): ventilator (06/26/20 1121) Vital Signs (24h Range):  Temp:  [99.3 °F (37.4 °C)-102 °F (38.9 °C)] 99.7 °F (37.6 °C)  Pulse:  [124-139] 127  Resp:  [22-28] 25  SpO2:  [96 %-100 %] 96 %  BP: ()/(55-74) 108/61  Arterial Line BP: ()/(75-87) 95/87     Weight: 72 kg (158 lb 11.7 oz) (06/25/20 0300)  Body mass index is 32.06 kg/m².  Body surface area is 1.73 meters squared.    I/O last 3 completed shifts:  In: 2340.5 [I.V.:855.5; NG/GT:885; IV Piggyback:600]  Out: 2161 [Urine:2161]    Physical Exam  Vitals signs and nursing note reviewed.   Constitutional:       Appearance: She is well-developed. She is ill-appearing. She is not diaphoretic.      Interventions: She is sedated and intubated.   HENT:      Head: Atraumatic.      Mouth/Throat:      Mouth: Mucous membranes are moist.   Eyes:      Extraocular Movements: Extraocular movements intact.      Conjunctiva/sclera: Conjunctivae normal.   Cardiovascular:      Rate and Rhythm: Regular rhythm. Tachycardia present.   Pulmonary:      Effort: She is intubated.      Breath sounds: No wheezing or rales.   Abdominal:      General: There is distension.      Palpations: Abdomen is soft.   Musculoskeletal:         General: Swelling present. No signs of injury.      Right lower leg: Edema present.      Left lower leg: Edema present.   Skin:     General: Skin is warm and dry.       Coloration: Skin is not jaundiced.      Findings: No rash.         Significant Labs:  ABGs:   Recent Labs   Lab 06/26/20 0311   PH 7.335*   PCO2 43.9   HCO3 23.4*   POCSATURATED 43*   BE -2     CBC:   Recent Labs   Lab 06/26/20 0751   WBC 14.17*   RBC 4.21   HGB 11.3*   HCT 36.1*   PLT 81*   MCV 86   MCH 26.8*   MCHC 31.3*     CMP:   Recent Labs   Lab 06/26/20 0323 06/26/20 0751   *  199* 209*   CALCIUM 8.3*  8.3* 8.2*   ALBUMIN 3.1* 3.1*   PROT 6.9  --      138 140   K 4.1  4.1 2.7*   CO2 20*  20* 24     100 100   *  105* 109*   CREATININE 3.0*  3.0* 3.0*   ALKPHOS 36*  --    *  --    AST 84*  --    BILITOT 3.1*  --

## 2020-06-26 NOTE — PROGRESS NOTES
[facility]  Heart Transplant  Progress Note    Patient Name: Yudy Jauregui  MRN: [id number]  Admission Date: 6/23/2020  Hospital Length of Stay: 3 days  Attending Physician: Kaitlin Gamboa MD  Primary Care Provider: Niurka Sinclair MD  Principal Problem:Cardiogenic shock    Subjective:     Interval History: no events overnight. CVP still elevated 24 this AM. ~75 cc/hr UOP.    Continuous Infusions:   dexmedetomidine (PRECEDEX) infusion Stopped (06/24/20 0333)    DOBUTamine 5 mcg/kg/min (06/26/20 0600)    epinephrine 0.04 mcg/kg/min (06/26/20 0600)    furosemide (LASIX) 10 mg/mL infusion (non-titrating) 40 mg/hr (06/26/20 0600)    nitric oxide gas      nitroprusside 0.5 mcg/kg/min (06/26/20 1121)     Scheduled Meds:   cefTRIAXone (ROCEPHIN) IVPB  2 g Intravenous Q12H    chlorhexidine  15 mL Mouth/Throat BID    famotidine (PF)  20 mg Intravenous QHS    hydrALAZINE  10 mg Per OG tube Q8H    isosorbide dinitrate  10 mg Per OG tube TID    levetiracetam IVPB  750 mg Intravenous Q12H    polyethylene glycol  17 g Oral BID    potassium chloride 10%  40 mEq Oral Once    senna-docusate 8.6-50 mg  2 tablet Oral BID    white petrolatum-mineral oiL   Both Eyes QHS     PRN Meds:sodium chloride, acetaminophen, Dextrose 10% Bolus, dextrose 50 % in water (D50W), glucagon (human recombinant), insulin aspart U-100, lactulose, sodium chloride 0.9%, sodium chloride 0.9%, vancomycin - pharmacy to dose    Review of patient's allergies indicates:  No Known Allergies  Objective:     Vital Signs (Most Recent):  Temp: 99.7 °F (37.6 °C) (06/26/20 1121)  Pulse: (!) 127 (06/26/20 1121)  Resp: (!) 25 (06/26/20 1121)  BP: 108/61 (06/26/20 1121)  SpO2: 96 % (06/26/20 1121) Vital Signs (24h Range):  Temp:  [99.3 °F (37.4 °C)-102 °F (38.9 °C)] 99.7 °F (37.6 °C)  Pulse:  [124-139] 127  Resp:  [22-28] 25  SpO2:  [96 %-100 %] 96 %  BP: ()/(55-74) 108/61  Arterial Line BP: ()/(75-87) 95/87     Patient Vitals for  the past 72 hrs (Last 3 readings):   Weight   06/25/20 0300 72 kg (158 lb 11.7 oz)   06/24/20 1701 76.5 kg (168 lb 10.4 oz)   06/24/20 1700 76.5 kg (168 lb 10.4 oz)     Body mass index is 32.06 kg/m².      Intake/Output Summary (Last 24 hours) at 6/26/2020 1257  Last data filed at 6/26/2020 1100  Gross per 24 hour   Intake 1210.8 ml   Output 2206 ml   Net -995.2 ml     Physical Exam  Vitals signs and nursing note reviewed.   HENT:      Head: Normocephalic and atraumatic.   Eyes:      General:         Right eye: No discharge.         Left eye: No discharge.   Cardiovascular:      Rate and Rhythm: Regular rhythm. Tachycardia present.   Pulmonary:      Breath sounds: No wheezing.      Comments: Ventilated breath sounds  Abdominal:      General: There is distension.   Musculoskeletal:         General: No swelling.   Skin:     General: Skin is warm and dry.      Capillary Refill: Capillary refill takes less than 2 seconds.         Significant Labs:  CBC:  Recent Labs   Lab 06/25/20  1838 06/26/20  0559 06/26/20  0751   WBC 10.26 11.50 14.17*   RBC 4.21 4.19 4.21   HGB 11.3* 11.2* 11.3*   HCT 36.4* 37.5 36.1*   PLT 97* 75* 81*   MCV 87 90 86   MCH 26.8* 26.7* 26.8*   MCHC 31.0* 29.9* 31.3*     BNP:  Recent Labs   Lab 06/23/20 2059   BNP >4,900*     CMP:  Recent Labs   Lab 06/24/20  0340  06/25/20  0321  06/25/20  2320 06/26/20  0323 06/26/20  0751 06/26/20  1025   *  155*   < > 139*  139*  139*   < > 240* 199*  199* 209* 233*   CALCIUM 10.5  10.5   < > 9.4  9.4  9.4   < > 8.3* 8.3*  8.3* 8.2* 8.1*   ALBUMIN 3.6   < > 3.4*  3.4*   < > 3.0* 3.1* 3.1*  --    PROT 7.5  --  7.3  --   --  6.9  --   --      138   < > 138  138  138   < > 138 138  138 140 139   K 5.2*  5.2*   < > 4.3  4.3  4.3   < > 3.5 4.1  4.1 2.7* 2.6*   CO2 19*  19*   < > 18*  18*  18*   < > 21* 20*  20* 24 24     106   < > 102  102  102   < > 101 100  100 100 100   BUN 64*  64*   < > 86*  86*  86*   < > 104*  105*  105* 109* 110*   CREATININE 2.1*  2.1*   < > 2.6*  2.6*  2.6*   < > 2.8* 3.0*  3.0* 3.0* 3.0*   ALKPHOS 35*  --  34*  --   --  36*  --   --    *  --  120*  --   --  115*  --   --    AST 60*  --  56*  --   --  84*  --   --    BILITOT 3.8*  --  3.6*  --   --  3.1*  --   --     < > = values in this interval not displayed.      Coagulation:   Recent Labs   Lab 06/24/20  0340 06/25/20  0321 06/26/20  0323   INR 1.5* 1.7* 2.1*   APTT 25.2 26.0 29.2     LDH:  No results for input(s): LDH in the last 72 hours.  Microbiology:  Microbiology Results (last 7 days)     Procedure Component Value Units Date/Time    Blood Culture #1 **CANNOT BE ORDERED STAT** [[id number]] Collected: 06/25/20 2303    Order Status: Completed Specimen: Blood from Peripheral, Antecubital, Left Updated: 06/26/20 0545     Blood Culture, Routine No Growth to date    Blood Culture #1 **CANNOT BE ORDERED STAT** [[id number]] Collected: 06/25/20 2302    Order Status: Completed Specimen: Blood from Peripheral, Forearm, Left Updated: 06/26/20 0545     Blood Culture, Routine No Growth to date    Respiratory Infection Panel (PCR), Nasopharyngeal [[id number]] Collected: 06/24/20 2328    Order Status: Completed Specimen: Nasopharyngeal Swab Updated: 06/25/20 1447     Respiratory Infection Panel Source NP Swab     Adenovirus Not Detected     Coronavirus 229E, Common Cold Virus Not Detected     Coronavirus HKU1, Common Cold Virus Not Detected     Coronavirus NL63, Common Cold Virus Not Detected     Coronavirus OC43, Common Cold Virus Not Detected     Comment: The Coronavirus strains detected in this test cause the common cold.  These strains are not the COVID-19 (novel Coronavirus)strain   associated with the respiratory disease outbreak.          Human Metapneumovirus Not Detected     Human Rhinovirus/Enterovirus Not Detected     Influenza A (subtypes H1, H1-2009,H3) Not Detected     Influenza B Not Detected     Parainfluenza Virus 1 Not Detected      Parainfluenza Virus 2 Not Detected     Parainfluenza Virus 3 Not Detected     Parainfluenza Virus 4 Not Detected     Respiratory Syncytial Virus Not Detected     Bordetella Parapertussis (EC8909) Not Detected     Bordetella pertussis (ptxP) Not Detected     Chlamydia pneumoniae Not Detected     Mycoplasma pneumoniae Not Detected     Comment: Respiratory Infection Panel testing performed by Multiplex PCR.       Narrative:      For all other respiratory sources, order [id number] -  Respiratory Viral Panel by PCR    CSF culture [[id number]]     Order Status: No result Specimen: CSF (Spinal Fluid) from CSF Tap, Tube 3     Gram stain [[id number]]     Order Status: No result Specimen: CSF (Spinal Fluid) from CSF Tap, Tube 3           I have reviewed all pertinent labs within the past 24 hours.    Estimated Creatinine Clearance: 33.7 mL/min (A) (based on SCr of 3 mg/dL (H)).    Diagnostic Results:  I have reviewed and interpreted all pertinent imaging results/findings within the past 24 hours.    Assessment and Plan:       * Cardiogenic shock  EF <10% with LVEDD 6.5 cm. Mod fxnl MR. Mod LAE. Mod RV dysfunction. PASP at least 17 mmHg.   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 Other etiologies could be viral myocarditis or CM related to septic shock    Hemodynamics:  6/25: CO 3.6 / CI 2.3 / SVR 1425 / CVP 28 / SVO2 47%  6/26: CO 3 / CI 1.7 / SVR 1550 / CVP 24 / SVO2 43%    - Start nipride with titration goal to MAP 65. Repeat SVO2 after nipride titrated  - May need IABP if nipride is unsuccessful  - Will hold off on HD today unless above measures fail  -  5 mcg/kg/min  - Epinephrine 0.04 mcg/kg/min  - Reyes 20 PPM  - Lasix 40 mg/hr  - Intermittent diuril    Encephalopathy       - Suspect metabolic encephalopathy. Treated for CNS infection. No seizures         on EEG       - LP once INR improved       - Elevated ammonia: will need to make sure she has a BM. Adjusted regimen         Today       - CT head  stable    Fever  Unclear source. Cultures negative. ?CNS infection. Viral panels negative.  - F/u ID recommendations  - Continue broad spectrum abx for now  - LP once INR <1.4    CHRISTIAN (acute kidney injury)  Oliguric CHRISTIAN secondary to CRS  - Nephro following  - Avoid nephrotoxic agents  - Renally dose meds  - May need HD if nipride and IABP fail to improve UOP    Coagulation defect, unspecified  Suspected secondary to liver dysfunction. Elevated factor VIII.  - S/p FFP and vitamin K at outside facility  - FFP (2 units today)  - No evidence of active bleeding    Seizure disorder  - Continue her Keppra at her home dose 750 mg BID. EEG without evidence of seizures  - Neurology consulted    ADHD  - Hold Lisdexamfetamine to avoid counteraction with sedative medications     Acute respiratory failure  Secondary to hydrostatic pulmonary edema  - Cuff leak. CXR with stable ETT position and good tidal volumes    Abe Wilder MD  Heart Transplant  [facility]

## 2020-06-26 NOTE — ASSESSMENT & PLAN NOTE
Unclear source. Cultures negative. ?CNS infection. Viral panels negative.  - F/u ID recommendations  - Continue broad spectrum abx for now  - LP once INR <1.4

## 2020-06-26 NOTE — ASSESSMENT & PLAN NOTE
Non-Oliguric CHRISTIAN on Unknown Baseline Renal Function    At time of Consult: 18 yo female with PMHx of seizure disorder, ADHD, and developmental delays who presents as a transfer from OSH for higher level of care. Originally presented to ED with CC of SOB, fatigue and overall not feeling well.  Found to have a SCr of 1.6, elevated liver enzymes and tachycardia.  She underwent CTA (negative for PE) but found to have cardiomegally, pericardial effusion, pleural effusions and ascites and was later transfer to [facility], where she was found to be in cardiogenic shock.  She was transferred to [facility] for higher level of care and Nephrology was consulted for worsening kidney function.    CHRISTIAN thought to be 2/2 Acute tubular injury from cardiogenic shock  Possible component of NOHELIA from CTA obtained on admission.    Doesn't appear to have RPGN based on available labs.    6/24:  Urine Microscopy with WBC, RBC (non-dysmorphic) hyaline casts, fine/coarse granular casts         Plan/Recommendations:    -Optimize diuretic regiment and monitor response to primary team's interventions today; will offer CRRT if pt continues to have inadequate UOP  -Keep MAP >65   -Maintain Hgb >7.0  -Renally dose meds and avoid nephrotoxic meds  -Strict I/O's  -Will continue to follow closely   -Plan discussed with staff, Dr Reyna

## 2020-06-26 NOTE — NURSING
CMICU DAILY GOALS     No acute events throughout shift, VS and assessment per flow sheet, patient progressing towards goals as tolerated. Patient remained somnolent throughout night, opens eyes to stimulation, and withdraws on lower extremities, occasionally spontaneously moves uppers, R pupil remains larger than left, both round and reactive. Sinus tach up to 130's, BP stable. CVP 19-27, SVO2 43 this am. UO 800cc, Tmax 102. No BM despite lactulose, miralax, and docusate. Abdomen distended and taut, BM faint, Dr. Castro with HTS aware. Plan of care reviewed with Yudy Jauregui and family, all concerns addressed, will continue to monitor    A: Awake    RASS: Goal - RASS Goal: 0-->alert and calm  Actual - RASS (Spencer Agitation-Sedation Scale): -3-->moderate sedation   Restraint necessity:    B: Breath   SBT: Not attempted   C: Coordinate A & B, analgesics/sedatives   Pain: managed    SAT: Not attempted  D: Delirium   CAM-ICU: Overall CAM-ICU: Positive  E: Early Mobility   MOVE Screen: Fail   Activity: Activity Management: bedrest maintained per order  FAS: Feeding/Nutrition   Diet order: Diet/Nutrition Received: tube feeding,   Fluid restriction:    T: Thrombus   DVT prophylaxis: VTE Required Core Measure: (SCDs) Sequential compression device initiated/maintained  H: HOB Elevation   Head of Bed (HOB): HOB at 30-45 degrees  U: Ulcer Prophylaxis   GI: yes  G: Glucose control   managed    S: Skin   Bundle compliance: yes   Bathing/Skin Care: back care, bath, chlorhexidine, bath, complete Date: 6/26AM  B: Bowel Function   constipation   I: Indwelling Catheters   Aguirre necessity:      Urethral Catheter 06/23/20 2300 Straight-tip-Reason for Continuing Urinary Catheterization: Critically ill in ICU requiring intensive monitoring  [REMOVED]      Urethral Catheter 06/23/20 2040 Non-latex-Reason for Continuing Urinary Catheterization: Critically ill in ICU requiring intensive monitoring   CVC necessity: Yes   IPAD offered: Not  appropriate  D: De-escalation Antibx   Yes  Plan for the day   Continue to monitor  Family/Goals of care/Code Status   Code Status: Full Code     .

## 2020-06-26 NOTE — NURSING
Dr. Castro with HTS notified patient with SVO2 38, and then repeat 43. CVP 27, up from 19-20. Hemodynamics remain the same and UO 90cc/hour. No additional orders at this time, will continue to monitor.

## 2020-06-26 NOTE — ASSESSMENT & PLAN NOTE
19 year old female transferred to [facility] for advanced heart failure evaluation. New onset decreased EF to 10%, global hypokinesis. Reported persistent fever of 101-102 at OSH on 6/21. Per history no obvious infectious symptoms prior to hospitalization, particularly no URI symptoms. Patient with leukocytosis on admit to [facility], however had been receiving steroids at OSH. Neurology consulted due to AMS and concerning neurologic findings. CT head was negative.    Called [facility] and [facility] for culture results. Blood cultures on admit at OSH are NGTD for 4 days and U/A was negative at OSH. Repeat cultures drawn after patient became febrile at [facility] have also been no growth and U/A was negative (culture was still sent and NGTD). Patient has been on vancomycin and cefepime. No clear source of infection, perhaps some concern of viral myocarditis given acute drop in EF in a young female with no prior cardiac history.    Now febrile on 6/25, concern for possible VAP as patient has now been intubated for almost 1 week.    Plan:  - Sputum culture pending  - Recommend transitioning ceftriaxone to cefepime given patient's mental status has not improved with current treatment and now with possible VAP  - Recommend removing outside central line, patient may need trialysis line for CRRT soon, anyway.  - Agree with LP per neurology, will follow up results once obtained  - Continuing to cover for bacterial meningitis empirically with vancomycin, recommend switching ceftriaxone to cefepime (understand that this can lower seizure threshold but EEG negative for seizures at this time)  - Respiratory infection panel negative  - Recommend MRI brain once clinically able to, given normal CT head

## 2020-06-26 NOTE — ASSESSMENT & PLAN NOTE
- Continue her Keppra at her home dose 750 mg BID. EEG without evidence of seizures  - Neurology consulted

## 2020-06-26 NOTE — ASSESSMENT & PLAN NOTE
Patient with previous complex partial seizures (controlled over last 2 years on keppra). Patient's ongoing AMS and drowsiness may be secondary to seizures/status. Possible viral infection or other process contributing to her cardiogenic shock and ensuing liver and kidney injuries may have lowered seizure threshold    EEG showing no focal abnormalities or indications of seizures     Recommendations  -Continue home AED

## 2020-06-26 NOTE — SUBJECTIVE & OBJECTIVE
Interval History: No acute events overnight. LP not performed secondary to worsening coagulopathy. Patient's mental status unchanged. Febrile yesterday afternoon with CXR with possible worsening consolidations. Sputum culture pending.    Review of Systems   Unable to perform ROS: Intubated     Objective:     Vital Signs (Most Recent):  Temp: 99.3 °F (37.4 °C) (06/26/20 1000)  Pulse: (!) 125 (06/26/20 1000)  Resp: (!) 24 (06/26/20 1000)  BP: 106/66 (06/26/20 1000)  SpO2: 99 % (06/26/20 1000) Vital Signs (24h Range):  Temp:  [99.3 °F (37.4 °C)-102 °F (38.9 °C)] 99.3 °F (37.4 °C)  Pulse:  [124-139] 125  Resp:  [22-28] 24  SpO2:  [97 %-100 %] 99 %  BP: ()/(55-74) 106/66  Arterial Line BP: ()/(75-87) 95/87     Weight: 72 kg (158 lb 11.7 oz)  Body mass index is 32.06 kg/m².    Estimated Creatinine Clearance: 33.7 mL/min (A) (based on SCr of 3 mg/dL (H)).    Physical Exam  Vitals signs and nursing note reviewed.   Constitutional:       Appearance: She is well-developed.      Comments: Intubated and sedated   HENT:      Head: Normocephalic and atraumatic.      Nose: No congestion or rhinorrhea.      Mouth/Throat:      Comments: OG tube, no drainage in suction cannister  Eyes:      General: Scleral icterus present.      Pupils: Pupils are equal, round, and reactive to light.   Neck:      Musculoskeletal: Normal range of motion and neck supple.      Comments: LIJ central line in place  Cardiovascular:      Rate and Rhythm: Normal rate and regular rhythm.      Heart sounds: Normal heart sounds. No murmur. No friction rub.   Pulmonary:      Effort: Pulmonary effort is normal. No respiratory distress.      Breath sounds: No wheezing or rales.      Comments: ET tube in place, mechanical breath sounds  Abdominal:      General: There is no distension.      Palpations: Abdomen is soft.      Tenderness: There is no abdominal tenderness. There is no guarding or rebound.      Comments: Absent bowel sounds   Musculoskeletal:  Normal range of motion.   Lymphadenopathy:      Cervical: No cervical adenopathy.   Skin:     General: Skin is warm and dry.      Coloration: Skin is not pale.      Findings: No erythema.   Neurological:      Mental Status: She is oriented to person, place, and time.      Cranial Nerves: No cranial nerve deficit.         Significant Labs:   CBC:   Recent Labs   Lab 06/25/20  1838 06/26/20  0559 06/26/20  0751   WBC 10.26 11.50 14.17*   HGB 11.3* 11.2* 11.3*   HCT 36.4* 37.5 36.1*   PLT 97* 75* 81*     CMP:   Recent Labs   Lab 06/25/20  0321  06/25/20  2320 06/26/20  0323 06/26/20  0751 06/26/20  1025     138  138   < > 138 138  138 140 139   K 4.3  4.3  4.3   < > 3.5 4.1  4.1 2.7* 2.6*     102  102   < > 101 100  100 100 100   CO2 18*  18*  18*   < > 21* 20*  20* 24 24   *  139*  139*   < > 240* 199*  199* 209* 233*   BUN 86*  86*  86*   < > 104* 105*  105* 109* 110*   CREATININE 2.6*  2.6*  2.6*   < > 2.8* 3.0*  3.0* 3.0* 3.0*   CALCIUM 9.4  9.4  9.4   < > 8.3* 8.3*  8.3* 8.2* 8.1*   PROT 7.3  --   --  6.9  --   --    ALBUMIN 3.4*  3.4*   < > 3.0* 3.1* 3.1*  --    BILITOT 3.6*  --   --  3.1*  --   --    ALKPHOS 34*  --   --  36*  --   --    AST 56*  --   --  84*  --   --    *  --   --  115*  --   --    ANIONGAP 18*  18*  18*   < > 16 18*  18* 16 15   EGFRNONAA 25.4*  25.4*  25.4*   < > 23.2* 21.4*  21.4* 21.4* 21.4*    < > = values in this interval not displayed.     Microbiology Results (last 7 days)     Procedure Component Value Units Date/Time    Culture, Respiratory with Gram Stain [[id number]] Collected: 06/26/20 1540    Order Status: Sent Specimen: Respiratory from Tracheal Aspirate Updated: 06/26/20 1541    Blood Culture #1 **CANNOT BE ORDERED STAT** [[id number]] Collected: 06/25/20 2303    Order Status: Completed Specimen: Blood from Peripheral, Antecubital, Left Updated: 06/26/20 0545     Blood Culture, Routine No Growth to date    Blood Culture #1  **CANNOT BE ORDERED STAT** [[id number]] Collected: 06/25/20 2302    Order Status: Completed Specimen: Blood from Peripheral, Forearm, Left Updated: 06/26/20 0545     Blood Culture, Routine No Growth to date    Respiratory Infection Panel (PCR), Nasopharyngeal [[id number]] Collected: 06/24/20 2328    Order Status: Completed Specimen: Nasopharyngeal Swab Updated: 06/25/20 1447     Respiratory Infection Panel Source NP Swab     Adenovirus Not Detected     Coronavirus 229E, Common Cold Virus Not Detected     Coronavirus HKU1, Common Cold Virus Not Detected     Coronavirus NL63, Common Cold Virus Not Detected     Coronavirus OC43, Common Cold Virus Not Detected     Comment: The Coronavirus strains detected in this test cause the common cold.  These strains are not the COVID-19 (novel Coronavirus)strain   associated with the respiratory disease outbreak.          Human Metapneumovirus Not Detected     Human Rhinovirus/Enterovirus Not Detected     Influenza A (subtypes H1, H1-2009,H3) Not Detected     Influenza B Not Detected     Parainfluenza Virus 1 Not Detected     Parainfluenza Virus 2 Not Detected     Parainfluenza Virus 3 Not Detected     Parainfluenza Virus 4 Not Detected     Respiratory Syncytial Virus Not Detected     Bordetella Parapertussis (MZ4527) Not Detected     Bordetella pertussis (ptxP) Not Detected     Chlamydia pneumoniae Not Detected     Mycoplasma pneumoniae Not Detected     Comment: Respiratory Infection Panel testing performed by Multiplex PCR.       Narrative:      For all other respiratory sources, order [id number] -  Respiratory Viral Panel by PCR    CSF culture [[id number]]     Order Status: No result Specimen: CSF (Spinal Fluid) from CSF Tap, Tube 3     Gram stain [[id number]]     Order Status: No result Specimen: CSF (Spinal Fluid) from CSF Tap, Tube 3         All pertinent labs within the past 24 hours have been reviewed.    Significant Imaging: I have reviewed all pertinent imaging results/findings  within the past 24 hours.

## 2020-06-26 NOTE — PROGRESS NOTES
[facility]  Nephrology  Progress Note    Patient Name: uYdy Jauregui  MRN: [id number]  Admission Date: 6/23/2020  Hospital Length of Stay: 3 days  Attending Provider: Kaitlin Gamboa MD   Primary Care Physician: Niurka Sinclair MD  Principal Problem:Cardiogenic shock      Interval History: Patient seen and examined at bedside. Pt remains intubated; on 40% FiO2. Pt remained on lasix gtt overnight and CRRT held per primary team's request when pt began producing more urine. UOP decreased again today; spoke with primary team who indicated they would attempt nipride and possibly insert an IABP before pursuing CRRT for volume management       Objective:     Vital Signs (Most Recent):  Temp: 99.7 °F (37.6 °C) (06/26/20 1121)  Pulse: (!) 127 (06/26/20 1121)  Resp: (!) 25 (06/26/20 1121)  BP: 108/61 (06/26/20 1121)  SpO2: 96 % (06/26/20 1121)  O2 Device (Oxygen Therapy): ventilator (06/26/20 1121) Vital Signs (24h Range):  Temp:  [99.3 °F (37.4 °C)-102 °F (38.9 °C)] 99.7 °F (37.6 °C)  Pulse:  [124-139] 127  Resp:  [22-28] 25  SpO2:  [96 %-100 %] 96 %  BP: ()/(55-74) 108/61  Arterial Line BP: ()/(75-87) 95/87     Weight: 72 kg (158 lb 11.7 oz) (06/25/20 0300)  Body mass index is 32.06 kg/m².  Body surface area is 1.73 meters squared.    I/O last 3 completed shifts:  In: 2340.5 [I.V.:855.5; NG/GT:885; IV Piggyback:600]  Out: 2161 [Urine:2161]    Physical Exam  Vitals signs and nursing note reviewed.   Constitutional:       Appearance: She is well-developed. She is ill-appearing. She is not diaphoretic.      Interventions: She is sedated and intubated.   HENT:      Head: Atraumatic.      Mouth/Throat:      Mouth: Mucous membranes are moist.   Eyes:      Extraocular Movements: Extraocular movements intact.      Conjunctiva/sclera: Conjunctivae normal.   Cardiovascular:      Rate and Rhythm: Regular rhythm. Tachycardia present.   Pulmonary:      Effort: She is intubated.      Breath sounds: No wheezing  or rales.   Abdominal:      General: There is distension.      Palpations: Abdomen is soft.   Musculoskeletal:         General: Swelling present. No signs of injury.      Right lower leg: Edema present.      Left lower leg: Edema present.   Skin:     General: Skin is warm and dry.      Coloration: Skin is not jaundiced.      Findings: No rash.         Significant Labs:  ABGs:   Recent Labs   Lab 06/26/20  0311   PH 7.335*   PCO2 43.9   HCO3 23.4*   POCSATURATED 43*   BE -2     CBC:   Recent Labs   Lab 06/26/20  0751   WBC 14.17*   RBC 4.21   HGB 11.3*   HCT 36.1*   PLT 81*   MCV 86   MCH 26.8*   MCHC 31.3*     CMP:   Recent Labs   Lab 06/26/20  0323 06/26/20  0751   *  199* 209*   CALCIUM 8.3*  8.3* 8.2*   ALBUMIN 3.1* 3.1*   PROT 6.9  --      138 140   K 4.1  4.1 2.7*   CO2 20*  20* 24     100 100   *  105* 109*   CREATININE 3.0*  3.0* 3.0*   ALKPHOS 36*  --    *  --    AST 84*  --    BILITOT 3.1*  --            Assessment/Plan:     * Cardiogenic shock  -MGMT per primary team    CHRISTIAN (acute kidney injury)  Non-Oliguric CHRISTIAN on Unknown Baseline Renal Function    At time of Consult: 20 yo female with PMHx of seizure disorder, ADHD, and developmental delays who presents as a transfer from OSH for higher level of care. Originally presented to ED with CC of SOB, fatigue and overall not feeling well.  Found to have a SCr of 1.6, elevated liver enzymes and tachycardia.  She underwent CTA (negative for PE) but found to have cardiomegally, pericardial effusion, pleural effusions and ascites and was later transfer [facility], where she was found to be in cardiogenic shock.  She was transferred to [facility] for higher level of care and Nephrology was consulted for worsening kidney function.    CHRISTIAN thought to be 2/2 Acute tubular injury from cardiogenic shock  Possible component of NOHELIA from CTA obtained on admission.    Doesn't appear to have RPGN based on available  labs.    6/24:  Urine Microscopy with WBC, RBC (non-dysmorphic) hyaline casts, fine/coarse granular casts         Plan/Recommendations:    -Optimize diuretic regiment and monitor response to primary team's interventions today; will offer CRRT if pt continues to have inadequate UOP  -Keep MAP >65   -Maintain Hgb >7.0  -Renally dose meds and avoid nephrotoxic meds  -Strict I/O's  -Will continue to follow closely   -Plan discussed with staff, Dr Reyna         Thank you for your consult. I will follow-up with patient. Please contact us if you have any additional questions.    Homero Jerry, NP  Nephrology  [facility]

## 2020-06-26 NOTE — CARE UPDATE
Care Update     Hemodynamics:    CVP 26, Repeated SvO2 39, CI 1.5, CO 2.6 and SVR 1300    She was started on Nitroprusside 0.3 mcg/kg/min at 1000 this morning.     Other Inotrope/support:   -  5 mcg/kg/min   - Epinephrine 0.04 mcg/kg/min    - Reyes 20 ppm    She is on Lasix 40 mg/hr with frequent intermitted doses of Diuril 500 mg, her UOP has been low  cc/hr, and her CVP still elevated at 26.    Assessment:  - Cardiogenic shock refractory to inotrope and nitroprusside   - Poor renal perfusion and sub-optimal UOP despite full current support.    Plan:  - Proceed with MCS with IABP to provide more cardiac support and afterload reduction    - She is at higher risk of invasive procedure given her elevated INR 2.1 and thrombocytopenia 81.    - She received one units of FFP today.   - Proceed with CRRT for SCUF for slow continue ultrafiltration     Bi Castro MD  Cardiology Fellow (PGY-IV)  Pager: [phone]

## 2020-06-26 NOTE — PROGRESS NOTES
[facility]  Neurology  Progress Note    Patient Name: Yudy Jauregui  MRN: [id number]  Admission Date: 6/23/2020  Hospital Length of Stay: 3 days  Code Status: Full Code   Attending Provider: Kaitlin Gamboa MD  Primary Care Physician: Niurka Sinclair MD   Principal Problem:Cardiogenic shock      Subjective:     Interval History: Awaiting LP per primary team. Patient will receive FFP today per primary. Mental examination is waxing and waning however, patient looks more lethargic today. Plan for dialysis per primary team.     Current Neurological Medications:     Current Facility-Administered Medications   Medication Dose Route Frequency Provider Last Rate Last Dose    0.9%  NaCl infusion (for blood administration)   Intravenous Q24H PRN Abe Wilder MD        acetaminophen oral solution 499.5074 mg  499.5074 mg Per OG tube Q6H PRN Jose Angel Olivarez MD   499.5074 mg at 06/25/20 2310    bisacodyL suppository 10 mg  10 mg Rectal Daily PRN Abe Wilder MD        cefTRIAXone (ROCEPHIN) 2 g/50 mL D5W IVPB  2 g Intravenous Q12H Yvon Castro MD   2 g at 06/26/20 1126    chlorhexidine 0.12 % solution 15 mL  15 mL Mouth/Throat BID Yvon Castro MD   15 mL at 06/26/20 0830    dexmedetomidine (PRECEDEX) 400mcg/100mL 0.9% NaCL infusion  0.2 mcg/kg/hr Intravenous Continuous Yvon Castro MD   Stopped at 06/24/20 0333    dextrose 10% (D10W) Bolus  12.5 g Intravenous PRN Yvon Castro MD        dextrose 50 % in water (D50W) injection 12.5 g  12.5 g Intravenous PRN Yvon Castro MD        DOBUTamine 500mg in D5W 250mL infusion (premix) (NON-TITRATING)  5 mcg/kg/min (Dosing Weight) Intravenous Continuous Lyndsey Solano NP 10.6 mL/hr at 06/26/20 1300 5 mcg/kg/min at 06/26/20 1300    EPINEPHrine (ADRENALIN) 5 mg in sodium chloride 0.9% 250 mL infusion  0.04 mcg/kg/min (Dosing Weight) Intravenous Continuous Abe Wilder MD 8.5 mL/hr at 06/26/20 1300 0.04 mcg/kg/min at 06/26/20 1300     famotidine (PF) injection 20 mg  20 mg Intravenous QHS Kaitlin Gamboa MD   20 mg at 06/25/20 2200    furosemide (LASIX) 500 mg infusion (conc: 10 mg/mL)  40 mg/hr Intravenous Continuous Yvon Castro MD 4 mL/hr at 06/26/20 1327 40 mg/hr at 06/26/20 1327    glucagon (human recombinant) injection 1 mg  1 mg Intramuscular PRN Yvon Castro MD        insulin aspart U-100 pen 0-5 Units  0-5 Units Subcutaneous Q6H PRN Yvon Castro MD   2 Units at 06/26/20 0917    lactulose 20 gram/30 mL solution Soln 20 g  20 g Per OG tube TID Abe Wilder MD        levETIRAcetam (KEPPRA) 750 mg in dextrose 5 % 100 mL IVPB  750 mg Intravenous Q12H Yvon Castro  mL/hr at 06/26/20 0850 750 mg at 06/26/20 0850    lidocaine HCL 10 mg/ml (1%) 10 mg/mL (1 %) injection             nitric oxide gas Gas 20 ppm  20 ppm Inhalation Continuous Abe Wilder MD   20 ppm at 06/25/20 1125    nitroPRUSSide (NIPRIDE) 0.5 mg/mL in dextrose 5 % 100 mL infusion  0.3 mcg/kg/min (Dosing Weight) Intravenous Continuous Abe Wilder MD 4.2 mL/hr at 06/26/20 1300 0.5 mcg/kg/min at 06/26/20 1300    polyethylene glycol packet 17 g  17 g Oral BID Yvon Castro MD   17 g at 06/26/20 0904    senna-docusate 8.6-50 mg per tablet 2 tablet  2 tablet Oral BID Yvon Castro MD   2 tablet at 06/26/20 0829    sodium chloride 0.9% flush 10 mL  10 mL Intravenous PRN Yvon Castro MD        sodium chloride 0.9% flush 10 mL  10 mL Intravenous PRN Yvon Castro MD        vancomycin - pharmacy to dose   Intravenous pharmacy to manage frequency Kaitlin Gamboa MD        white petrolatum-mineral oil (LUBIFRESH P.M.) ophthalmic ointment   Both Eyes QHS Yvon Castro MD           Review of Systems   Constitutional: Positive for fever. Negative for chills and diaphoresis.   Eyes: Positive for discharge.   Musculoskeletal: Negative for gait problem, joint swelling and myalgias.   Psychiatric/Behavioral: Positive for  confusion and decreased concentration. Negative for sleep disturbance and suicidal ideas.     Objective:     Vital Signs (Most Recent):  Temp: 100 °F (37.8 °C) (06/26/20 1535)  Pulse: (!) 129 (06/26/20 1535)  Resp: (!) 24 (06/26/20 1535)  BP: 101/62 (06/26/20 1535)  SpO2: 96 % (06/26/20 1535) Vital Signs (24h Range):  Temp:  [99.3 °F (37.4 °C)-102 °F (38.9 °C)] 100 °F (37.8 °C)  Pulse:  [124-139] 129  Resp:  [22-28] 24  SpO2:  [96 %-100 %] 96 %  BP: ()/(55-74) 101/62  Arterial Line BP: (87-95)/(77-87) 95/87     Weight: 72 kg (158 lb 11.7 oz)  Body mass index is 32.06 kg/m².    Physical Exam  Neurological:      Deep Tendon Reflexes:      Reflex Scores:       Tricep reflexes are 1+ on the right side and 1+ on the left side.       Bicep reflexes are 1+ on the right side and 1+ on the left side.       Brachioradialis reflexes are 1+ on the right side and 1+ on the left side.       Patellar reflexes are 1+ on the right side and 1+ on the left side.       Achilles reflexes are 1+ on the right side and 1+ on the left side.        NEUROLOGICAL EXAMINATION:     MENTAL STATUS        Patient currently intubated   Will track body but does not follow any commands   Extremely lethargic      MOTOR EXAM        Withdraw to pain on lower extremities however, not on the upper extremities      REFLEXES     Reflexes   Right brachioradialis: 1+  Left brachioradialis: 1+  Right biceps: 1+  Left biceps: 1+  Right triceps: 1+  Left triceps: 1+  Right patellar: 1+  Left patellar: 1+  Right achilles: 1+  Left achilles: 1+      Significant Labs:   CBC:   Recent Labs   Lab 06/25/20  1838 06/26/20  0559 06/26/20  0751   WBC 10.26 11.50 14.17*   HGB 11.3* 11.2* 11.3*   HCT 36.4* 37.5 36.1*   PLT 97* 75* 81*     CMP:   Recent Labs   Lab 06/25/20  0321  06/25/20  2320 06/26/20  0323 06/26/20  0751 06/26/20  1025   *  139*  139*   < > 240* 199*  199* 209* 233*     138  138   < > 138 138  138 140 139   K 4.3  4.3  4.3   < >  3.5 4.1  4.1 2.7* 2.6*     102  102   < > 101 100  100 100 100   CO2 18*  18*  18*   < > 21* 20*  20* 24 24   BUN 86*  86*  86*   < > 104* 105*  105* 109* 110*   CREATININE 2.6*  2.6*  2.6*   < > 2.8* 3.0*  3.0* 3.0* 3.0*   CALCIUM 9.4  9.4  9.4   < > 8.3* 8.3*  8.3* 8.2* 8.1*   MG 2.2  --   --  2.2  --   --    PROT 7.3  --   --  6.9  --   --    ALBUMIN 3.4*  3.4*   < > 3.0* 3.1* 3.1*  --    BILITOT 3.6*  --   --  3.1*  --   --    ALKPHOS 34*  --   --  36*  --   --    AST 56*  --   --  84*  --   --    *  --   --  115*  --   --    ANIONGAP 18*  18*  18*   < > 16 18*  18* 16 15   EGFRNONAA 25.4*  25.4*  25.4*   < > 23.2* 21.4*  21.4* 21.4* 21.4*    < > = values in this interval not displayed.       Significant Imaging: I have reviewed all pertinent imaging results/findings within the past 24 hours.    Assessment and Plan:     * Cardiogenic shock  Management per primary     Encephalopathy  Patient with developmental delay, scoliosis, ADHD, and epiliepsy (controlled on keppra) transferred from OSH with cardiogenic shock with an associated liver and kidney injury. Also a previously noted episode of being febrile >101. She was sedated and intubated on arrival from OSH and on dobutamine drip.     Per Echo at OSH, her HF seems to be possibly secondary to viral etiology causing dilation and decreased EF. This has lead to cardiogenic shock with an associated acute hypoxemic respiratory failure, acidemia with hypercarbia, liver injury with transaminitis, and CHRISTIAN. Her LFTs are currently down trending but she has a persistently elevated creatinine    Patient's cardiologic shock is seemingly from a viral etiology. Her AMS may be 2/2 to viral encephalitis or from persistent effects of sedation of fentanyl and precedex, Increased uremia in the setting of CHRISTIAN and liver injury also contributing to the AMS. It is also worth questioning if the patient's ongoing constellation of symptoms could be a  post COVID syndrome.    Recommendations:  -Obtain serial ammonia  -LP per primary team when her INR is stable       CHRISTIAN (acute kidney injury)  See Cardiogenic shock    Coagulation defect, unspecified  See Cardiogenic shock    Seizure disorder  Patient with previous complex partial seizures (controlled over last 2 years on keppra). Patient's ongoing AMS and drowsiness may be secondary to seizures/status. Possible viral infection or other process contributing to her cardiogenic shock and ensuing liver and kidney injuries may have lowered seizure threshold    EEG showing no focal abnormalities or indications of seizures     Recommendations  -Continue home AED       Acute respiratory failure  Patient currently on ventilator and intubated. Most recent BG with improving acidemia and hypercarbia.   Reyes added; plans for SLED with nephro for volume removal    Continued management per primary team          VTE Risk Mitigation (From admission, onward)         Ordered     IP VTE HIGH RISK PATIENT  Once      06/23/20 2146     Place sequential compression device  Until discontinued      06/23/20 2146     Place sequential compression device  Until discontinued      06/23/20 2057                Cinda Montejo MD  Neurology  [facility]

## 2020-06-26 NOTE — PROGRESS NOTES
Pharmacokinetic Assessment Follow Up: IV Vancomycin     Vancomycin serum concentration assessment(s):     The random level was drawn correctly and can be used to guide therapy at this time.   The measurement is above the desired definitive target range of 15 to 20 mcg/mL.  Plan to monitor AM random levels, and redose when <20.   Considering CRRT.      Thank you for the consult,   Jessika Schwab, PharmD, [facility]  Heart Transplant Clinical Specialist   Spectralink: [id number]      Drug levels (last 3 results):  Recent Labs   Lab Result Units 06/24/20  0340 06/25/20  1111 06/26/20  0323   Vancomycin, Random ug/mL 20.4 35.4 27.1       Patient brief summary:  Yudy Jauregui is a 19 y.o. female initiated on antimicrobial therapy with IV Vancomycin for treatment of meningitis    The patient's current regimen is pulse dosing    Drug Allergies:   Review of patient's allergies indicates:  No Known Allergies    Actual Body Weight:   72 kg    Renal Function:   Estimated Creatinine Clearance: 33.7 mL/min (A) (based on SCr of 3 mg/dL (H)).,     Dialysis Method (if applicable):  N/A    CBC (last 72 hours):  Recent Labs   Lab Result Units 06/23/20  2059 06/24/20  0340 06/24/20  1558 06/24/20  1838 06/25/20  0919 06/25/20  1838 06/26/20  0559 06/26/20  0751   WBC K/uL 9.39 9.11 9.59 9.65 9.46 10.26 11.50 14.17*   Hemoglobin g/dL 10.9* 10.8* 10.7* 10.8* 10.8* 11.3* 11.2* 11.3*   Hematocrit % 35.1* 36.7* 34.4* 34.6* 35.5* 36.4* 37.5 36.1*   Platelets K/uL 98* 94* 91* 80* 84* 97* 75* 81*   Gran% % 89.4* 87.7* 87.8* 86.3* 86.7* 85.3* 86.2* 86.5*   Lymph% % 4.3* 4.0* 4.1* 4.5* 5.0* 5.3* 5.6* 5.4*   Mono% % 5.9 7.9 7.7 8.7 7.6 8.4 7.7 7.4   Eosinophil% % 0.0 0.0 0.0 0.0 0.0 0.0 0.0 0.0   Basophil% % 0.0 0.0 0.0 0.0 0.1 0.1 0.0 0.1   Differential Method  Automated Automated Automated Automated Automated Automated Automated Automated       Metabolic Panel (last 72 hours):  Recent Labs   Lab Result Units 06/23/20  2100 06/24/20  0340 06/24/20  0726  06/24/20  1036 06/24/20  1558 06/25/20  0321 06/25/20  0919 06/25/20  1111 06/25/20  1538 06/25/20  2245 06/25/20  2320 06/26/20  0323 06/26/20  0751   Sodium mmol/L 138  138 138  138  --   --  136  136 138  138  138 SEE COMMENT 139 139  --  138 138  138 140   Sodium Urine Random mmol/L  --   --   --  53  --   --   --   --   --   --   --   --   --    Potassium mmol/L 3.5  3.5 5.2*  5.2*  --   --  4.2  4.2 4.3  4.3  4.3 SEE COMMENT 3.8 3.8  --  3.5 4.1  4.1 2.7*   Chloride mmol/L 103  103 106  106  --   --  103  103 102  102  102 SEE COMMENT 98 106  --  101 100  100 100   CO2 mmol/L 21*  21* 19*  19*  --   --  19*  19* 18*  18*  18* SEE COMMENT 21* 17*  --  21* 20*  20* 24   Glucose mg/dL 173*  173* 155*  155*  --   --  147*  147* 139*  139*  139* SEE COMMENT 216* 126*  --  240* 199*  199* 209*   Glucose, UA   --   --  Negative  --   --   --   --   --   --  Negative  --   --   --    BUN, Bld mg/dL 56*  56* 64*  64*  --   --  72*  72* 86*  86*  86* SEE COMMENT 93* 92*  --  104* 105*  105* 109*   Creatinine mg/dL 1.8*  1.8* 2.1*  2.1*  --   --  2.2*  2.2* 2.6*  2.6*  2.6* SEE COMMENT 2.7* 2.5*  --  2.8* 3.0*  3.0* 3.0*   Creatinine, Random Ur mg/dL  --   --   --  27.0  --   --   --   --   --   --   --   --   --    Albumin g/dL 3.9  3.9 3.6  --   --  3.5  3.5 3.4*  3.4* SEE COMMENT 3.0* 3.1*  --  3.0* 3.1* 3.1*   Total Bilirubin mg/dL 4.1*  4.1* 3.8*  --   --   --  3.6*  --   --   --   --   --  3.1*  --    Alkaline Phosphatase U/L 38*  38* 35*  --   --   --  34*  --   --   --   --   --  36*  --    AST U/L 64*  64* 60*  --   --   --  56*  --   --   --   --   --  84*  --    ALT U/L 139*  139* 130*  --   --   --  120*  --   --   --   --   --  115*  --    Magnesium mg/dL 2.1  2.1 2.2  --   --   --  2.2  --   --   --   --   --  2.2  --    Phosphorus mg/dL 4.5  4.5 5.2*  --   --  4.5  4.5 5.1*  5.1* SEE COMMENT 5.4* 4.5  --  4.7* 5.0* 5.2*       Vancomycin  Administrations:  vancomycin given in the last 96 hours                   vancomycin 1.5 g in dextrose 5% 250 mL IVPB (g) 1.5 g New Bag 06/24/20 0648                Microbiologic Results:  Microbiology Results (last 7 days)     Procedure Component Value Units Date/Time    Blood Culture #1 **CANNOT BE ORDERED STAT** [[id number]] Collected: 06/25/20 2303    Order Status: Completed Specimen: Blood from Peripheral, Antecubital, Left Updated: 06/26/20 0545     Blood Culture, Routine No Growth to date    Blood Culture #1 **CANNOT BE ORDERED STAT** [[id number]] Collected: 06/25/20 2302    Order Status: Completed Specimen: Blood from Peripheral, Forearm, Left Updated: 06/26/20 0545     Blood Culture, Routine No Growth to date    Respiratory Infection Panel (PCR), Nasopharyngeal [[id number]] Collected: 06/24/20 2328    Order Status: Completed Specimen: Nasopharyngeal Swab Updated: 06/25/20 1447     Respiratory Infection Panel Source NP Swab     Adenovirus Not Detected     Coronavirus 229E, Common Cold Virus Not Detected     Coronavirus HKU1, Common Cold Virus Not Detected     Coronavirus NL63, Common Cold Virus Not Detected     Coronavirus OC43, Common Cold Virus Not Detected     Comment: The Coronavirus strains detected in this test cause the common cold.  These strains are not the COVID-19 (novel Coronavirus)strain   associated with the respiratory disease outbreak.          Human Metapneumovirus Not Detected     Human Rhinovirus/Enterovirus Not Detected     Influenza A (subtypes H1, H1-2009,H3) Not Detected     Influenza B Not Detected     Parainfluenza Virus 1 Not Detected     Parainfluenza Virus 2 Not Detected     Parainfluenza Virus 3 Not Detected     Parainfluenza Virus 4 Not Detected     Respiratory Syncytial Virus Not Detected     Bordetella Parapertussis (TW6413) Not Detected     Bordetella pertussis (ptxP) Not Detected     Chlamydia pneumoniae Not Detected     Mycoplasma pneumoniae Not Detected     Comment:  Respiratory Infection Panel testing performed by Multiplex PCR.       Narrative:      For all other respiratory sources, order [id number] -  Respiratory Viral Panel by PCR    CSF culture [[id number]]     Order Status: No result Specimen: CSF (Spinal Fluid) from CSF Tap, Tube 3     Gram stain [[id number]]     Order Status: No result Specimen: CSF (Spinal Fluid) from CSF Tap, Tube 3

## 2020-06-26 NOTE — ASSESSMENT & PLAN NOTE
Suspected secondary to liver dysfunction. Elevated factor VIII.  - S/p FFP and vitamin K at outside facility  - FFP (2 units today)  - No evidence of active bleeding

## 2020-06-26 NOTE — SUBJECTIVE & OBJECTIVE
Subjective:     Interval History: Awaiting LP per primary team. Patient will receive FFP today per primary. Mental examination is waxing and waning however, patient looks more lethargic today. Plan for dialysis per primary team.     Current Neurological Medications:     Current Facility-Administered Medications   Medication Dose Route Frequency Provider Last Rate Last Dose    0.9%  NaCl infusion (for blood administration)   Intravenous Q24H PRN Abe Wilder MD        acetaminophen oral solution 499.5074 mg  499.5074 mg Per OG tube Q6H PRN Jose Angel Olivarez MD   499.5074 mg at 06/25/20 2310    bisacodyL suppository 10 mg  10 mg Rectal Daily PRN Abe Wilder MD        cefTRIAXone (ROCEPHIN) 2 g/50 mL D5W IVPB  2 g Intravenous Q12H Yvon Castro MD   2 g at 06/26/20 1126    chlorhexidine 0.12 % solution 15 mL  15 mL Mouth/Throat BID Yvon Castro MD   15 mL at 06/26/20 0830    dexmedetomidine (PRECEDEX) 400mcg/100mL 0.9% NaCL infusion  0.2 mcg/kg/hr Intravenous Continuous Yvon Castro MD   Stopped at 06/24/20 0333    dextrose 10% (D10W) Bolus  12.5 g Intravenous PRN Yvon Castro MD        dextrose 50 % in water (D50W) injection 12.5 g  12.5 g Intravenous PRN Yvon Castro MD        DOBUTamine 500mg in D5W 250mL infusion (premix) (NON-TITRATING)  5 mcg/kg/min (Dosing Weight) Intravenous Continuous Lyndsey Solano NP 10.6 mL/hr at 06/26/20 1300 5 mcg/kg/min at 06/26/20 1300    EPINEPHrine (ADRENALIN) 5 mg in sodium chloride 0.9% 250 mL infusion  0.04 mcg/kg/min (Dosing Weight) Intravenous Continuous Abe Wilder MD 8.5 mL/hr at 06/26/20 1300 0.04 mcg/kg/min at 06/26/20 1300    famotidine (PF) injection 20 mg  20 mg Intravenous QHS Kaitlin Gamboa MD   20 mg at 06/25/20 2200    furosemide (LASIX) 500 mg infusion (conc: 10 mg/mL)  40 mg/hr Intravenous Continuous Yvon Castro MD 4 mL/hr at 06/26/20 1327 40 mg/hr at 06/26/20 1327    glucagon (human recombinant) injection 1  mg  1 mg Intramuscular PRN Yvon Castro MD        insulin aspart U-100 pen 0-5 Units  0-5 Units Subcutaneous Q6H PRN Yvon Castro MD   2 Units at 06/26/20 0917    lactulose 20 gram/30 mL solution Soln 20 g  20 g Per OG tube TID Abe Wilder MD        levETIRAcetam (KEPPRA) 750 mg in dextrose 5 % 100 mL IVPB  750 mg Intravenous Q12H Yvon Castro  mL/hr at 06/26/20 0850 750 mg at 06/26/20 0850    lidocaine HCL 10 mg/ml (1%) 10 mg/mL (1 %) injection             nitric oxide gas Gas 20 ppm  20 ppm Inhalation Continuous Abe Wilder MD   20 ppm at 06/25/20 1125    nitroPRUSSide (NIPRIDE) 0.5 mg/mL in dextrose 5 % 100 mL infusion  0.3 mcg/kg/min (Dosing Weight) Intravenous Continuous Abe Wilder MD 4.2 mL/hr at 06/26/20 1300 0.5 mcg/kg/min at 06/26/20 1300    polyethylene glycol packet 17 g  17 g Oral BID Yvon Castro MD   17 g at 06/26/20 0904    senna-docusate 8.6-50 mg per tablet 2 tablet  2 tablet Oral BID Yvon Castro MD   2 tablet at 06/26/20 0829    sodium chloride 0.9% flush 10 mL  10 mL Intravenous PRN Yvon Castro MD        sodium chloride 0.9% flush 10 mL  10 mL Intravenous PRN Yvon Castro MD        vancomycin - pharmacy to dose   Intravenous pharmacy to manage frequency Kaitlin Gamboa MD        white petrolatum-mineral oil (LUBIFRESH P.M.) ophthalmic ointment   Both Eyes QHS Yvon Castro MD           Review of Systems   Constitutional: Positive for fever. Negative for chills and diaphoresis.   Eyes: Positive for discharge.   Musculoskeletal: Negative for gait problem, joint swelling and myalgias.   Psychiatric/Behavioral: Positive for confusion and decreased concentration. Negative for sleep disturbance and suicidal ideas.     Objective:     Vital Signs (Most Recent):  Temp: 100 °F (37.8 °C) (06/26/20 1535)  Pulse: (!) 129 (06/26/20 1535)  Resp: (!) 24 (06/26/20 1535)  BP: 101/62 (06/26/20 1535)  SpO2: 96 % (06/26/20 1535) Vital Signs (24h  Range):  Temp:  [99.3 °F (37.4 °C)-102 °F (38.9 °C)] 100 °F (37.8 °C)  Pulse:  [124-139] 129  Resp:  [22-28] 24  SpO2:  [96 %-100 %] 96 %  BP: ()/(55-74) 101/62  Arterial Line BP: (87-95)/(77-87) 95/87     Weight: 72 kg (158 lb 11.7 oz)  Body mass index is 32.06 kg/m².    Physical Exam  Neurological:      Deep Tendon Reflexes:      Reflex Scores:       Tricep reflexes are 1+ on the right side and 1+ on the left side.       Bicep reflexes are 1+ on the right side and 1+ on the left side.       Brachioradialis reflexes are 1+ on the right side and 1+ on the left side.       Patellar reflexes are 1+ on the right side and 1+ on the left side.       Achilles reflexes are 1+ on the right side and 1+ on the left side.        NEUROLOGICAL EXAMINATION:     MENTAL STATUS        Patient currently intubated   Will track body but does not follow any commands   Extremely lethargic      MOTOR EXAM        Withdraw to pain on lower extremities however, not on the upper extremities      REFLEXES     Reflexes   Right brachioradialis: 1+  Left brachioradialis: 1+  Right biceps: 1+  Left biceps: 1+  Right triceps: 1+  Left triceps: 1+  Right patellar: 1+  Left patellar: 1+  Right achilles: 1+  Left achilles: 1+      Significant Labs:   CBC:   Recent Labs   Lab 06/25/20  1838 06/26/20  0559 06/26/20  0751   WBC 10.26 11.50 14.17*   HGB 11.3* 11.2* 11.3*   HCT 36.4* 37.5 36.1*   PLT 97* 75* 81*     CMP:   Recent Labs   Lab 06/25/20  0321  06/25/20  2320 06/26/20  0323 06/26/20  0751 06/26/20  1025   *  139*  139*   < > 240* 199*  199* 209* 233*     138  138   < > 138 138  138 140 139   K 4.3  4.3  4.3   < > 3.5 4.1  4.1 2.7* 2.6*     102  102   < > 101 100  100 100 100   CO2 18*  18*  18*   < > 21* 20*  20* 24 24   BUN 86*  86*  86*   < > 104* 105*  105* 109* 110*   CREATININE 2.6*  2.6*  2.6*   < > 2.8* 3.0*  3.0* 3.0* 3.0*   CALCIUM 9.4  9.4  9.4   < > 8.3* 8.3*  8.3* 8.2* 8.1*   MG 2.2   --   --  2.2  --   --    PROT 7.3  --   --  6.9  --   --    ALBUMIN 3.4*  3.4*   < > 3.0* 3.1* 3.1*  --    BILITOT 3.6*  --   --  3.1*  --   --    ALKPHOS 34*  --   --  36*  --   --    AST 56*  --   --  84*  --   --    *  --   --  115*  --   --    ANIONGAP 18*  18*  18*   < > 16 18*  18* 16 15   EGFRNONAA 25.4*  25.4*  25.4*   < > 23.2* 21.4*  21.4* 21.4* 21.4*    < > = values in this interval not displayed.       Significant Imaging: I have reviewed all pertinent imaging results/findings within the past 24 hours.

## 2020-06-26 NOTE — ASSESSMENT & PLAN NOTE
Oliguric CHRISTIAN secondary to CRS  - Nephro following  - Avoid nephrotoxic agents  - Renally dose meds  - May need HD if nipride and IABP fail to improve UOP

## 2020-06-27 NOTE — ASSESSMENT & PLAN NOTE
Oliguric CHRISTIAN secondary to CRS  - Nephro following  - Avoid nephrotoxic agents, Renally dose meds  - Her blood pressure unable to tolerate RRT

## 2020-06-27 NOTE — SUBJECTIVE & OBJECTIVE
Interval History: Worsening renal function in the past 24 hrs, minimal UOP despite high dose diuretics and worsening acidosis. Increased pressor requirements. FiO2 60% from 40% yesterday.     Review of patient's allergies indicates:  No Known Allergies  Current Facility-Administered Medications   Medication Frequency    0.9%  NaCl infusion (for blood administration) Q24H PRN    acetaminophen oral solution 499.5074 mg Q6H PRN    bisacodyL suppository 10 mg Daily PRN    chlorhexidine 0.12 % solution 15 mL BID    dexmedetomidine (PRECEDEX) 400mcg/100mL 0.9% NaCL infusion Continuous    dextrose 10% (D10W) Bolus PRN    dextrose 50 % in water (D50W) injection 12.5 g PRN    DOBUTamine 500mg in D5W 250mL infusion (premix) (NON-TITRATING) Continuous    EPINEPHrine (ADRENALIN) 5 mg in sodium chloride 0.9% 250 mL infusion Continuous    famotidine (PF) injection 20 mg QHS    furosemide (LASIX) 500 mg infusion (conc: 10 mg/mL) Continuous    glucagon (human recombinant) injection 1 mg PRN    heparin (porcine) injection 5,000 Units Q8H    insulin aspart U-100 pen 0-5 Units Q6H PRN    lactulose 20 gram/30 mL solution Soln 20 g TID    levETIRAcetam (KEPPRA) 750 mg in dextrose 5 % 100 mL IVPB Q12H    meropenem (MERREM) 2 g in sodium chloride 0.9% 100 mL IVPB Q12H    nitric oxide gas Gas 20 ppm Continuous    nitroPRUSSide (NIPRIDE) 0.5 mg/mL in dextrose 5 % 100 mL infusion Continuous    norepinephrine 1 mg/mL injection     norepinephrine 16 mg in dextrose 5 % 250 mL infusion Continuous    polyethylene glycol packet 17 g BID    senna-docusate 8.6-50 mg per tablet 2 tablet BID    sodium chloride 0.9% flush 10 mL PRN    sodium chloride 0.9% flush 10 mL PRN    vancomycin - pharmacy to dose pharmacy to manage frequency    vasopressin (PITRESSIN) 0.2 Units/mL in dextrose 5 % 100 mL infusion Continuous    vasopressin (PITRESSIN) 20 unit/mL injection     white petrolatum-mineral oil (LUBIFRESH P.M.) ophthalmic  ointment QHS       Objective:     Vital Signs (Most Recent):  Temp: (!) 103.6 °F (39.8 °C) (06/27/20 0900)  Pulse: (!) 127 (06/27/20 0900)  Resp: (!) 31 (06/27/20 0900)  BP: 95/61 (06/27/20 0900)  SpO2: 97 % (06/27/20 0900)  O2 Device (Oxygen Therapy): ventilator (06/27/20 0900) Vital Signs (24h Range):  Temp:  [99.5 °F (37.5 °C)-103.8 °F (39.9 °C)] 103.6 °F (39.8 °C)  Pulse:  [125-266] 127  Resp:  [24-38] 31  SpO2:  [85 %-100 %] 97 %  BP: ()/(50-71) 95/61  Arterial Line BP: ()/(37-59) 101/45     Weight: 72 kg (158 lb 11.7 oz) (06/25/20 0300)  Body mass index is 32.06 kg/m².  Body surface area is 1.73 meters squared.    I/O last 3 completed shifts:  In: 1746.9 [I.V.:636.9; NG/GT:360; IV Piggyback:750]  Out: 2666 [Urine:2666]    Physical Exam  Vitals signs and nursing note reviewed.   Constitutional:       Appearance: She is well-developed. She is ill-appearing. She is not diaphoretic.      Interventions: She is sedated and intubated.   HENT:      Head: Atraumatic.      Mouth/Throat:      Mouth: Mucous membranes are moist.   Eyes:      Extraocular Movements: Extraocular movements intact.      Conjunctiva/sclera: Conjunctivae normal.   Cardiovascular:      Rate and Rhythm: Regular rhythm. Tachycardia present.   Pulmonary:      Effort: She is intubated.      Breath sounds: No wheezing or rales.   Abdominal:      General: There is distension.      Palpations: Abdomen is soft.   Musculoskeletal:         General: Swelling present. No signs of injury.      Right lower leg: Edema present.      Left lower leg: Edema present.   Skin:     General: Skin is warm and dry.      Coloration: Skin is not jaundiced.      Findings: No rash.         Significant Labs:  ABGs:   Recent Labs   Lab 06/27/20  0801   PH 7.280*   PCO2 25.2*   HCO3 11.8*   POCSATURATED 91*   BE -15     CBC:   Recent Labs   Lab 06/27/20  0800   WBC 13.49*   RBC 3.67*   HGB 9.9*   HCT 32.5*   PLT 51*   MCV 89   MCH 27.0   MCHC 30.5*     CMP:   Recent  Labs   Lab 06/27/20  0300 06/27/20  0800   *  149* 146*   CALCIUM 8.0*  8.0* 7.5*   ALBUMIN 3.1* 2.7*   PROT 6.6  --      141 141   K 4.3  4.3 5.3*   CO2 22*  22* 12*     102 104   *  119* 107*   CREATININE 3.6*  3.6* 3.9*   ALKPHOS 37*  --    ALT 96*  --    AST 67*  --    BILITOT 2.5*  --      All labs within the past 24 hours have been reviewed.

## 2020-06-27 NOTE — ASSESSMENT & PLAN NOTE
Non-Oliguric CHRISTIAN on Unknown Baseline Renal Function    At time of Consult: 18 yo female with PMHx of seizure disorder, ADHD, and developmental delays who presents as a transfer from OSH for higher level of care. Originally presented to ED with CC of SOB, fatigue and overall not feeling well.  Found to have a SCr of 1.6, elevated liver enzymes and tachycardia.  She underwent CTA (negative for PE) but found to have cardiomegally, pericardial effusion, pleural effusions and ascites and was later transfer to [facility], where she was found to be in cardiogenic shock.  She was transferred to [facility] for higher level of care and Nephrology was consulted for worsening kidney function.    CHRISTIAN thought to be 2/2 Acute tubular injury from cardiogenic shock  Possible component of NOHELIA from CTA obtained on admission.    Doesn't appear to have RPGN based on available labs.    6/24:  Urine Microscopy with WBC, RBC (non-dysmorphic) hyaline casts, fine/coarse granular casts         Plan/Recommendations:  - Will plan to start SLED continuously mainly for metabolic clearance due to worsening acidosis, renal function and minimal UOP. UF to run net even at the beginning, then adjusted depending on hemodynamics. Family members oriented about treatment plan, agreed and expressed understanding.   -Keep MAP >65   -Maintain Hgb >7.0  -Renally dose meds and avoid nephrotoxic meds  -Strict I/O's  -Will continue to follow closely

## 2020-06-27 NOTE — HPI
Ms. Yudy Jauregui is a 19 y.o.female with prior history of focal epilepsy with complex partial seizures, developmental delay and ADHD. She presented to [facility] on 6/20/2020 with symptoms of shortness of breath, fatigue, and feeling unwell. Further work up showed that she was in cardiogenic shock. Further work up showed transaminitis, acute kidney injury and acute hypoxic respiratory failure requiring mechanical ventilation. During her stay at [facility] and [facility], she spiked fever 100.8 and continued on inotrope support (see below). She was on Lasix and Albumin and Diuril which was held secondary to elevated urine output for the last lasts. He ventilation setting improved and her FiO2 decrease to medium setting oxygenations. She was on solumedrol and received IVIG 100g as well. On arrival she was on multiple infusions including Milrinone Infusion 0.5 mcg/kg/min, Heparin 1 unit/mL, Epi 0.03 mcg/kg/min, and sedated with Fentanyl 100 mcg/hr and dexmedetomidine 0.5 mcg/kg/hr. During her stay in [facility], she was in profound shock and changed her inotrope from Milrinone to  given her worsening renal function and initially anuric state. She showed no signs of improvement in her hemodynamics despite maximally support with inotrope. She was started on Nitroprusside and her blood pressure unable to tolerate it. Interventional cardiology consulted for MCS.

## 2020-06-27 NOTE — HOSPITAL COURSE
Admitted from [facility] with milrinone and lasix. Her urine output has been poor and we continued to maximally increase the dose of lasix and provide multiple doses of diuril with no signs of improvement in her urine output. Milrinone changed to dobutamine given no urine output. Adding Epi at 0.04 mcg/kg/min and Reyes did not provide great impact on her hemodynamics. On 6/26/2020, she underwent IABP for worsening hemodynamics. CVP 26, Repeated SvO2 39, CI 1.5, CO 2.6 and SVR 1300. For the first two hours after IABP placement, she showed signs of improvement in her hemodynamics. However, in the next couple of hours, her hemodynamics was worsening and we have to stop Nitroprusside and adding two more pressors to support her MAP.On 06/27/2020 at 1230 she was maximally on different inotrope's including Epi, Levophed, Dobutamine, ADH analogue, vasopressin, and stress dose steroids. Her HD was CVP 28, SvO2 36, CI 1.5, CO 2.7 and  on IABP 1:1. Please refer to significant events for her death note.

## 2020-06-27 NOTE — DISCHARGE SUMMARY
[facility]  Heart Transplant  Discharge Summary      Patient Name: Yudy Jauregui  MRN: [id number]  Admission Date: 2020  Hospital Length of Stay: 4 days  Discharge Date and Time: 2020 12:51 PM  Attending Physician: Kaitlin Gamboa MD   Discharging Provider: Yvon Castro MD  Primary Care Provider: Niurka Sinclair MD     HPI: Ms. Yudy Jauregui is a 19 y.o.female with prior history of focal epilepsy with complex partial seizures, developmental delay and ADHD. She presented to [facility] on 2020 with symptoms of shortness of breath, fatigue, and feeling unwell. Further work up showed that she was in cardiogenic shock. Further work up showed transaminitis, acute kidney injury and acute hypoxic respiratory failure requiring mechanical ventilation. During her stay at [facility] and [facility], she spiked fever 100.8 and continued on inotrope support (see below). She was on Lasix and Albumin and Diuril which was held secondary to elevated urine output for the last lasts. He ventilation setting improved and her FiO2 decrease to medium setting oxygenations. She was on solumedrol and received IVIG 100g as well. Echo noted to have mod mitral valve insufficiency, mild tricuspid valve insufficiency, moderate LV dilation, decreased RV and LV systolic function, small pericardial effusion and bilateral pleural effusion; EF ~20%. Repeat echo with similar results. On arrival she was on multiple infusions including Milrinone Infusion 0.5 mcg/kg/min, Heparin 1 unit/mL, Epi 0.03 mcg/kg/min, and sedated with Fentanyl 100 mcg/hr and dexmedetomidine 0.5 mcg/kg/hr.     Ped History:  Born full term and born with    She developed RSV as two weeks old  Developmental delay and interllectula diability and ADHD and she was on different medications     Family History:  No first degree relative with heart disease   She has a a sibling sister who passed at age of  12 months with developmental dealy related to the brain (not to heart).     Procedure(s) (LRB):  INSERTION, INTRA-AORTIC BALLOON PUMP (Bilateral)     Hospital Course: Admitted from [facility] with milrinone and lasix. Her urine output has been poor and we continued to maximally increase the dose of lasix and provide multiple doses of diuril with no signs of improvement in her urine output. Milrinone changed to dobutamine given no urine output. Adding Epi at 0.04 mcg/kg/min and Reyes did not provide great impact on her hemodynamics. On 6/26/2020, she underwent IABP for worsening hemodynamics. CVP 26, Repeated SvO2 39, CI 1.5, CO 2.6 and SVR 1300. For the first two hours after IABP placement, she showed signs of improvement in her hemodynamics. However, in the next couple of hours, her hemodynamics was worsening and we have to stop Nitroprusside and adding two more pressors to support her MAP.On 06/27/2020 at 1230 she was maximally on different inotrope's including Epi, Levophed, Dobutamine, ADH analogue, vasopressin, and stress dose steroids. Her HD was CVP 28, SvO2 36, CI 1.5, CO 2.7 and  on IABP 1:1. Please refer to significant events for her death note.     Consults (From admission, onward)        Status Ordering Provider     Inpatient consult to Infectious Diseases  Once     Provider:  (Not yet assigned)    Completed MALIA WALLACE     Inpatient consult to Nephrology  Once     Provider:  (Not yet assigned)    Completed MARCELO ALBERTO     Inpatient consult to Neurology  Once     Provider:  (Not yet assigned)    Completed MALIA WALLACE     Inpatient consult to Registered Dietitian/Nutritionist  Once     Provider:  (Not yet assigned)    Completed MARCELO ALBERTO          Significant Diagnostic Studies:   Labs:   BMP:   Recent Labs   Lab 06/26/20  0323  06/26/20  2313 06/27/20  0300 06/27/20  0800   *  199*   < > 150* 149*  149* 146*     138   < > 139 141   141 141   K 4.1  4.1   < > 3.8 4.3  4.3 5.3*     100   < > 102 102  102 104   CO2 20*  20*   < > 20* 22*  22* 12*   *  105*   < > 116* 119*  119* 107*   CREATININE 3.0*  3.0*   < > 3.4* 3.6*  3.6* 3.9*   CALCIUM 8.3*  8.3*   < > 8.0* 8.0*  8.0* 7.5*   MG 2.2  --   --  2.2  --     < > = values in this interval not displayed.   , CMP   Recent Labs   Lab 06/26/20  0323  06/26/20  1820 06/26/20  2313 06/27/20  0300 06/27/20  0800     138   < > 142 139 141  141 141   K 4.1  4.1   < > 4.2 3.8 4.3  4.3 5.3*     100   < > 103 102 102  102 104   CO2 20*  20*   < > 23 20* 22*  22* 12*   *  199*   < > 144* 150* 149*  149* 146*   *  105*   < > 112* 116* 119*  119* 107*   CREATININE 3.0*  3.0*   < > 3.3* 3.4* 3.6*  3.6* 3.9*   CALCIUM 8.3*  8.3*   < > 7.9* 8.0* 8.0*  8.0* 7.5*   PROT 6.9  --  6.5  --  6.6  --    ALBUMIN 3.1*   < > 3.0* 3.1* 3.1* 2.7*   BILITOT 3.1*  --  2.6*  --  2.5*  --    ALKPHOS 36*  --  40*  --  37*  --    AST 84*  --  73*  --  67*  --    *  --  108*  --  96*  --    ANIONGAP 18*  18*   < > 16 17* 17*  17* 25*   ESTGFRAFRICA 24.7*  24.7*   < > 22.0* 21.2* 19.8*  19.8* 18.0*   EGFRNONAA 21.4*  21.4*   < > 19.1* 18.4* 17.2*  17.2* 15.6*    < > = values in this interval not displayed.   , CBC   Recent Labs   Lab 06/26/20  1812 06/27/20  0540 06/27/20  0800   WBC 17.86* 15.32* 13.49*   HGB 10.6* 10.0* 9.9*   HCT 34.8* 31.1* 32.5*   PLT 84* 56* 51*   , INR   Lab Results   Component Value Date    INR 2.0 (H) 06/27/2020    INR 2.1 (H) 06/26/2020    INR 1.7 (H) 06/25/2020     Recent Labs   Lab 06/23/20  0420   HGBA1C 6.30     Specimen (12h ago, onward)    None          Pending Diagnostic Studies:     Procedure Component Value Units Date/Time    FL Lumbar Puncture (xpd) [[id number]]     Order Status: Sent Lab Status: No result         Final Active Diagnoses:    Diagnosis Date Noted POA    PRINCIPAL PROBLEM:  Cardiogenic shock [R57.0]  2020 Yes    Encephalopathy [G93.40] 2020 Yes    CHRISTIAN (acute kidney injury) [N17.9] 2020 Yes    Fever [R50.9] 2020 Yes    Acute respiratory failure [J96.00] 2020 Yes    Coagulation defect, unspecified [D68.9] 2020 Yes    ADHD [F90.9] 2020 Yes    Seizure disorder [G40.909] 2020 Yes      Problems Resolved During this Admission:    Diagnosis Date Noted Date Resolved POA    Leukocytosis [D72.829] 2020 Yes    Pericardial effusion [I31.3] 2020 Yes      Discharged Condition:     Medications:  None (patient  at medical facility)    Yvon Castro MD  Heart Transplant  [facility]

## 2020-06-27 NOTE — CONSULTS
[facility]  Interventional Cardiology  Consult Note    Patient Name: Yudy Jauregui  MRN: [id number]  Admission Date: 6/23/2020  Hospital Length of Stay: 3 days  Code Status: Full Code   Attending Provider: Kaitlin Gamboa MD  Consulting Provider: Yvon Castro MD  Primary Care Physician: Niurka Sinclair MD  Principal Problem:Cardiogenic shock    Patient information was obtained from patient and ER records.     Consults  Subjective:     Chief Complaint:  Cardiogenic shock      HPI:  Ms. Yudy Jauregui is a 19 y.o.female with prior history of focal epilepsy with complex partial seizures, developmental delay and ADHD. She presented to [facility] on 6/20/2020 with symptoms of shortness of breath, fatigue, and feeling unwell. Further work up showed that she was in cardiogenic shock. Further work up showed transaminitis, acute kidney injury and acute hypoxic respiratory failure requiring mechanical ventilation. During her stay at [facility], she spiked fever 100.8 and continued on inotrope support (see below). She was on Lasix and Albumin and Diuril which was held secondary to elevated urine output for the last lasts. He ventilation setting improved and her FiO2 decrease to medium setting oxygenations. She was on solumedrol and received IVIG 100g as well. On arrival she was on multiple infusions including Milrinone Infusion 0.5 mcg/kg/min, Heparin 1 unit/mL, Epi 0.03 mcg/kg/min, and sedated with Fentanyl 100 mcg/hr and dexmedetomidine 0.5 mcg/kg/hr. During her stay in [facility], she was in profound shock and changed her inotrope from Milrinone to  given her worsening renal function and initially anuric state. She showed no signs of improvement in her hemodynamics despite maximally support with inotrope. She was started on Nitroprusside and her blood pressure unable to tolerate it. Interventional cardiology consulted for MCS.     Past Medical  History:   Diagnosis Date    Intellectual disability     Seizures     Strabismus        Past Surgical History:   Procedure Laterality Date    STRABISMUS SURGERY  9/99    BMR recession OU .5MM    STRABISMUS SURGERY  7/2001    BSO tenotomy/LLR recession 4mm    STRABISMUS SURGERY  5/2005    RLR recession 6mm/ IO myectomy OU        Review of patient's allergies indicates:  No Known Allergies    PTA Medications   Medication Sig    amitriptyline (ELAVIL) 75 MG tablet TK 1 T PO QHS    clonidine (CATAPRES) 0.1 MG tablet Take 0.1 mg by mouth 2 (two) times daily.    diazePAM 5-7.5-10 mg (DIASTAT ACUDIAL) 5-7.5-10 mg Kit Place 1 each (10 mg total) rectally as needed (seizure > 5 mins).    INTROVALE 0.15 mg-30 mcg (91) per tablet TAKE 1 TABLET BY MOUTH EVERY DAY    levETIRAcetam (KEPPRA) 750 MG Tab Take 1 tablet (750 mg total) by mouth 2 (two) times daily.    levonorgestrel-ethinyl estradiol (INTROVALE) 0.15 mg-30 mcg (91) per tablet Take 1 tablet by mouth once daily.    VYVANSE 50 mg capsule Take 50 mg by mouth once daily.     Family History     Problem Relation (Age of Onset)    Amblyopia Paternal Aunt    Cancer Maternal Grandfather    Cataracts Maternal Grandmother    Hypertension Father, Maternal Grandmother, Paternal Grandmother    Ovarian cancer Maternal Aunt    Strabismus Father, Paternal Aunt    Thyroid disease Paternal Grandfather        Tobacco Use    Smoking status: Never Smoker    Smokeless tobacco: Never Used   Substance and Sexual Activity    Alcohol use: No    Drug use: No    Sexual activity: Never     Birth control/protection: OCP     Comment: Seasonale     Review of Systems   Unable to perform ROS: intubated     Objective:     Vital Signs (Most Recent):  Temp: (!) 101.1 °F (38.4 °C) (06/26/20 1800)  Pulse: (!) 134 (06/26/20 1800)  Resp: (!) 26 (06/26/20 1800)  BP: (!) 81/51 (06/26/20 1800)  SpO2: 97 % (06/26/20 1800) Vital Signs (24h Range):  Temp:  [99.3 °F (37.4 °C)-101.3 °F (38.5 °C)] 101.1  °F (38.4 °C)  Pulse:  [124-139] 134  Resp:  [22-30] 26  SpO2:  [95 %-100 %] 97 %  BP: ()/(51-74) 81/51  Arterial Line BP: (88-90)/(50-51) 90/50     Weight: 72 kg (158 lb 11.7 oz)  Body mass index is 32.06 kg/m².    SpO2: 97 %  O2 Device (Oxygen Therapy): ventilator      Intake/Output Summary (Last 24 hours) at 6/26/2020 1917  Last data filed at 6/26/2020 1823  Gross per 24 hour   Intake 1646.92 ml   Output 2341 ml   Net -694.08 ml       Lines/Drains/Airways     Central Venous Catheter Line            Percutaneous Central Line Insertion/Assessment - Triple Lumen  06/23/20 2040 left internal jugular 2 days    Percutaneous Central Line Insertion/Assessment - Triple Lumen  06/26/20 1750 right internal jugular less than 1 day          Drain                 NG/OG Tube 06/24/20 1250 orogastric Center mouth 2 days         Urethral Catheter 06/23/20 2300 Straight-tip 2 days          Airway                 Airway - Non-Surgical 06/23/20 2040 Endotracheal Tube 2 days          Arterial Line                 Arterial Line 06/26/20 1632 Right Brachial less than 1 day                Physical Exam   Neck: JVD (23) present. No thyromegaly present.   Cardiovascular:   Murmur heard.  Pulses:       Radial pulses are 1+ on the right side and 1+ on the left side.        Dorsalis pedis pulses are 1+ on the right side and 1+ on the left side.        Posterior tibial pulses are 1+ on the right side and 1+ on the left side.   Tachycardia    Pulmonary/Chest: She has wheezes. She has rales.   Vent Mode: A/C  Oxygen Concentration (%):  (40) 40  Resp Rate Total:  (21 br/min-31 br/min) 27 br/min  Vt Set:  (300 mL) 300 mL  PEEP/CPAP:  (8 cmH20) 8 cmH20  Mean Airway Pressure:  (13 cmH20-15 cmH20) 13 cmH20   Abdominal: She exhibits distension. There is no abdominal tenderness.   Neurological:   Sedated    Skin: Skin is dry. No erythema.        bilateral CVC        Significant Labs:   CMP   Recent Labs   Lab 06/25/20  0321  06/25/20  2320  06/26/20  0323 06/26/20  0751 06/26/20  1025     138  138   < > 138 138  138 140 139   K 4.3  4.3  4.3   < > 3.5 4.1  4.1 2.7* 2.6*     102  102   < > 101 100  100 100 100   CO2 18*  18*  18*   < > 21* 20*  20* 24 24   *  139*  139*   < > 240* 199*  199* 209* 233*   BUN 86*  86*  86*   < > 104* 105*  105* 109* 110*   CREATININE 2.6*  2.6*  2.6*   < > 2.8* 3.0*  3.0* 3.0* 3.0*   CALCIUM 9.4  9.4  9.4   < > 8.3* 8.3*  8.3* 8.2* 8.1*   PROT 7.3  --   --  6.9  --   --    ALBUMIN 3.4*  3.4*   < > 3.0* 3.1* 3.1*  --    BILITOT 3.6*  --   --  3.1*  --   --    ALKPHOS 34*  --   --  36*  --   --    AST 56*  --   --  84*  --   --    *  --   --  115*  --   --    ANIONGAP 18*  18*  18*   < > 16 18*  18* 16 15   ESTGFRAFRICA 29.3*  29.3*  29.3*   < > 26.8* 24.7*  24.7* 24.7* 24.7*   EGFRNONAA 25.4*  25.4*  25.4*   < > 23.2* 21.4*  21.4* 21.4* 21.4*    < > = values in this interval not displayed.   , CBC   Recent Labs   Lab 06/26/20  0559 06/26/20  0751 06/26/20  1812   WBC 11.50 14.17* 17.86*   HGB 11.2* 11.3* 10.6*   HCT 37.5 36.1* 34.8*   PLT 75* 81* 84*    and All pertinent lab results from the last 24 hours have been reviewed.    Assessment and Plan:     * Cardiogenic shock  CVP 26, Repeated SvO2 39, CI 1.5, CO 2.6 and SVR 1300     She was started on Nitroprusside 0.3 mcg/kg/min at 1000 this morning.      Other Inotrope/support:              -  5 mcg/kg/min              - Epinephrine 0.04 mcg/kg/min               - Reyes 20 ppm     She is on Lasix 40 mg/hr with frequent intermitted doses of Diuril 500 mg, her UOP has been low  cc/hr, and her CVP still elevated at 26.     Assessment:  - Cardiogenic shock refractory to inotrope and nitroprusside   - Poor renal perfusion and sub-optimal UOP despite full current support.     Plan  - Proceed with MCS with IABP to provide more cardiac support and afterload reduction               - She is at higher risk of invasive  procedure given her elevated INR 2.1 and thrombocytopenia 81.                          - She received one units of FFP today.   Sedation   Type of sedation: RN IV sedation    Mallampati score: III   ASA score: III    Informed Consent  -The risks, benefits & alternatives of the procedure were explained to the patient's family.    -The risks of moderate sedation include hypotension, respiratory depression, arrhythmias, bronchospasm, & death.    -Informed consent was obtained & the patient is agreeable to proceed with the procedure.  -This patient was discussed with interventional cardiology staff      VTE Risk Mitigation (From admission, onward)         Ordered     IP VTE HIGH RISK PATIENT  Once      06/23/20 2146     Place sequential compression device  Until discontinued      06/23/20 2146     Place sequential compression device  Until discontinued      06/23/20 2057                Thank you for your consult. I will follow-up with patient. Please contact us if you have any additional questions.    Yvon Castro MD  Interventional Cardiology   [facility]

## 2020-06-27 NOTE — SIGNIFICANT EVENT
Death Note    I was called by the nurse of Yudy Jauregui after noticing asystole on moitor.After examining the patient, auscultation of pericardium with absent heart sound. No signs of chest raising, and no breath sounds on lung auscultation. Peripheral pulses are absent. Pupils not reactive to light reflex.     Time of death: 1238 06/27/2020   Patient's preliminary cause of death lactic acidosis due to multi-organ failure.     Bi Castro MD  Cardiology Fellow (PGY-[id number])  Pager: [phone]   Email: [email or web address]

## 2020-06-27 NOTE — PROGRESS NOTES
1215 Desaturation noted to 87% on monitor with a poor waveform, attempted to obtain pulse ox on multiple sources of the body without success. Left hand now picking up at 60% at 1224, patient placed on 100% on the vent and RT called to bedside for an ABG. MD Gloria coffeyalled to bedside and made aware of patient changes. At this time, patients desaturations are accompanied by a noted decrease in blood pressure, patient maxed on all gtts  and dialysis rinsed back without improvement. Patient then became bradycardic and asystole at 1238. Family at bedside,  notified.

## 2020-06-27 NOTE — SUBJECTIVE & OBJECTIVE
Past Medical History:   Diagnosis Date    Intellectual disability     Seizures     Strabismus        Past Surgical History:   Procedure Laterality Date    STRABISMUS SURGERY  9/99    BMR recession OU .5MM    STRABISMUS SURGERY  7/2001    BSO tenotomy/LLR recession 4mm    STRABISMUS SURGERY  5/2005    RLR recession 6mm/ IO myectomy OU        Review of patient's allergies indicates:  No Known Allergies    PTA Medications   Medication Sig    amitriptyline (ELAVIL) 75 MG tablet TK 1 T PO QHS    clonidine (CATAPRES) 0.1 MG tablet Take 0.1 mg by mouth 2 (two) times daily.    diazePAM 5-7.5-10 mg (DIASTAT ACUDIAL) 5-7.5-10 mg Kit Place 1 each (10 mg total) rectally as needed (seizure > 5 mins).    INTROVALE 0.15 mg-30 mcg (91) per tablet TAKE 1 TABLET BY MOUTH EVERY DAY    levETIRAcetam (KEPPRA) 750 MG Tab Take 1 tablet (750 mg total) by mouth 2 (two) times daily.    levonorgestrel-ethinyl estradiol (INTROVALE) 0.15 mg-30 mcg (91) per tablet Take 1 tablet by mouth once daily.    VYVANSE 50 mg capsule Take 50 mg by mouth once daily.     Family History     Problem Relation (Age of Onset)    Amblyopia Paternal Aunt    Cancer Maternal Grandfather    Cataracts Maternal Grandmother    Hypertension Father, Maternal Grandmother, Paternal Grandmother    Ovarian cancer Maternal Aunt    Strabismus Father, Paternal Aunt    Thyroid disease Paternal Grandfather        Tobacco Use    Smoking status: Never Smoker    Smokeless tobacco: Never Used   Substance and Sexual Activity    Alcohol use: No    Drug use: No    Sexual activity: Never     Birth control/protection: OCP     Comment: Seasonale     Review of Systems   Unable to perform ROS: intubated     Objective:     Vital Signs (Most Recent):  Temp: (!) 101.1 °F (38.4 °C) (06/26/20 1800)  Pulse: (!) 134 (06/26/20 1800)  Resp: (!) 26 (06/26/20 1800)  BP: (!) 81/51 (06/26/20 1800)  SpO2: 97 % (06/26/20 1800) Vital Signs (24h Range):  Temp:  [99.3 °F (37.4 °C)-101.3 °F  (38.5 °C)] 101.1 °F (38.4 °C)  Pulse:  [124-139] 134  Resp:  [22-30] 26  SpO2:  [95 %-100 %] 97 %  BP: ()/(51-74) 81/51  Arterial Line BP: (88-90)/(50-51) 90/50     Weight: 72 kg (158 lb 11.7 oz)  Body mass index is 32.06 kg/m².    SpO2: 97 %  O2 Device (Oxygen Therapy): ventilator      Intake/Output Summary (Last 24 hours) at 6/26/2020 1917  Last data filed at 6/26/2020 1823  Gross per 24 hour   Intake 1646.92 ml   Output 2341 ml   Net -694.08 ml       Lines/Drains/Airways     Central Venous Catheter Line            Percutaneous Central Line Insertion/Assessment - Triple Lumen  06/23/20 2040 left internal jugular 2 days    Percutaneous Central Line Insertion/Assessment - Triple Lumen  06/26/20 1750 right internal jugular less than 1 day          Drain                 NG/OG Tube 06/24/20 1250 orogastric Center mouth 2 days         Urethral Catheter 06/23/20 2300 Straight-tip 2 days          Airway                 Airway - Non-Surgical 06/23/20 2040 Endotracheal Tube 2 days          Arterial Line                 Arterial Line 06/26/20 1632 Right Brachial less than 1 day                Physical Exam   Neck: JVD (23) present. No thyromegaly present.   Cardiovascular:   Murmur heard.  Pulses:       Radial pulses are 1+ on the right side and 1+ on the left side.        Dorsalis pedis pulses are 1+ on the right side and 1+ on the left side.        Posterior tibial pulses are 1+ on the right side and 1+ on the left side.   Tachycardia    Pulmonary/Chest: She has wheezes. She has rales.   Vent Mode: A/C  Oxygen Concentration (%):  (40) 40  Resp Rate Total:  (21 br/min-31 br/min) 27 br/min  Vt Set:  (300 mL) 300 mL  PEEP/CPAP:  (8 cmH20) 8 cmH20  Mean Airway Pressure:  (13 cmH20-15 cmH20) 13 cmH20   Abdominal: She exhibits distension. There is no abdominal tenderness.   Neurological:   Sedated    Skin: Skin is dry. No erythema.        bilateral CVC        Significant Labs:   CMP   Recent Labs   Lab 06/25/20  4319   06/25/20  2320 06/26/20  0323 06/26/20  0751 06/26/20  1025     138  138   < > 138 138  138 140 139   K 4.3  4.3  4.3   < > 3.5 4.1  4.1 2.7* 2.6*     102  102   < > 101 100  100 100 100   CO2 18*  18*  18*   < > 21* 20*  20* 24 24   *  139*  139*   < > 240* 199*  199* 209* 233*   BUN 86*  86*  86*   < > 104* 105*  105* 109* 110*   CREATININE 2.6*  2.6*  2.6*   < > 2.8* 3.0*  3.0* 3.0* 3.0*   CALCIUM 9.4  9.4  9.4   < > 8.3* 8.3*  8.3* 8.2* 8.1*   PROT 7.3  --   --  6.9  --   --    ALBUMIN 3.4*  3.4*   < > 3.0* 3.1* 3.1*  --    BILITOT 3.6*  --   --  3.1*  --   --    ALKPHOS 34*  --   --  36*  --   --    AST 56*  --   --  84*  --   --    *  --   --  115*  --   --    ANIONGAP 18*  18*  18*   < > 16 18*  18* 16 15   ESTGFRAFRICA 29.3*  29.3*  29.3*   < > 26.8* 24.7*  24.7* 24.7* 24.7*   EGFRNONAA 25.4*  25.4*  25.4*   < > 23.2* 21.4*  21.4* 21.4* 21.4*    < > = values in this interval not displayed.   , CBC   Recent Labs   Lab 06/26/20  0559 06/26/20  0751 06/26/20  1812   WBC 11.50 14.17* 17.86*   HGB 11.2* 11.3* 10.6*   HCT 37.5 36.1* 34.8*   PLT 75* 81* 84*    and All pertinent lab results from the last 24 hours have been reviewed.

## 2020-06-27 NOTE — ASSESSMENT & PLAN NOTE
Suspected secondary to liver dysfunction. Elevated factor VIII.  - S/p FFP and vitamin K at outside facility  - FFP (2 units given 6/26) - still INR elevated and thrombocytopenic   - No evidence of active bleeding

## 2020-06-27 NOTE — ASSESSMENT & PLAN NOTE
CVP 26, Repeated SvO2 39, CI 1.5, CO 2.6 and SVR 1300     She was started on Nitroprusside 0.3 mcg/kg/min at 1000 this morning.      Other Inotrope/support:              -  5 mcg/kg/min              - Epinephrine 0.04 mcg/kg/min               - Reyes 20 ppm     She is on Lasix 40 mg/hr with frequent intermitted doses of Diuril 500 mg, her UOP has been low  cc/hr, and her CVP still elevated at 26.     Assessment:  - Cardiogenic shock refractory to inotrope and nitroprusside   - Poor renal perfusion and sub-optimal UOP despite full current support.     Plan:  - Proceed with MCS with IABP to provide more cardiac support and afterload reduction               - She is at higher risk of invasive procedure given her elevated INR 2.1 and thrombocytopenia 81.                          - She received one units of FFP today.   - Proceed with CRRT for SCUF for slow continue ultrafiltration      Bi Castro MD

## 2020-06-27 NOTE — CHAPLAIN
Spiritual Care Encounter: Death  Thank you for allowing us to be apart of the patient and family's care at this most difficult time.  As well as your genuine care and compassion for the patient and their loved one's. Through this interdisciplinary teamwork  we were able to provide the the best possible patient and family centered care we could offer.        Purpose:  To provide emotional, spiritual and/or existential support to the patient and/or their loved one's during their time of grief/loss as well as to provide information to the family/staff/supports regarding the decedent care process, next steps, grief resources and the paperwork required for proper decedent documentation.    Visit Type: Follow-up  Visit Category: Death  Visited With: Patient and family together  Number of Family Visited: 15  Length of Visit: 60 Minutes  Continue Visiting: Yes  Referral From: Nurse  Referral To:            Encounter Summary:          Patient Spiritual Encounters  Care Provided: Decedent Care(DC paperwork completed by Nurse )  Patient Coping: Non-verbal   Family Spiritual Encounters  Care Provided: Reflective listening, Life review/ reflection, Compassionate presence, Assessed halima resources, Explored pt/family concerns, Grief care  Family Coping: Anxiety, Sadness, Open/discussion, Family/ friends resources  Comments - Family:  provided grief support to family, facilitated life review, and delivered grief packet to patient's mother. Extensive familial support.        Rituals/Sacrament's Attempted/Completed:  Anointed (06/24/20 1300 : Compa Gutierrez)     No Update to Plan of Care/Goals      Sadly, the patient has passed away and will therefore require no follow-up. There will be continued thoughts of peace and blessing for the patient's loved one's during this time of loss and grief. The Spiritual Care Department/ 's will  be reaching out to the family/loved ones/supports in the following days regarding  next steps as well as to provide emotional and spiritual  support.    Please contact the department of spiritual care with any questions you may have regarding the decedent care process and/or paperwork.        Zoila Marcos  Chaplain Resident  On-Call 24/7: #[phone]  Department of Spiritual Care - [facility] [place]

## 2020-06-27 NOTE — PROGRESS NOTES
[facility]  Nephrology  Progress Note    Patient Name: Yudy Jauregui  MRN: [id number]  Admission Date: 6/23/2020  Hospital Length of Stay: 4 days  Attending Provider: Kaitlin Gamboa MD   Primary Care Physician: Niurka Sinclair MD  Principal Problem:Cardiogenic shock    Subjective:     HPI: Yudy Jauregui is a 20 yo female with PMHx of seizure disorder, ADHD, and developmental delays who presented to [facility] on 6/19 with chief complaint of SOB, fatigue, and overall not feeling well.  Initial labs notable for transaminitis (/), presumed CHRISTIAN with a SCr of 1.6, metabolic acidosis with bicarb of 16.  She was not hypoxic at the time, vitals notable for sinus tachycardia.  She was tested negative for COVID-19 and underwent CTA of chest to r/o PE.  Results of the scan were negative for PE, but she was found to have cardiomegaly, bilateral pleural effusion, pericardial effusions and ascites and she was transferred to [facility] for higher level of care. No records from [facility] were present at the bedside, information was obtained from primary teams records.  She developed hypoxic respiratory failure and shock, thought to be cardiogenic in nature during her admission to the OSH.  She was started on Epi/Milrinone and diuresed with lasix/albumin and diuril and according to notes, these were discontinued 2/2 polyuria.  She was transferred to [facility] on 6/23 for higher level of care, admitted on Epi and milrinone which was later switched to dobutamine.  Labs on presentation showed a SCr of 1.8 which has increased to 2.1, BNP > 4900 and Nephrology was consulted for anuric CHRISTIAN.  She was given Lasix 80 mg and started on gtt @ 30 mg/hr and UOP had improved to ~ 60 cc/hr.  ECHO on 6/24 read as global hypokinesis with EF < 10%, MR, and PA pressures of 17.  Urinalysis performed on admission showing minimal protein of 30 g and trace blood.    Interval History: Worsening renal  function in the past 24 hrs, minimal UOP despite high dose diuretics and worsening acidosis. Increased pressor requirements. FiO2 60% from 40% yesterday.     Review of patient's allergies indicates:  No Known Allergies  Current Facility-Administered Medications   Medication Frequency    0.9%  NaCl infusion (for blood administration) Q24H PRN    acetaminophen oral solution 499.5074 mg Q6H PRN    bisacodyL suppository 10 mg Daily PRN    chlorhexidine 0.12 % solution 15 mL BID    dexmedetomidine (PRECEDEX) 400mcg/100mL 0.9% NaCL infusion Continuous    dextrose 10% (D10W) Bolus PRN    dextrose 50 % in water (D50W) injection 12.5 g PRN    DOBUTamine 500mg in D5W 250mL infusion (premix) (NON-TITRATING) Continuous    EPINEPHrine (ADRENALIN) 5 mg in sodium chloride 0.9% 250 mL infusion Continuous    famotidine (PF) injection 20 mg QHS    furosemide (LASIX) 500 mg infusion (conc: 10 mg/mL) Continuous    glucagon (human recombinant) injection 1 mg PRN    heparin (porcine) injection 5,000 Units Q8H    insulin aspart U-100 pen 0-5 Units Q6H PRN    lactulose 20 gram/30 mL solution Soln 20 g TID    levETIRAcetam (KEPPRA) 750 mg in dextrose 5 % 100 mL IVPB Q12H    meropenem (MERREM) 2 g in sodium chloride 0.9% 100 mL IVPB Q12H    nitric oxide gas Gas 20 ppm Continuous    nitroPRUSSide (NIPRIDE) 0.5 mg/mL in dextrose 5 % 100 mL infusion Continuous    norepinephrine 1 mg/mL injection     norepinephrine 16 mg in dextrose 5 % 250 mL infusion Continuous    polyethylene glycol packet 17 g BID    senna-docusate 8.6-50 mg per tablet 2 tablet BID    sodium chloride 0.9% flush 10 mL PRN    sodium chloride 0.9% flush 10 mL PRN    vancomycin - pharmacy to dose pharmacy to manage frequency    vasopressin (PITRESSIN) 0.2 Units/mL in dextrose 5 % 100 mL infusion Continuous    vasopressin (PITRESSIN) 20 unit/mL injection     white petrolatum-mineral oil (LUBIFRESH P.M.) ophthalmic ointment QHS       Objective:      Vital Signs (Most Recent):  Temp: (!) 103.6 °F (39.8 °C) (06/27/20 0900)  Pulse: (!) 127 (06/27/20 0900)  Resp: (!) 31 (06/27/20 0900)  BP: 95/61 (06/27/20 0900)  SpO2: 97 % (06/27/20 0900)  O2 Device (Oxygen Therapy): ventilator (06/27/20 0900) Vital Signs (24h Range):  Temp:  [99.5 °F (37.5 °C)-103.8 °F (39.9 °C)] 103.6 °F (39.8 °C)  Pulse:  [125-266] 127  Resp:  [24-38] 31  SpO2:  [85 %-100 %] 97 %  BP: ()/(50-71) 95/61  Arterial Line BP: ()/(37-59) 101/45     Weight: 72 kg (158 lb 11.7 oz) (06/25/20 0300)  Body mass index is 32.06 kg/m².  Body surface area is 1.73 meters squared.    I/O last 3 completed shifts:  In: 1746.9 [I.V.:636.9; NG/GT:360; IV Piggyback:750]  Out: 2666 [Urine:2666]    Physical Exam  Vitals signs and nursing note reviewed.   Constitutional:       Appearance: She is well-developed. She is ill-appearing. She is not diaphoretic.      Interventions: She is sedated and intubated.   HENT:      Head: Atraumatic.      Mouth/Throat:      Mouth: Mucous membranes are moist.   Eyes:      Extraocular Movements: Extraocular movements intact.      Conjunctiva/sclera: Conjunctivae normal.   Cardiovascular:      Rate and Rhythm: Regular rhythm. Tachycardia present.   Pulmonary:      Effort: She is intubated.      Breath sounds: No wheezing or rales.   Abdominal:      General: There is distension.      Palpations: Abdomen is soft.   Musculoskeletal:         General: Swelling present. No signs of injury.      Right lower leg: Edema present.      Left lower leg: Edema present.   Skin:     General: Skin is warm and dry.      Coloration: Skin is not jaundiced.      Findings: No rash.         Significant Labs:  ABGs:   Recent Labs   Lab 06/27/20  0801   PH 7.280*   PCO2 25.2*   HCO3 11.8*   POCSATURATED 91*   BE -15     CBC:   Recent Labs   Lab 06/27/20  0800   WBC 13.49*   RBC 3.67*   HGB 9.9*   HCT 32.5*   PLT 51*   MCV 89   MCH 27.0   MCHC 30.5*     CMP:   Recent Labs   Lab 06/27/20  0300  06/27/20  0800   *  149* 146*   CALCIUM 8.0*  8.0* 7.5*   ALBUMIN 3.1* 2.7*   PROT 6.6  --      141 141   K 4.3  4.3 5.3*   CO2 22*  22* 12*     102 104   *  119* 107*   CREATININE 3.6*  3.6* 3.9*   ALKPHOS 37*  --    ALT 96*  --    AST 67*  --    BILITOT 2.5*  --      All labs within the past 24 hours have been reviewed.         Assessment/Plan:     * Cardiogenic shock  - MGMT per primary team. CVP ~30 on evaluation. S/p IABP 6/26. On multiple pressors.      CHRISTIAN (acute kidney injury)  Non-Oliguric CHRISTIAN on Unknown Baseline Renal Function    At time of Consult: 18 yo female with PMHx of seizure disorder, ADHD, and developmental delays who presents as a transfer from OSH for higher level of care. Originally presented to ED with CC of SOB, fatigue and overall not feeling well.  Found to have a SCr of 1.6, elevated liver enzymes and tachycardia.  She underwent CTA (negative for PE) but found to have cardiomegally, pericardial effusion, pleural effusions and ascites and was later transfer to [facility], where she was found to be in cardiogenic shock.  She was transferred to [facility] for higher level of care and Nephrology was consulted for worsening kidney function.    CHRISTIAN thought to be 2/2 Acute tubular injury from cardiogenic shock  Possible component of NOHELIA from CTA obtained on admission.    Doesn't appear to have RPGN based on available labs.    6/24:  Urine Microscopy with WBC, RBC (non-dysmorphic) hyaline casts, fine/coarse granular casts         Plan/Recommendations:  - Will plan to start SLED x 6 hours mainly for metabolic clearance due to worsening acidosis, renal function and minimal UOP. UF to run net even at the beginning, then adjusted depending on hemodynamics. Family members oriented about treatment plan, agreed and expressed understanding.   -Keep MAP >65   -Maintain Hgb >7.0  -Renally dose meds and avoid nephrotoxic meds  -Strict I/O's  -Will continue to follow  closely           Thank you for your consult. I will follow-up with patient. Please contact us if you have any additional questions.    Jean Claude Smith MD  Nephrology  [facility]    ATTENDING PHYSICIAN ATTESTATION  I have personally verified the history and examined the patient. I thoroughly reviewed the demographic, clinical, laboratorial and imaging information available in medical records. I agree with the assessment and recommendations provided by the subspecialty resident who was under my supervision.

## 2020-06-27 NOTE — ASSESSMENT & PLAN NOTE
Unclear source. Cultures negative. ?CNS infection. Viral panels negative.  - F/u ID recommendations  - Continue broad spectrum, no signs of improvement from the shock stand point

## 2020-06-27 NOTE — SUBJECTIVE & OBJECTIVE
Interval History: Overnight, she underwent IABP for worsening hemodynamics. CVP 26, Repeated SvO2 39, CI 1.5, CO 2.6 and SVR 1300. For the first two hours after IABP placement, she showed signs of improvement in her hemodynamics. However, in the next couple of hours, her hemodynamics was worsening and we have to stop Nitroprusside and adding two more pressors to support her MAP. After discussion with family (see Plan of care note), they agreed to change the code status to DNR.     Continuous Infusions:   dexmedetomidine (PRECEDEX) infusion 0.6 mcg/kg/hr (06/27/20 0900)    DOBUTamine 5 mcg/kg/min (06/27/20 0900)    epinephrine 0.04 mcg/kg/min (06/27/20 0900)    furosemide (LASIX) 10 mg/mL infusion (non-titrating) 40 mg/hr (06/27/20 0900)    nitric oxide gas      nitroprusside Stopped (06/27/20 0605)    norepinephrine bitartrate-D5W 0.22 mcg/kg/min (06/27/20 0908)    vasopressin (PITRESSIN) infusion 0.04 Units/min (06/27/20 0900)     Scheduled Meds:   chlorhexidine  15 mL Mouth/Throat BID    famotidine (PF)  20 mg Intravenous QHS    heparin (porcine)  5,000 Units Subcutaneous Q8H    lactulose  20 g Per OG tube TID    levetiracetam IVPB  750 mg Intravenous Q12H    meropenem (MERREM) IVPB  2 g Intravenous Q12H    norepinephrine        polyethylene glycol  17 g Oral BID    senna-docusate 8.6-50 mg  2 tablet Oral BID    vasopressin        white petrolatum-mineral oiL   Both Eyes QHS     PRN Meds:sodium chloride, acetaminophen, bisacodyL, Dextrose 10% Bolus, dextrose 50 % in water (D50W), glucagon (human recombinant), insulin aspart U-100, sodium chloride 0.9%, sodium chloride 0.9%, vancomycin - pharmacy to dose    Review of patient's allergies indicates:  No Known Allergies  Objective:     Vital Signs (Most Recent):  Temp: (!) 103.6 °F (39.8 °C) (06/27/20 0900)  Pulse: (!) 127 (06/27/20 0900)  Resp: (!) 31 (06/27/20 0900)  BP: (!) 89/53 (06/27/20 0801)  SpO2: 97 % (06/27/20 0900) Vital Signs (24h  Range):  Temp:  [99.3 °F (37.4 °C)-103.8 °F (39.9 °C)] 103.6 °F (39.8 °C)  Pulse:  [125-266] 127  Resp:  [24-38] 31  SpO2:  [85 %-100 %] 97 %  BP: ()/(50-71) 89/53  Arterial Line BP: ()/(37-59) 101/45     Patient Vitals for the past 72 hrs (Last 3 readings):   Weight   06/25/20 0300 72 kg (158 lb 11.7 oz)   06/24/20 1701 76.5 kg (168 lb 10.4 oz)   06/24/20 1700 76.5 kg (168 lb 10.4 oz)     Body mass index is 32.06 kg/m².      Intake/Output Summary (Last 24 hours) at 6/27/2020 0910  Last data filed at 6/27/2020 0900  Gross per 24 hour   Intake 1143.84 ml   Output 1165 ml   Net -21.16 ml     Physical Exam  Vitals signs and nursing note reviewed.   Constitutional:       Appearance: She is ill-appearing.   HENT:      Head: Normocephalic.      Mouth/Throat:      Pharynx: Oropharyngeal exudate and posterior oropharyngeal erythema present.   Cardiovascular:      Rate and Rhythm: Tachycardia present.      Heart sounds: Murmur present. Gallop present.    Pulmonary:      Effort: Respiratory distress present.      Breath sounds: Wheezing and rales present.      Comments: Vent Mode: A/C  Oxygen Concentration (%):  (40-60) 60  Resp Rate Total:  (23 br/min-36 br/min) 27 br/min  Vt Set:  (300 mL-380 mL) 300 mL  PEEP/CPAP:  (8 cmH20) 8 cmH20  Mean Airway Pressure:  (12 xiA15-98 cmH20) 14 cmH20    Musculoskeletal:         General: Swelling present.      Right lower leg: Edema present.      Left lower leg: Edema present.   Skin:     Capillary Refill: Capillary refill takes 2 to 3 seconds.             Comments: Right IJ triple lumen.  Right CFA IABP    Neurological:      Comments: Sedated          Significant Labs:  CBC:  Recent Labs   Lab 06/26/20  1812 06/27/20  0540 06/27/20  0800   WBC 17.86* 15.32* 13.49*   RBC 3.97* 3.66* 3.67*   HGB 10.6* 10.0* 9.9*   HCT 34.8* 31.1* 32.5*   PLT 84* 56* 51*   MCV 88 85 89   MCH 26.7* 27.3 27.0   MCHC 30.5* 32.2 30.5*     BNP:  Recent Labs   Lab 06/23/20 2059   BNP >4,900*      CMP:  Recent Labs   Lab 06/26/20  0323  06/26/20  1820 06/26/20  2313 06/27/20  0300 06/27/20  0800   *  199*   < > 144* 150* 149*  149* 146*   CALCIUM 8.3*  8.3*   < > 7.9* 8.0* 8.0*  8.0* 7.5*   ALBUMIN 3.1*   < > 3.0* 3.1* 3.1* 2.7*   PROT 6.9  --  6.5  --  6.6  --      138   < > 142 139 141  141 141   K 4.1  4.1   < > 4.2 3.8 4.3  4.3 5.3*   CO2 20*  20*   < > 23 20* 22*  22* 12*     100   < > 103 102 102  102 104   *  105*   < > 112* 116* 119*  119* 107*   CREATININE 3.0*  3.0*   < > 3.3* 3.4* 3.6*  3.6* 3.9*   ALKPHOS 36*  --  40*  --  37*  --    *  --  108*  --  96*  --    AST 84*  --  73*  --  67*  --    BILITOT 3.1*  --  2.6*  --  2.5*  --     < > = values in this interval not displayed.      Coagulation:   Recent Labs   Lab 06/25/20  0321 06/26/20  0323 06/27/20  0300   INR 1.7* 2.1* 2.0*   APTT 26.0 29.2 30.0     LDH:  No results for input(s): LDH in the last 72 hours.  Microbiology:  Microbiology Results (last 7 days)     Procedure Component Value Units Date/Time    Blood Culture #1 **CANNOT BE ORDERED STAT** [[id number]] Collected: 06/25/20 2302    Order Status: Completed Specimen: Blood from Peripheral, Forearm, Left Updated: 06/27/20 0612     Blood Culture, Routine No Growth to date      No Growth to date    Blood Culture #1 **CANNOT BE ORDERED STAT** [[id number]] Collected: 06/25/20 2303    Order Status: Completed Specimen: Blood from Peripheral, Antecubital, Left Updated: 06/27/20 0612     Blood Culture, Routine No Growth to date      No Growth to date    Culture, Respiratory with Gram Stain [[id number]] Collected: 06/26/20 1540    Order Status: Completed Specimen: Respiratory from Tracheal Aspirate Updated: 06/26/20 1917     Gram Stain (Respiratory) <10 epithelial cells per low power field.     Gram Stain (Respiratory) Rare WBC's     Gram Stain (Respiratory) No organisms seen    Respiratory Infection Panel (PCR), Nasopharyngeal [[id number]]  Collected: 06/24/20 2328    Order Status: Completed Specimen: Nasopharyngeal Swab Updated: 06/25/20 1447     Respiratory Infection Panel Source NP Swab     Adenovirus Not Detected     Coronavirus 229E, Common Cold Virus Not Detected     Coronavirus HKU1, Common Cold Virus Not Detected     Coronavirus NL63, Common Cold Virus Not Detected     Coronavirus OC43, Common Cold Virus Not Detected     Comment: The Coronavirus strains detected in this test cause the common cold.  These strains are not the COVID-19 (novel Coronavirus)strain   associated with the respiratory disease outbreak.          Human Metapneumovirus Not Detected     Human Rhinovirus/Enterovirus Not Detected     Influenza A (subtypes H1, H1-2009,H3) Not Detected     Influenza B Not Detected     Parainfluenza Virus 1 Not Detected     Parainfluenza Virus 2 Not Detected     Parainfluenza Virus 3 Not Detected     Parainfluenza Virus 4 Not Detected     Respiratory Syncytial Virus Not Detected     Bordetella Parapertussis (OD1898) Not Detected     Bordetella pertussis (ptxP) Not Detected     Chlamydia pneumoniae Not Detected     Mycoplasma pneumoniae Not Detected     Comment: Respiratory Infection Panel testing performed by Multiplex PCR.       Narrative:      For all other respiratory sources, order [id number] -  Respiratory Viral Panel by PCR    CSF culture [[id number]]     Order Status: No result Specimen: CSF (Spinal Fluid) from CSF Tap, Tube 3     Gram stain [[id number]]     Order Status: No result Specimen: CSF (Spinal Fluid) from CSF Tap, Tube 3           I have reviewed all pertinent labs within the past 24 hours.    Estimated Creatinine Clearance: 25.9 mL/min (A) (based on SCr of 3.9 mg/dL (H)).    Diagnostic Results:  I have reviewed all pertinent imaging results/findings within the past 24 hours.  I have reviewed and interpreted all pertinent imaging results/findings within the past 24 hours.

## 2020-06-27 NOTE — CARE UPDATE
Hemodynamics update for Yudy Jauregui     Objective:   Vitals:    06/26/20 2103   BP: 90/53   Pulse: (!) 137   Resp: (!) 28   Temp: (!) 102.6 °F (39.2 °C)     Hemodynamics:   Parameter  6/26 at 1900 6/27 at 0000   CVP 26 24   SvO2 39 48   UOP 40 cc/hr 40-60 cc/hr   CI  1.5 2   CO 2.6 3.4   SVR 1300 965     Current Heart Failure Medications:              -  5 mcg/kg/min              - Epinephrine 0.04 mcg/kg/min               - Reyes 20 ppm   - Nitroprusside 0.3 mcg/kg/min as her BP can tolerate    - Lasix 40 mg/hr    - Diuril 500 IV intermittently     Current Mechanical Circulatory Support:  - Left CFA IABP with 1:1 - placed at 2200    Assessment/Plan:  - Improvement in her cardiogenic shock   - Continue aggressive diuresis   - Repeat above hemodynamics in 4 hours to ensure improvement      Bi Castro MD  Cardiology Fellow (PGY-IV)  Pager: [phone]

## 2020-06-27 NOTE — PROGRESS NOTES
Dialysis  To bedside CRRT initiated via left femoral catheter, 3k 35bic bath UF started even. See flow sheet for details

## 2020-06-27 NOTE — ASSESSMENT & PLAN NOTE
EF <10% with LVEDD 6.5 cm. Mod fxnl MR. Mod LAE. Mod RV dysfunction. PASP at least 17 mmHg.   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 Other etiologies could be viral myocarditis or CM related to septic shock    - Overnight she showed worsening in her hemodynamics including CVP 26, Repeated SvO2 39, CI 1.5, CO 2.6 and SVR 1300.   - She underwent IABP on 6/26/2020 at 2100. initially she showed sings of improvement, however, for the next couple of hours, she decompensated again and we added two more pressor to support her MAP.   - HD: is CVP 28, SvO2 36, CI 1.5, CO 2.7 and  on IABP 1:1 and following pressors:     - Current status is DNR with no escalation of care.

## 2020-06-27 NOTE — BRIEF OP NOTE
"    Post Cath Note  Referring Physician: Kaitlin Gamboa MD  Procedure: INSERTION, INTRA-AORTIC BALLOON PUMP (Bilateral)       Access: Right CFA    Procedure done at bedside with ultrasound guidance.  R CFA access obtained with 4Fr micropuncture kit, upsized to an 8Fr sheath.  IABP placed through 8Fr sheath, secured with suture, stat locks, and clear dressing.    Successful placement of 7.5 Fr 34 cc IABP via R CFA Access.    Appropriate placement of IABP verified by CXR.    Post Cath Exam:   BP (!) 86/54   Pulse (!) 130   Temp (!) 101.8 °F (38.8 °C)   Resp (!) 27   Ht 4' 11" (1.499 m)   Wt 72 kg (158 lb 11.7 oz)   LMP 05/20/2020 (Exact Date)   SpO2 98%   Breastfeeding No   BMI 32.06 kg/m²   No unusual pain, hematoma, thrill or bruit at vascular access site.  Distal pulse present without signs of ischemia.    Recommendations:   - Routine post-cath care  - Recommend heparin gtt while IABP is in place  - Management per HTS    Signed:  Irineo Ward MD  Interventional Cardiology Fellow, PGY-7  6/27/2020 1:34 AM  "

## 2020-06-27 NOTE — SUBJECTIVE & OBJECTIVE
Interval History: noted events overnight, IABP emergently placed, fevers to 103, increasing lactic acidosis, patient is on maximum vasopressor support, increasing vent settings, worsening BUN, made DNR, starting CRRT today. No identified infectious etiology.     Review of Systems   Unable to perform ROS: Acuity of condition     Objective:     Vital Signs (Most Recent):  Temp: (!) 103.6 °F (39.8 °C) (06/27/20 0900)  Pulse: (!) 127 (06/27/20 0900)  Resp: (!) 31 (06/27/20 0900)  BP: 95/61 (06/27/20 0900)  SpO2: 97 % (06/27/20 0900) Vital Signs (24h Range):  Temp:  [99.5 °F (37.5 °C)-103.8 °F (39.9 °C)] 103.6 °F (39.8 °C)  Pulse:  [125-266] 127  Resp:  [24-38] 31  SpO2:  [85 %-100 %] 97 %  BP: ()/(50-68) 95/61  Arterial Line BP: ()/(37-59) 101/45     Weight: 72 kg (158 lb 11.7 oz)  Body mass index is 32.06 kg/m².    Estimated Creatinine Clearance: 25.9 mL/min (A) (based on SCr of 3.9 mg/dL (H)).    Physical Exam  Vitals signs and nursing note reviewed.   Constitutional:       Appearance: She is well-developed.      Comments: Intubated and sedated   HENT:      Head: Normocephalic and atraumatic.      Nose: No congestion or rhinorrhea.      Mouth/Throat:      Comments: OG tube, no drainage in suction cannister  Eyes:      General: Scleral icterus present.      Pupils: Pupils are equal, round, and reactive to light.   Neck:      Musculoskeletal: Normal range of motion and neck supple.      Comments: LIJ central line removed. RIJ placed  Cardiovascular:      Rate and Rhythm: Normal rate and regular rhythm.      Heart sounds: Normal heart sounds. No murmur. No friction rub.   Pulmonary:      Effort: Pulmonary effort is normal. No respiratory distress.      Breath sounds: No wheezing or rales.      Comments: ET tube in place, mechanical breath sounds  Abdominal:      General: There is no distension.      Palpations: Abdomen is soft.      Tenderness: There is no abdominal tenderness. There is no guarding or  rebound.      Comments: Absent bowel sounds   Musculoskeletal: Normal range of motion.   Lymphadenopathy:      Cervical: No cervical adenopathy.   Skin:     General: Skin is warm and dry.      Coloration: Skin is not pale.      Findings: No erythema.      Comments: R brachial A line, IABP         Significant Labs:   CBC:   Recent Labs   Lab 06/26/20  1812 06/27/20  0540 06/27/20  0800   WBC 17.86* 15.32* 13.49*   HGB 10.6* 10.0* 9.9*   HCT 34.8* 31.1* 32.5*   PLT 84* 56* 51*     CMP:   Recent Labs   Lab 06/26/20  0323  06/26/20  1820 06/26/20  2313 06/27/20  0300 06/27/20  0800     138   < > 142 139 141  141 141   K 4.1  4.1   < > 4.2 3.8 4.3  4.3 5.3*     100   < > 103 102 102  102 104   CO2 20*  20*   < > 23 20* 22*  22* 12*   *  199*   < > 144* 150* 149*  149* 146*   *  105*   < > 112* 116* 119*  119* 107*   CREATININE 3.0*  3.0*   < > 3.3* 3.4* 3.6*  3.6* 3.9*   CALCIUM 8.3*  8.3*   < > 7.9* 8.0* 8.0*  8.0* 7.5*   PROT 6.9  --  6.5  --  6.6  --    ALBUMIN 3.1*   < > 3.0* 3.1* 3.1* 2.7*   BILITOT 3.1*  --  2.6*  --  2.5*  --    ALKPHOS 36*  --  40*  --  37*  --    AST 84*  --  73*  --  67*  --    *  --  108*  --  96*  --    ANIONGAP 18*  18*   < > 16 17* 17*  17* 25*   EGFRNONAA 21.4*  21.4*   < > 19.1* 18.4* 17.2*  17.2* 15.6*    < > = values in this interval not displayed.     Microbiology Results (last 7 days)     Procedure Component Value Units Date/Time    Blood Culture #1 **CANNOT BE ORDERED STAT** [[id number]] Collected: 06/25/20 2302    Order Status: Completed Specimen: Blood from Peripheral, Forearm, Left Updated: 06/27/20 0612     Blood Culture, Routine No Growth to date      No Growth to date    Blood Culture #1 **CANNOT BE ORDERED STAT** [[id number]] Collected: 06/25/20 2303    Order Status: Completed Specimen: Blood from Peripheral, Antecubital, Left Updated: 06/27/20 0612     Blood Culture, Routine No Growth to date      No Growth to date     Culture, Respiratory with Gram Stain [[id number]] Collected: 06/26/20 1540    Order Status: Completed Specimen: Respiratory from Tracheal Aspirate Updated: 06/26/20 1917     Gram Stain (Respiratory) <10 epithelial cells per low power field.     Gram Stain (Respiratory) Rare WBC's     Gram Stain (Respiratory) No organisms seen    Respiratory Infection Panel (PCR), Nasopharyngeal [[id number]] Collected: 06/24/20 2328    Order Status: Completed Specimen: Nasopharyngeal Swab Updated: 06/25/20 1447     Respiratory Infection Panel Source NP Swab     Adenovirus Not Detected     Coronavirus 229E, Common Cold Virus Not Detected     Coronavirus HKU1, Common Cold Virus Not Detected     Coronavirus NL63, Common Cold Virus Not Detected     Coronavirus OC43, Common Cold Virus Not Detected     Comment: The Coronavirus strains detected in this test cause the common cold.  These strains are not the COVID-19 (novel Coronavirus)strain   associated with the respiratory disease outbreak.          Human Metapneumovirus Not Detected     Human Rhinovirus/Enterovirus Not Detected     Influenza A (subtypes H1, H1-2009,H3) Not Detected     Influenza B Not Detected     Parainfluenza Virus 1 Not Detected     Parainfluenza Virus 2 Not Detected     Parainfluenza Virus 3 Not Detected     Parainfluenza Virus 4 Not Detected     Respiratory Syncytial Virus Not Detected     Bordetella Parapertussis (SI1826) Not Detected     Bordetella pertussis (ptxP) Not Detected     Chlamydia pneumoniae Not Detected     Mycoplasma pneumoniae Not Detected     Comment: Respiratory Infection Panel testing performed by Multiplex PCR.       Narrative:      For all other respiratory sources, order [id number] -  Respiratory Viral Panel by PCR    CSF culture [[id number]]     Order Status: No result Specimen: CSF (Spinal Fluid) from CSF Tap, Tube 3     Gram stain [[id number]]     Order Status: No result Specimen: CSF (Spinal Fluid) from CSF Tap, Tube 3           Significant  Imaging: I have reviewed all pertinent imaging results/findings within the past 24 hours.  None

## 2020-06-27 NOTE — PROGRESS NOTES
[facility]  Heart Transplant  Progress Note    Patient Name: Yudy Jauregui  MRN: [id number]  Admission Date: 6/23/2020  Hospital Length of Stay: 4 days  Attending Physician: Kaitlin Gamboa MD  Primary Care Provider: Niurka Sinclair MD  Principal Problem:Cardiogenic shock    Subjective:     Interval History: Overnight, she underwent IABP for worsening hemodynamics. CVP 26, Repeated SvO2 39, CI 1.5, CO 2.6 and SVR 1300. For the first two hours after IABP placement, she showed signs of improvement in her hemodynamics. However, in the next couple of hours, her hemodynamics was worsening and we have to stop Nitroprusside and adding two more pressors to support her MAP. After discussion with family (see Plan of care note), they agreed to change the code status to DNR.     Continuous Infusions:   dexmedetomidine (PRECEDEX) infusion 0.6 mcg/kg/hr (06/27/20 0900)    DOBUTamine 5 mcg/kg/min (06/27/20 0900)    epinephrine 0.04 mcg/kg/min (06/27/20 0900)    furosemide (LASIX) 10 mg/mL infusion (non-titrating) 40 mg/hr (06/27/20 0900)    nitric oxide gas      nitroprusside Stopped (06/27/20 0605)    norepinephrine bitartrate-D5W 0.22 mcg/kg/min (06/27/20 0908)    vasopressin (PITRESSIN) infusion 0.04 Units/min (06/27/20 0900)     Scheduled Meds:   chlorhexidine  15 mL Mouth/Throat BID    famotidine (PF)  20 mg Intravenous QHS    heparin (porcine)  5,000 Units Subcutaneous Q8H    lactulose  20 g Per OG tube TID    levetiracetam IVPB  750 mg Intravenous Q12H    meropenem (MERREM) IVPB  2 g Intravenous Q12H    norepinephrine        polyethylene glycol  17 g Oral BID    senna-docusate 8.6-50 mg  2 tablet Oral BID    vasopressin        white petrolatum-mineral oiL   Both Eyes QHS     PRN Meds:sodium chloride, acetaminophen, bisacodyL, Dextrose 10% Bolus, dextrose 50 % in water (D50W), glucagon (human recombinant), insulin aspart U-100, sodium chloride 0.9%, sodium chloride 0.9%, vancomycin - pharmacy  to dose    Review of patient's allergies indicates:  No Known Allergies  Objective:     Vital Signs (Most Recent):  Temp: (!) 103.6 °F (39.8 °C) (06/27/20 0900)  Pulse: (!) 127 (06/27/20 0900)  Resp: (!) 31 (06/27/20 0900)  BP: (!) 89/53 (06/27/20 0801)  SpO2: 97 % (06/27/20 0900) Vital Signs (24h Range):  Temp:  [99.3 °F (37.4 °C)-103.8 °F (39.9 °C)] 103.6 °F (39.8 °C)  Pulse:  [125-266] 127  Resp:  [24-38] 31  SpO2:  [85 %-100 %] 97 %  BP: ()/(50-71) 89/53  Arterial Line BP: ()/(37-59) 101/45     Patient Vitals for the past 72 hrs (Last 3 readings):   Weight   06/25/20 0300 72 kg (158 lb 11.7 oz)   06/24/20 1701 76.5 kg (168 lb 10.4 oz)   06/24/20 1700 76.5 kg (168 lb 10.4 oz)     Body mass index is 32.06 kg/m².      Intake/Output Summary (Last 24 hours) at 6/27/2020 0910  Last data filed at 6/27/2020 0900  Gross per 24 hour   Intake 1143.84 ml   Output 1165 ml   Net -21.16 ml     Physical Exam  Vitals signs and nursing note reviewed.   Constitutional:       Appearance: She is ill-appearing.   HENT:      Head: Normocephalic.      Mouth/Throat:      Pharynx: Oropharyngeal exudate and posterior oropharyngeal erythema present.   Cardiovascular:      Rate and Rhythm: Tachycardia present.      Heart sounds: Murmur present. Gallop present.    Pulmonary:      Effort: Respiratory distress present.      Breath sounds: Wheezing and rales present.      Comments: Vent Mode: A/C  Oxygen Concentration (%):  (40-60) 60  Resp Rate Total:  (23 br/min-36 br/min) 27 br/min  Vt Set:  (300 mL-380 mL) 300 mL  PEEP/CPAP:  (8 cmH20) 8 cmH20  Mean Airway Pressure:  (12 ghR78-04 cmH20) 14 cmH20    Musculoskeletal:         General: Swelling present.      Right lower leg: Edema present.      Left lower leg: Edema present.   Skin:     Capillary Refill: Capillary refill takes 2 to 3 seconds.             Comments: Right IJ triple lumen.  Right CFA IABP    Neurological:      Comments: Sedated          Significant Labs:  CBC:  Recent  Labs   Lab 06/26/20  1812 06/27/20  0540 06/27/20  0800   WBC 17.86* 15.32* 13.49*   RBC 3.97* 3.66* 3.67*   HGB 10.6* 10.0* 9.9*   HCT 34.8* 31.1* 32.5*   PLT 84* 56* 51*   MCV 88 85 89   MCH 26.7* 27.3 27.0   MCHC 30.5* 32.2 30.5*     BNP:  Recent Labs   Lab 06/23/20 2059   BNP >4,900*     CMP:  Recent Labs   Lab 06/26/20 0323 06/26/20  1820 06/26/20  2313 06/27/20  0300 06/27/20  0800   *  199*   < > 144* 150* 149*  149* 146*   CALCIUM 8.3*  8.3*   < > 7.9* 8.0* 8.0*  8.0* 7.5*   ALBUMIN 3.1*   < > 3.0* 3.1* 3.1* 2.7*   PROT 6.9  --  6.5  --  6.6  --      138   < > 142 139 141  141 141   K 4.1  4.1   < > 4.2 3.8 4.3  4.3 5.3*   CO2 20*  20*   < > 23 20* 22*  22* 12*     100   < > 103 102 102  102 104   *  105*   < > 112* 116* 119*  119* 107*   CREATININE 3.0*  3.0*   < > 3.3* 3.4* 3.6*  3.6* 3.9*   ALKPHOS 36*  --  40*  --  37*  --    *  --  108*  --  96*  --    AST 84*  --  73*  --  67*  --    BILITOT 3.1*  --  2.6*  --  2.5*  --     < > = values in this interval not displayed.      Coagulation:   Recent Labs   Lab 06/25/20 0321 06/26/20 0323 06/27/20  0300   INR 1.7* 2.1* 2.0*   APTT 26.0 29.2 30.0     LDH:  No results for input(s): LDH in the last 72 hours.  Microbiology:  Microbiology Results (last 7 days)     Procedure Component Value Units Date/Time    Blood Culture #1 **CANNOT BE ORDERED STAT** [[id number]] Collected: 06/25/20 2302    Order Status: Completed Specimen: Blood from Peripheral, Forearm, Left Updated: 06/27/20 0612     Blood Culture, Routine No Growth to date      No Growth to date    Blood Culture #1 **CANNOT BE ORDERED STAT** [[id number]] Collected: 06/25/20 2303    Order Status: Completed Specimen: Blood from Peripheral, Antecubital, Left Updated: 06/27/20 0612     Blood Culture, Routine No Growth to date      No Growth to date    Culture, Respiratory with Gram Stain [[id number]] Collected: 06/26/20 1540    Order Status: Completed Specimen:  Respiratory from Tracheal Aspirate Updated: 06/26/20 1917     Gram Stain (Respiratory) <10 epithelial cells per low power field.     Gram Stain (Respiratory) Rare WBC's     Gram Stain (Respiratory) No organisms seen    Respiratory Infection Panel (PCR), Nasopharyngeal [[id number]] Collected: 06/24/20 2328    Order Status: Completed Specimen: Nasopharyngeal Swab Updated: 06/25/20 1447     Respiratory Infection Panel Source NP Swab     Adenovirus Not Detected     Coronavirus 229E, Common Cold Virus Not Detected     Coronavirus HKU1, Common Cold Virus Not Detected     Coronavirus NL63, Common Cold Virus Not Detected     Coronavirus OC43, Common Cold Virus Not Detected     Comment: The Coronavirus strains detected in this test cause the common cold.  These strains are not the COVID-19 (novel Coronavirus)strain   associated with the respiratory disease outbreak.          Human Metapneumovirus Not Detected     Human Rhinovirus/Enterovirus Not Detected     Influenza A (subtypes H1, H1-2009,H3) Not Detected     Influenza B Not Detected     Parainfluenza Virus 1 Not Detected     Parainfluenza Virus 2 Not Detected     Parainfluenza Virus 3 Not Detected     Parainfluenza Virus 4 Not Detected     Respiratory Syncytial Virus Not Detected     Bordetella Parapertussis (WN5111) Not Detected     Bordetella pertussis (ptxP) Not Detected     Chlamydia pneumoniae Not Detected     Mycoplasma pneumoniae Not Detected     Comment: Respiratory Infection Panel testing performed by Multiplex PCR.       Narrative:      For all other respiratory sources, order [id number] -  Respiratory Viral Panel by PCR    CSF culture [[id number]]     Order Status: No result Specimen: CSF (Spinal Fluid) from CSF Tap, Tube 3     Gram stain [[id number]]     Order Status: No result Specimen: CSF (Spinal Fluid) from CSF Tap, Tube 3           I have reviewed all pertinent labs within the past 24 hours.    Estimated Creatinine Clearance: 25.9 mL/min (A) (based on SCr of  3.9 mg/dL (H)).    Diagnostic Results:  I have reviewed all pertinent imaging results/findings within the past 24 hours.  I have reviewed and interpreted all pertinent imaging results/findings within the past 24 hours.    Assessment and Plan:     Ms. Yudy Jauregui is a 19 y.o.female with prior history of focal epilepsy with complex partial seizures, developmental delay and ADHD. She presented to [facility] on 2020 with symptoms of shortness of breath, fatigue, and feeling unwell. Further work up showed that she was in cardiogenic shock. Further work up showed transaminitis, acute kidney injury and acute hypoxic respiratory failure requiring mechanical ventilation. During her stay at [facility], she spiked fever 100.8 and continued on inotrope support (see below). She was on Lasix and Albumin and Diuril which was held secondary to elevated urine output for the last lasts. He ventilation setting improved and her FiO2 decrease to medium setting oxygenations. She was on solumedrol and received IVIG 100g as well. Echo noted to have mod mitral valve insufficiency, mild tricuspid valve insufficiency, moderate LV dilation, decreased RV and LV systolic function, small pericardial effusion and bilateral pleural effusion; EF ~20%. Repeat echo with similar results. On arrival she was on multiple infusions including Milrinone Infusion 0.5 mcg/kg/min, Heparin 1 unit/mL, Epi 0.03 mcg/kg/min, and sedated with Fentanyl 100 mcg/hr and dexmedetomidine 0.5 mcg/kg/hr.     Ped History:  Born full term and born with    She developed RSV as two weeks old  Developmental delay and interllectula diability and ADHD and she was on different medications     Family History:  No first degree relative with heart disease   She has a a sibling sister who passed at age of 12 months with developmental dealy related to the brain (not to heart).     * Cardiogenic shock  EF <10%  with LVEDD 6.5 cm. Mod fxnl MR. Mod LAE. Mod RV dysfunction. PASP at least 17 mmHg.   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 Other etiologies could be viral myocarditis or CM related to septic shock    - Overnight she showed worsening in her hemodynamics including CVP 26, Repeated SvO2 39, CI 1.5, CO 2.6 and SVR 1300.   - She underwent IABP on 6/26/2020 at 2100. initially she showed sings of improvement, however, for the next couple of hours, she decompensated again and we added two more pressor to support her MAP.   - HD: is CVP 28, SvO2 36, CI 1.5, CO 2.7 and  on IABP 1:1 and following pressors:   - Add more pressors including: Levo and ADH analogue and stress dose steroids.     - Current status is DNR with no escalation of care.    Fever  Unclear source. Cultures negative. ?CNS infection. Viral panels negative.  - F/u ID recommendations  - Continue broad spectrum, no signs of improvement from the shock stand point     CHRISTIAN (acute kidney injury)  Oliguric CHRISTIAN secondary to CRS  - Nephro following  - Avoid nephrotoxic agents, Renally dose meds  - Will attempt to rune RRT if her blood pressure able to tolerate     Coagulation defect, unspecified  Suspected secondary to liver dysfunction. Elevated factor VIII.  - S/p FFP and vitamin K at outside facility  - FFP (2 units given 6/26) - still INR elevated and thrombocytopenic   - No evidence of active bleeding    Seizure disorder  - Continue her Keppra at her home dose 750 mg BID. EEG without evidence of seizures    ADHD  - Hold Lisdexamfetamine to avoid counteraction with sedative medications     Acute respiratory failure  - Weaning for goal of Vt 270 (6 ml/kg ideal body weight)  Vent Mode: A/C  Oxygen Concentration (%):  [40-60] 60  Resp Rate Total:  [23 br/min-36 br/min] 27 br/min  Vt Set:  [300 mL-380 mL] 300 mL  PEEP/CPAP:  [8 cmH20] 8 cmH20  Mean Airway Pressure:  [12 buE58-31 cmH20] 14 cmH20   - CXR on admit c/w CHF    Yvon Castro MD  Heart  Transplant  [facility]

## 2020-06-27 NOTE — ASSESSMENT & PLAN NOTE
- Weaning for goal of Vt 270 (6 ml/kg ideal body weight)  Vent Mode: A/C  Oxygen Concentration (%):  [40-60] 60  Resp Rate Total:  [23 br/min-36 br/min] 27 br/min  Vt Set:  [300 mL-380 mL] 300 mL  PEEP/CPAP:  [8 cmH20] 8 cmH20  Mean Airway Pressure:  [12 npL92-05 cmH20] 14 cmH20   - CXR on admit c/w CHF
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
## 2020-06-27 NOTE — PROGRESS NOTES
Pharmacokinetic Assessment Follow Up: IV Vancomycin     Vancomycin serum concentration assessment(s):     - Vanc random this AM is 21.9 mcg/mL, down from 27.1 mcg/mL the previous day  - Dosing by level in the setting of CHRISTIAN, UOP dropping off, planning 6-hr SLED today  - Hold vanc today to allow for further clearance  - Draw AM random level tomorrow 6/28  - Will re-dose based on level and RRT plan       Thank you for the consult,   Precious Nguyen, PharmD, [facility]  Neurocritical Care Pharmacist  [id number]        Drug levels (last 3 results):  Recent Labs   Lab Result Units 06/25/20  1111 06/26/20  0323 06/27/20  0300   Vancomycin, Random ug/mL 35.4 27.1 21.9       Patient brief summary:  Yudy Jauregui is a 19 y.o. female initiated on antimicrobial therapy with IV Vancomycin for treatment of meningitis    The patient's current regimen is pulse dosing    Drug Allergies:   Review of patient's allergies indicates:  No Known Allergies    Actual Body Weight:   72 kg    Renal Function:   Estimated Creatinine Clearance: 25.9 mL/min (A) (based on SCr of 3.9 mg/dL (H)).,     Dialysis Method (if applicable):  N/A    CBC (last 72 hours):  Recent Labs   Lab Result Units 06/24/20  1558 06/24/20  1838 06/25/20  0919 06/25/20  1838 06/26/20  0559 06/26/20  0751 06/26/20  1812 06/27/20  0540 06/27/20  0800   WBC K/uL 9.59 9.65 9.46 10.26 11.50 14.17* 17.86* 15.32* 13.49*   Hemoglobin g/dL 10.7* 10.8* 10.8* 11.3* 11.2* 11.3* 10.6* 10.0* 9.9*   Hematocrit % 34.4* 34.6* 35.5* 36.4* 37.5 36.1* 34.8* 31.1* 32.5*   Platelets K/uL 91* 80* 84* 97* 75* 81* 84* 56* 51*   Gran% % 87.8* 86.3* 86.7* 85.3* 86.2* 86.5* 86.2* 86.4* 88.9*   Lymph% % 4.1* 4.5* 5.0* 5.3* 5.6* 5.4* 4.0* 5.0* 3.8*   Mono% % 7.7 8.7 7.6 8.4 7.7 7.4 9.1 7.4 6.2   Eosinophil% % 0.0 0.0 0.0 0.0 0.0 0.0 0.0 0.0 0.0   Basophil% % 0.0 0.0 0.1 0.1 0.0 0.1 0.1 0.1 0.1   Differential Method  Automated Automated Automated Automated Automated Automated Automated Automated Automated        Metabolic Panel (last 72 hours):  Recent Labs   Lab Result Units 06/24/20  1558 06/25/20  0321 06/25/20  0919 06/25/20  1111 06/25/20  1538 06/25/20  2245 06/25/20  2320 06/26/20  0323 06/26/20  0751 06/26/20  1025 06/26/20  1812 06/26/20  1820 06/26/20  2313 06/27/20  0300 06/27/20  0800   Sodium mmol/L 136  136 138  138  138 SEE COMMENT 139 139  --  138 138  138 140 139 141  141 142 139 141  141 141   Potassium mmol/L 4.2  4.2 4.3  4.3  4.3 SEE COMMENT 3.8 3.8  --  3.5 4.1  4.1 2.7* 2.6* 4.2  4.2 4.2 3.8 4.3  4.3 5.3*   Chloride mmol/L 103  103 102  102  102 SEE COMMENT 98 106  --  101 100  100 100 100 102  102 103 102 102  102 104   CO2 mmol/L 19*  19* 18*  18*  18* SEE COMMENT 21* 17*  --  21* 20*  20* 24 24 23  23 23 20* 22*  22* 12*   Glucose mg/dL 147*  147* 139*  139*  139* SEE COMMENT 216* 126*  --  240* 199*  199* 209* 233* 147*  147* 144* 150* 149*  149* 146*   Glucose, UA   --   --   --   --   --  Negative  --   --   --   --   --   --   --   --   --    BUN, Bld mg/dL 72*  72* 86*  86*  86* SEE COMMENT 93* 92*  --  104* 105*  105* 109* 110* 114*  114* 112* 116* 119*  119* 107*   Creatinine mg/dL 2.2*  2.2* 2.6*  2.6*  2.6* SEE COMMENT 2.7* 2.5*  --  2.8* 3.0*  3.0* 3.0* 3.0* 3.3*  3.3* 3.3* 3.4* 3.6*  3.6* 3.9*   Albumin g/dL 3.5  3.5 3.4*  3.4* SEE COMMENT 3.0* 3.1*  --  3.0* 3.1* 3.1*  --  3.0* 3.0* 3.1* 3.1* 2.7*   Total Bilirubin mg/dL  --  3.6*  --   --   --   --   --  3.1*  --   --   --  2.6*  --  2.5*  --    Alkaline Phosphatase U/L  --  34*  --   --   --   --   --  36*  --   --   --  40*  --  37*  --    AST U/L  --  56*  --   --   --   --   --  84*  --   --   --  73*  --  67*  --    ALT U/L  --  120*  --   --   --   --   --  115*  --   --   --  108*  --  96*  --    Magnesium mg/dL  --  2.2  --   --   --   --   --  2.2  --   --   --   --   --  2.2  --    Phosphorus mg/dL 4.5  4.5 5.1*  5.1* SEE COMMENT 5.4* 4.5  --  4.7* 5.0* 5.2*  --  5.0*  --   4.5 4.6* 6.6*       Vancomycin Administrations:  vancomycin given in the last 96 hours                   vancomycin 1.5 g in dextrose 5% 250 mL IVPB (g) 1.5 g New Bag 06/24/20 0648                Microbiologic Results:  Microbiology Results (last 7 days)     Procedure Component Value Units Date/Time    Blood Culture #1 **CANNOT BE ORDERED STAT** [[id number]] Collected: 06/25/20 2302    Order Status: Completed Specimen: Blood from Peripheral, Forearm, Left Updated: 06/27/20 0612     Blood Culture, Routine No Growth to date      No Growth to date    Blood Culture #1 **CANNOT BE ORDERED STAT** [[id number]] Collected: 06/25/20 2303    Order Status: Completed Specimen: Blood from Peripheral, Antecubital, Left Updated: 06/27/20 0612     Blood Culture, Routine No Growth to date      No Growth to date    Culture, Respiratory with Gram Stain [[id number]] Collected: 06/26/20 1540    Order Status: Completed Specimen: Respiratory from Tracheal Aspirate Updated: 06/26/20 1917     Gram Stain (Respiratory) <10 epithelial cells per low power field.     Gram Stain (Respiratory) Rare WBC's     Gram Stain (Respiratory) No organisms seen    Respiratory Infection Panel (PCR), Nasopharyngeal [[id number]] Collected: 06/24/20 2328    Order Status: Completed Specimen: Nasopharyngeal Swab Updated: 06/25/20 1447     Respiratory Infection Panel Source NP Swab     Adenovirus Not Detected     Coronavirus 229E, Common Cold Virus Not Detected     Coronavirus HKU1, Common Cold Virus Not Detected     Coronavirus NL63, Common Cold Virus Not Detected     Coronavirus OC43, Common Cold Virus Not Detected     Comment: The Coronavirus strains detected in this test cause the common cold.  These strains are not the COVID-19 (novel Coronavirus)strain   associated with the respiratory disease outbreak.          Human Metapneumovirus Not Detected     Human Rhinovirus/Enterovirus Not Detected     Influenza A (subtypes H1, H1-2009,H3) Not Detected     Influenza B  Not Detected     Parainfluenza Virus 1 Not Detected     Parainfluenza Virus 2 Not Detected     Parainfluenza Virus 3 Not Detected     Parainfluenza Virus 4 Not Detected     Respiratory Syncytial Virus Not Detected     Bordetella Parapertussis (OV3093) Not Detected     Bordetella pertussis (ptxP) Not Detected     Chlamydia pneumoniae Not Detected     Mycoplasma pneumoniae Not Detected     Comment: Respiratory Infection Panel testing performed by Multiplex PCR.       Narrative:      For all other respiratory sources, order [id number] -  Respiratory Viral Panel by PCR    CSF culture [[id number]]     Order Status: No result Specimen: CSF (Spinal Fluid) from CSF Tap, Tube 3     Gram stain [[id number]]     Order Status: No result Specimen: CSF (Spinal Fluid) from CSF Tap, Tube 3

## 2020-06-27 NOTE — PROCEDURES
"Yudy Jauregui is a 19 y.o. female patient.    Temp: (!) 101.1 °F (38.4 °C) (06/26/20 1800)  Pulse: (!) 134 (06/26/20 1800)  Resp: (!) 26 (06/26/20 1800)  BP: (!) 81/51 (06/26/20 1800)  SpO2: 97 % (06/26/20 1800)  Weight: 72 kg (158 lb 11.7 oz) (06/25/20 0300)  Height: 4' 11" (149.9 cm) (06/25/20 1530)    Central Line    Date/Time: 6/26/2020 8:20 PM  Performed by: Abe Wilder MD  Consent Done: Yes  Time out: Immediately prior to procedure a "time out" was called to verify the correct patient, procedure, equipment, support staff and site/side marked as required.  Indications: med administration, hemodynamic monitoring and vascular access  Anesthesia: local infiltration    Anesthesia:  Local Anesthetic: lidocaine 1% without epinephrine  Preparation: skin prepped with ChloraPrep  Location details: right internal jugular  Catheter type: triple lumen  Assessment: placement verified by x-ray,  no pneumothorax on x-ray and successful placement  Complications: none  Complications: No          No flowsheet data found.    Abe Wilder  6/26/2020  "

## 2020-06-27 NOTE — ASSESSMENT & PLAN NOTE
19 year [place] [facility] [place]. New onset decreased EF to 10%, global hypokinesis. Reported persistent fever of 101-102 at OSH on 6/21. Per history no obvious infectious symptoms prior to hospitalization, particularly no URI symptoms. Patient with leukocytosis on [place] [facility], however had been receiving steroids at OSH. Neurology consulted due to AMS and concerning neurologic findings. CT head was negative.      Called [facility] for culture results. Blood cultures on admit at OSH are NGTD for 4 days and U/A was negative at OSH. Repeat cultures drawn after patient became febrile at [facility] have also been no growth and U/A was negative (culture was still sent and NGTD). Patient has been on vancomycin and cefepime. No clear source of infection, perhaps some concern of viral myocarditis given acute drop in EF in a young female with no prior cardiac history.    Unfortunately, patient's condition has continued to deteriorate. She is now on maximum hemodynamic support w/ vasopressors and IABP. Worsening lactic acidosis, febrile to 103 despite broadening of abx coverage yesterday, renal failure w/ plans for CRRT. Made DNR by family overnight. Patient is not stable enough for any additional imaging at this time, and has developed coagulopathy prohibitive of LP.     Plan:  - continue meropenem and vancomycin  - feel viral encephalitis is unlikely, but will add IV acyclovir given continued decline (will require renal dose adjustments - noted plans for 6 hours of SLED today).    Will follow.

## 2020-06-27 NOTE — CHAPLAIN
Spiritual Care Encounter: Consult  Thank you for your referral and/or consult to our services. If you have any questions regarding our meeting with the patient and/or would like for us to engage with them again please feel free to contact us at any time as we are available 24/7.      Purpose:  To meet with the patient/loved one(s)/team as requested by a member of the patient's health care team for or on behalf of the patient/loved one(s)/team, via a consult placed to the spiritual care department.  Visit Type: Initial Visit  Visit Category: Critical Care  Visited With: Patient and family together, Health care provider  Number of Family Visited: 12(immediate family: patient's mother, father, and 12 yo sister)  Length of Visit: 60 Minutes  Continue Visiting: Yes  Referral From: Nurse  Referral To:              Assessment/Intervention:          Patient Spiritual Encounters  Care Provided: Prayer support, Compassionate presence  Patient Coping: Non-verbal   Family Spiritual Encounters  Care Provided: Reflective listening, Life review/ reflection, Compassionate presence, Explored pt/family concerns, Assessed halima resources, Grief care, Decisions near end of life  Family Coping: Anxiety, Open/discussion, Sadness, Living with uncertainty, Fearful, Using halima/ community resources, Family/ friends resources(extensive family and community support)  Comments - Family:  left voicemail for [employer] re: support for patient's 11 year old sister Dimitri. Nurse provided beaded hearts to family (one pinned to patient's gown, one to family to hold) and  continues to be available to family and patient's community for spiritual and emotional support.        Zoila Marcos  Chaplain Resident  On-Call 24/7: #[phone]  Department of Spiritual Care - [place]

## 2020-06-29 ENCOUNTER — DOCUMENTATION ONLY (OUTPATIENT)
Dept: TRANSPLANT | Facility: CLINIC | Age: 20
End: 2020-06-29

## 2020-06-29 LAB
BACTERIA SPEC AEROBE CULT: ABNORMAL
BACTERIA SPEC AEROBE CULT: ABNORMAL
GRAM STN SPEC: ABNORMAL

## 2020-06-29 NOTE — PHYSICIAN QUERY
"PT Name: Yudy Jauregui  MR #: [id number]    Physician Query Form - Heart  Condition Clarification     CDS/: Samanta Diaz               Contact information: [email or web address]    This form is a permanent document in the medical record.     Query Date: June 29, 2020    By submitting this query, we are merely seeking further clarification of documentation. Please utilize your independent clinical judgment when addressing the question(s) below.    The medical record contains the following   Indicators     Supporting Clinical Findings Location in Medical Record   x BNP >4900 Labs 6/23    x EF ·  The estimated ejection fraction is <10%.   Echo 6/24    x Radiology findings Impression:     Endotracheal tube terminates 3 cm above the nahomi.     Cardiomegaly with bilateral airspace opacities and pleural effusions, most likely related to CHF exacerbation. CXR 6/23    x Echo Results · Severe left ventricular enlargement.  · Severely decreased left ventricular systolic function with severe global hypokinesis. The estimated ejection fraction is <10%.  · Mild right ventricular enlargement.  · Moderately reduced right ventricular systolic function.  · Left ventricular diastolic dysfunction.  · Moderate left atrial enlargement.  · Moderate functional mitral regurgitation.  · The estimated PA systolic pressure is at least 17 mmHg.  · Mechanically ventilated; cannot use inferior caval vein diameter to estimate central venous pressure. Echo 6/24     "Ascites" documented     x "SOB" or "DIETZ" documented Acute respiratory failure  - Current on A/C ventilation with RR 14, Vt 300, PEEP 8, FiO2 50%  - Weaning for goal of Vt 270 (6 ml/kg ideal body weight)  Vent Mode: A/C  Oxygen Concentration (%):  [50-97] 50  Resp Rate Total:  [15 br/min-18 br/min] 18 br/min  Vt Set:  [300 mL] 300 mL  PEEP/CPAP:  [8 cmH20] 8 cmH20  Mean Airway Pressure:  [11 cmH20] 11 cmH20 H&P    "Hypoxia" documented      Heart Failure documented      "Edema" " documented     x Diuretics/Meds Current Heart Failure Medications:              -  5 mcg/kg/min              - Epinephrine 0.04 mcg/kg/min               - Reyes 20 ppm              - Nitroprusside 0.3 mcg/kg/min as her BP can tolerate               - Lasix 40 mg/hr                          - Diuril 500 IV intermittently  HTS note 6/27     Treatment:     x Other:  Cardiogenic shock  - Echo prior arrival showed Mod MR, Mild TR, Moderate LV dilation, decreased RV and LV systolic function, small pericardial effusion and bilateral pleural effusion; EF ~20%. Repeat echo with similar results. Plan to repeat Echo here.   - The etiology of her cardiogenic shock could be viral myocarditis associated with multi-organ dysfunction   - Continue current inotrope:  - Milrinone 0.5 mcg/kg/min - will consider switch to  depend on kidney function   - Epinephrine ggt at 0.03mcg/kg/min, goal MAP >65  - Diuretics based on her CVP           - Lasix 80 mg IV x1 followed by 20 mg/hr and repeat HD in 4 hours   - Repeated bedside Echo showed EF ~ 20%, mod MR, mild TR and moderately impaired RV function   - CVP 30, SvO2 65, CI 2.76, CO 4.64 and   H&P   Heart failure (HF) can be acute, chronic or both. It is generally further specificed as systolic, diastolic, or combined. Lastly, it is important to identify an underlying etiology if known or suspected.     Common clues to acute exacerbation:  Rapidly progressive symptoms (w/in 2 weeks of presentation), using IV diuretics to treat, using supplemental O2, pulmonary edema on Xray, MI w/in 4 weeks, and/or BNP >500    Systolic Heart Failure: is defined as chart documentation of a left ventricular ejection fraction (LVEF) less than 40%     Diastolic Heart Failure: is defined as a left ventricular ejection fraction (LVEF) greater than 40%   +      Evidence of diastolic dysfunction on echocardiography OR    Right heart catheterization wedge pressure above 12 mm Hg OR    Left heart  catheterization left ventricular end diastolic pressure 18 mm Hg or above.    References: *American [employer] Association    The clinical guidelines noted below are only system guidelines, and do not replace the providers clinical judgment.     Provider, please specify the diagnosis associated with above clinical findings  [ x  ] Acute Systolic Heart Failure - New diagnosis.  EF < 40%  and acute HF symptoms documented     [   ] Acute Combined Systolic and Diastolic Heart Failure    [   ] Other Type of Heart Failure (please specify type):   [  x ] Other (please specify):cardiogenic shock   [  ] Clinically Undetermined                           Please document in your progress notes daily for the duration of treatment until resolved and include in your discharge summary.

## 2020-07-01 LAB
BACTERIA BLD CULT: NORMAL
BACTERIA BLD CULT: NORMAL

## 2020-07-24 LAB
MISCELLANEOUS TEST NAME: NORMAL
REFERENCE LAB: NORMAL
SPECIMEN TYPE: NORMAL
TEST RESULT: NORMAL